# Patient Record
Sex: MALE | Race: OTHER | HISPANIC OR LATINO | Employment: OTHER | URBAN - METROPOLITAN AREA
[De-identification: names, ages, dates, MRNs, and addresses within clinical notes are randomized per-mention and may not be internally consistent; named-entity substitution may affect disease eponyms.]

---

## 2018-06-08 ENCOUNTER — APPOINTMENT (OUTPATIENT)
Dept: LAB | Facility: HOSPITAL | Age: 68
End: 2018-06-08
Attending: INTERNAL MEDICINE
Payer: MEDICARE

## 2018-06-08 ENCOUNTER — TRANSCRIBE ORDERS (OUTPATIENT)
Dept: ADMINISTRATIVE | Facility: HOSPITAL | Age: 68
End: 2018-06-08

## 2018-06-08 DIAGNOSIS — E03.9 MYXEDEMA HEART DISEASE: ICD-10-CM

## 2018-06-08 DIAGNOSIS — E78.2 MIXED HYPERLIPIDEMIA: ICD-10-CM

## 2018-06-08 DIAGNOSIS — Z09 RADIOTHERAPY FOLLOW-UP EXAMINATION: ICD-10-CM

## 2018-06-08 DIAGNOSIS — I51.9 MYXEDEMA HEART DISEASE: ICD-10-CM

## 2018-06-08 DIAGNOSIS — R53.83 FATIGUE DUE TO DEPRESSION: ICD-10-CM

## 2018-06-08 DIAGNOSIS — R73.9 BLOOD GLUCOSE ELEVATED: Primary | ICD-10-CM

## 2018-06-08 DIAGNOSIS — M25.50 PAIN IN JOINT, MULTIPLE SITES: ICD-10-CM

## 2018-06-08 DIAGNOSIS — I10 ESSENTIAL HYPERTENSION, MALIGNANT: ICD-10-CM

## 2018-06-08 DIAGNOSIS — R94.5 NONSPECIFIC ABNORMAL RESULTS OF LIVER FUNCTION STUDY: ICD-10-CM

## 2018-06-08 DIAGNOSIS — D50.0 IRON DEFICIENCY ANEMIA DUE TO CHRONIC BLOOD LOSS: ICD-10-CM

## 2018-06-08 DIAGNOSIS — F32.A FATIGUE DUE TO DEPRESSION: ICD-10-CM

## 2018-06-08 DIAGNOSIS — N18.1 STAGE 1 CHRONIC KIDNEY DISEASE: ICD-10-CM

## 2018-06-08 DIAGNOSIS — M79.10 MYALGIA: ICD-10-CM

## 2018-06-08 DIAGNOSIS — R10.9 STOMACH ACHE: ICD-10-CM

## 2018-06-08 DIAGNOSIS — R73.9 BLOOD GLUCOSE ELEVATED: ICD-10-CM

## 2018-06-08 LAB
ALBUMIN SERPL BCP-MCNC: 3.8 G/DL (ref 3.5–5)
ALP SERPL-CCNC: 56 U/L (ref 46–116)
ALT SERPL W P-5'-P-CCNC: 45 U/L (ref 12–78)
ANION GAP SERPL CALCULATED.3IONS-SCNC: 11 MMOL/L (ref 4–13)
AST SERPL W P-5'-P-CCNC: 25 U/L (ref 5–45)
BACTERIA UR QL AUTO: ABNORMAL /HPF
BASOPHILS # BLD AUTO: 0.03 THOUSANDS/ΜL (ref 0–0.1)
BASOPHILS NFR BLD AUTO: 0 % (ref 0–1)
BILIRUB SERPL-MCNC: 1.3 MG/DL (ref 0.2–1)
BILIRUB UR QL STRIP: ABNORMAL
BUN SERPL-MCNC: 17 MG/DL (ref 5–25)
CALCIUM SERPL-MCNC: 9.1 MG/DL (ref 8.3–10.1)
CHLORIDE SERPL-SCNC: 105 MMOL/L (ref 100–108)
CHOLEST SERPL-MCNC: 241 MG/DL (ref 50–200)
CLARITY UR: CLEAR
CO2 SERPL-SCNC: 25 MMOL/L (ref 21–32)
COLOR UR: ABNORMAL
CREAT SERPL-MCNC: 0.92 MG/DL (ref 0.6–1.3)
EOSINOPHIL # BLD AUTO: 0.04 THOUSAND/ΜL (ref 0–0.61)
EOSINOPHIL NFR BLD AUTO: 0 % (ref 0–6)
ERYTHROCYTE [DISTWIDTH] IN BLOOD BY AUTOMATED COUNT: 13 % (ref 11.6–15.1)
EST. AVERAGE GLUCOSE BLD GHB EST-MCNC: 126 MG/DL
GFR SERPL CREATININE-BSD FRML MDRD: 85 ML/MIN/1.73SQ M
GLUCOSE P FAST SERPL-MCNC: 103 MG/DL (ref 65–99)
GLUCOSE UR STRIP-MCNC: NEGATIVE MG/DL
HBA1C MFR BLD: 6 % (ref 4.2–6.3)
HCT VFR BLD AUTO: 49.1 % (ref 36.5–49.3)
HDLC SERPL-MCNC: 103 MG/DL (ref 40–60)
HGB BLD-MCNC: 16.4 G/DL (ref 12–17)
HGB UR QL STRIP.AUTO: ABNORMAL
IMM GRANULOCYTES # BLD AUTO: 0.04 THOUSAND/UL (ref 0–0.2)
IMM GRANULOCYTES NFR BLD AUTO: 0 % (ref 0–2)
KETONES UR STRIP-MCNC: NEGATIVE MG/DL
LDLC SERPL CALC-MCNC: 118 MG/DL (ref 0–100)
LEUKOCYTE ESTERASE UR QL STRIP: NEGATIVE
LYMPHOCYTES # BLD AUTO: 1.69 THOUSANDS/ΜL (ref 0.6–4.47)
LYMPHOCYTES NFR BLD AUTO: 17 % (ref 14–44)
MCH RBC QN AUTO: 32.2 PG (ref 26.8–34.3)
MCHC RBC AUTO-ENTMCNC: 33.4 G/DL (ref 31.4–37.4)
MCV RBC AUTO: 96 FL (ref 82–98)
MONOCYTES # BLD AUTO: 0.66 THOUSAND/ΜL (ref 0.17–1.22)
MONOCYTES NFR BLD AUTO: 7 % (ref 4–12)
MUCOUS THREADS UR QL AUTO: ABNORMAL
NEUTROPHILS # BLD AUTO: 7.48 THOUSANDS/ΜL (ref 1.85–7.62)
NEUTS SEG NFR BLD AUTO: 76 % (ref 43–75)
NITRITE UR QL STRIP: NEGATIVE
NON-SQ EPI CELLS URNS QL MICRO: ABNORMAL /HPF
NONHDLC SERPL-MCNC: 138 MG/DL
NRBC BLD AUTO-RTO: 0 /100 WBCS
PH UR STRIP.AUTO: 5.5 [PH] (ref 5–9)
PLATELET # BLD AUTO: 243 THOUSANDS/UL (ref 149–390)
PMV BLD AUTO: 8.8 FL (ref 8.9–12.7)
POTASSIUM SERPL-SCNC: 3.2 MMOL/L (ref 3.5–5.3)
PROT SERPL-MCNC: 7.2 G/DL (ref 6.4–8.2)
PROT UR STRIP-MCNC: ABNORMAL MG/DL
RBC # BLD AUTO: 5.1 MILLION/UL (ref 3.88–5.62)
RBC #/AREA URNS AUTO: ABNORMAL /HPF
SODIUM SERPL-SCNC: 141 MMOL/L (ref 136–145)
SP GR UR STRIP.AUTO: >=1.03 (ref 1–1.03)
TRIGL SERPL-MCNC: 99 MG/DL
TSH SERPL DL<=0.05 MIU/L-ACNC: 0.32 UIU/ML (ref 0.36–3.74)
UROBILINOGEN UR QL STRIP.AUTO: 0.2 E.U./DL
WBC # BLD AUTO: 9.94 THOUSAND/UL (ref 4.31–10.16)
WBC #/AREA URNS AUTO: ABNORMAL /HPF

## 2018-06-08 PROCEDURE — 84443 ASSAY THYROID STIM HORMONE: CPT | Performed by: INTERNAL MEDICINE

## 2018-06-08 PROCEDURE — 81001 URINALYSIS AUTO W/SCOPE: CPT | Performed by: INTERNAL MEDICINE

## 2018-06-08 PROCEDURE — 36415 COLL VENOUS BLD VENIPUNCTURE: CPT | Performed by: INTERNAL MEDICINE

## 2018-06-08 PROCEDURE — 85025 COMPLETE CBC W/AUTO DIFF WBC: CPT

## 2018-06-08 PROCEDURE — 80061 LIPID PANEL: CPT | Performed by: INTERNAL MEDICINE

## 2018-06-08 PROCEDURE — 83036 HEMOGLOBIN GLYCOSYLATED A1C: CPT | Performed by: INTERNAL MEDICINE

## 2018-06-08 PROCEDURE — 80053 COMPREHEN METABOLIC PANEL: CPT | Performed by: INTERNAL MEDICINE

## 2019-01-03 ENCOUNTER — APPOINTMENT (OUTPATIENT)
Dept: LAB | Facility: HOSPITAL | Age: 69
End: 2019-01-03
Attending: INTERNAL MEDICINE
Payer: MEDICARE

## 2019-01-03 ENCOUNTER — TRANSCRIBE ORDERS (OUTPATIENT)
Dept: ADMINISTRATIVE | Facility: HOSPITAL | Age: 69
End: 2019-01-03

## 2019-01-03 DIAGNOSIS — E03.9 MYXEDEMA HEART DISEASE: ICD-10-CM

## 2019-01-03 DIAGNOSIS — R51.9 FACIAL PAIN: ICD-10-CM

## 2019-01-03 DIAGNOSIS — R94.5 NONSPECIFIC ABNORMAL RESULTS OF LIVER FUNCTION STUDY: ICD-10-CM

## 2019-01-03 DIAGNOSIS — R73.9 BLOOD GLUCOSE ELEVATED: ICD-10-CM

## 2019-01-03 DIAGNOSIS — M79.10 MYALGIA: ICD-10-CM

## 2019-01-03 DIAGNOSIS — I10 ESSENTIAL HYPERTENSION, MALIGNANT: ICD-10-CM

## 2019-01-03 DIAGNOSIS — N18.9 CHRONIC KIDNEY DISEASE, UNSPECIFIED CKD STAGE: ICD-10-CM

## 2019-01-03 DIAGNOSIS — E78.2 MIXED HYPERLIPIDEMIA: ICD-10-CM

## 2019-01-03 DIAGNOSIS — I51.9 MYXEDEMA HEART DISEASE: ICD-10-CM

## 2019-01-03 DIAGNOSIS — R73.9 BLOOD GLUCOSE ELEVATED: Primary | ICD-10-CM

## 2019-01-03 LAB
ALBUMIN SERPL BCP-MCNC: 3.6 G/DL (ref 3.5–5)
ALP SERPL-CCNC: 67 U/L (ref 46–116)
ALT SERPL W P-5'-P-CCNC: 31 U/L (ref 12–78)
ANION GAP SERPL CALCULATED.3IONS-SCNC: 8 MMOL/L (ref 4–13)
AST SERPL W P-5'-P-CCNC: 16 U/L (ref 5–45)
BACTERIA UR QL AUTO: ABNORMAL /HPF
BASOPHILS # BLD AUTO: 0.07 THOUSANDS/ΜL (ref 0–0.1)
BASOPHILS NFR BLD AUTO: 1 % (ref 0–1)
BILIRUB SERPL-MCNC: 0.8 MG/DL (ref 0.2–1)
BILIRUB UR QL STRIP: NEGATIVE
BUN SERPL-MCNC: 19 MG/DL (ref 5–25)
CALCIUM SERPL-MCNC: 9.2 MG/DL (ref 8.3–10.1)
CHLORIDE SERPL-SCNC: 106 MMOL/L (ref 100–108)
CHOLEST SERPL-MCNC: 219 MG/DL (ref 50–200)
CLARITY UR: CLEAR
CO2 SERPL-SCNC: 28 MMOL/L (ref 21–32)
COLOR UR: YELLOW
CREAT SERPL-MCNC: 1.08 MG/DL (ref 0.6–1.3)
EOSINOPHIL # BLD AUTO: 0.14 THOUSAND/ΜL (ref 0–0.61)
EOSINOPHIL NFR BLD AUTO: 1 % (ref 0–6)
ERYTHROCYTE [DISTWIDTH] IN BLOOD BY AUTOMATED COUNT: 13.2 % (ref 11.6–15.1)
EST. AVERAGE GLUCOSE BLD GHB EST-MCNC: 126 MG/DL
GFR SERPL CREATININE-BSD FRML MDRD: 70 ML/MIN/1.73SQ M
GLUCOSE P FAST SERPL-MCNC: 99 MG/DL (ref 65–99)
GLUCOSE UR STRIP-MCNC: NEGATIVE MG/DL
HBA1C MFR BLD: 6 % (ref 4.2–6.3)
HCT VFR BLD AUTO: 52.5 % (ref 36.5–49.3)
HDLC SERPL-MCNC: 93 MG/DL (ref 40–60)
HGB BLD-MCNC: 17.1 G/DL (ref 12–17)
HGB UR QL STRIP.AUTO: ABNORMAL
IMM GRANULOCYTES # BLD AUTO: 0.09 THOUSAND/UL (ref 0–0.2)
IMM GRANULOCYTES NFR BLD AUTO: 1 % (ref 0–2)
KETONES UR STRIP-MCNC: NEGATIVE MG/DL
LDLC SERPL CALC-MCNC: 104 MG/DL (ref 0–100)
LEUKOCYTE ESTERASE UR QL STRIP: NEGATIVE
LYMPHOCYTES # BLD AUTO: 2.53 THOUSANDS/ΜL (ref 0.6–4.47)
LYMPHOCYTES NFR BLD AUTO: 22 % (ref 14–44)
MCH RBC QN AUTO: 32.4 PG (ref 26.8–34.3)
MCHC RBC AUTO-ENTMCNC: 32.6 G/DL (ref 31.4–37.4)
MCV RBC AUTO: 99 FL (ref 82–98)
MONOCYTES # BLD AUTO: 0.78 THOUSAND/ΜL (ref 0.17–1.22)
MONOCYTES NFR BLD AUTO: 7 % (ref 4–12)
MUCOUS THREADS UR QL AUTO: ABNORMAL
NEUTROPHILS # BLD AUTO: 7.74 THOUSANDS/ΜL (ref 1.85–7.62)
NEUTS SEG NFR BLD AUTO: 68 % (ref 43–75)
NITRITE UR QL STRIP: NEGATIVE
NON-SQ EPI CELLS URNS QL MICRO: ABNORMAL /HPF
NONHDLC SERPL-MCNC: 126 MG/DL
NRBC BLD AUTO-RTO: 0 /100 WBCS
PH UR STRIP.AUTO: 6 [PH] (ref 5–9)
PLATELET # BLD AUTO: 240 THOUSANDS/UL (ref 149–390)
PMV BLD AUTO: 8.7 FL (ref 8.9–12.7)
POTASSIUM SERPL-SCNC: 3.6 MMOL/L (ref 3.5–5.3)
PROT SERPL-MCNC: 7.1 G/DL (ref 6.4–8.2)
PROT UR STRIP-MCNC: ABNORMAL MG/DL
RBC # BLD AUTO: 5.28 MILLION/UL (ref 3.88–5.62)
RBC #/AREA URNS AUTO: ABNORMAL /HPF
SODIUM SERPL-SCNC: 142 MMOL/L (ref 136–145)
SP GR UR STRIP.AUTO: >=1.03 (ref 1–1.03)
TRIGL SERPL-MCNC: 111 MG/DL
TSH SERPL DL<=0.05 MIU/L-ACNC: 0.29 UIU/ML (ref 0.36–3.74)
UROBILINOGEN UR QL STRIP.AUTO: 0.2 E.U./DL
WBC # BLD AUTO: 11.35 THOUSAND/UL (ref 4.31–10.16)
WBC #/AREA URNS AUTO: ABNORMAL /HPF

## 2019-01-03 PROCEDURE — 83036 HEMOGLOBIN GLYCOSYLATED A1C: CPT

## 2019-01-03 PROCEDURE — 36415 COLL VENOUS BLD VENIPUNCTURE: CPT

## 2019-01-03 PROCEDURE — 81001 URINALYSIS AUTO W/SCOPE: CPT | Performed by: INTERNAL MEDICINE

## 2019-01-03 PROCEDURE — 85025 COMPLETE CBC W/AUTO DIFF WBC: CPT

## 2019-01-03 PROCEDURE — 80053 COMPREHEN METABOLIC PANEL: CPT

## 2019-01-03 PROCEDURE — 80061 LIPID PANEL: CPT

## 2019-01-03 PROCEDURE — 84443 ASSAY THYROID STIM HORMONE: CPT

## 2019-05-22 ENCOUNTER — TRANSCRIBE ORDERS (OUTPATIENT)
Dept: ADMINISTRATIVE | Facility: HOSPITAL | Age: 69
End: 2019-05-22

## 2019-05-22 ENCOUNTER — APPOINTMENT (OUTPATIENT)
Dept: LAB | Facility: HOSPITAL | Age: 69
End: 2019-05-22
Attending: INTERNAL MEDICINE
Payer: MEDICARE

## 2019-05-22 DIAGNOSIS — N18.9 CHRONIC KIDNEY DISEASE, UNSPECIFIED CKD STAGE: ICD-10-CM

## 2019-05-22 DIAGNOSIS — I51.9 MYXEDEMA HEART DISEASE: ICD-10-CM

## 2019-05-22 DIAGNOSIS — T73.3XXS FATIGUE DUE TO EXCESSIVE EXERTION, SEQUELA: ICD-10-CM

## 2019-05-22 DIAGNOSIS — Z79.01 LONG TERM (CURRENT) USE OF ANTICOAGULANTS: ICD-10-CM

## 2019-05-22 DIAGNOSIS — I10 ESSENTIAL HYPERTENSION, MALIGNANT: ICD-10-CM

## 2019-05-22 DIAGNOSIS — R94.5 NONSPECIFIC ABNORMAL RESULTS OF LIVER FUNCTION STUDY: ICD-10-CM

## 2019-05-22 DIAGNOSIS — R50.9 HYPERTHERMIA-INDUCED DEFECT: ICD-10-CM

## 2019-05-22 DIAGNOSIS — M79.10 MYALGIA: ICD-10-CM

## 2019-05-22 DIAGNOSIS — E78.2 MIXED HYPERLIPIDEMIA: ICD-10-CM

## 2019-05-22 DIAGNOSIS — D51.8 OTHER VITAMIN B12 DEFICIENCY ANEMIA: ICD-10-CM

## 2019-05-22 DIAGNOSIS — E03.9 MYXEDEMA HEART DISEASE: ICD-10-CM

## 2019-05-22 DIAGNOSIS — R10.9 STOMACH ACHE: ICD-10-CM

## 2019-05-22 DIAGNOSIS — R73.9 BLOOD GLUCOSE ELEVATED: ICD-10-CM

## 2019-05-22 DIAGNOSIS — R73.9 BLOOD GLUCOSE ELEVATED: Primary | ICD-10-CM

## 2019-05-22 LAB
25(OH)D3 SERPL-MCNC: 11.9 NG/ML (ref 30–100)
ALBUMIN SERPL BCP-MCNC: 3.3 G/DL (ref 3.5–5)
ALP SERPL-CCNC: 61 U/L (ref 46–116)
ALT SERPL W P-5'-P-CCNC: 32 U/L (ref 12–78)
ANION GAP SERPL CALCULATED.3IONS-SCNC: 8 MMOL/L (ref 4–13)
AST SERPL W P-5'-P-CCNC: 17 U/L (ref 5–45)
BACTERIA UR QL AUTO: ABNORMAL /HPF
BASOPHILS # BLD AUTO: 0.02 THOUSANDS/ΜL (ref 0–0.1)
BASOPHILS NFR BLD AUTO: 0 % (ref 0–1)
BILIRUB SERPL-MCNC: 0.8 MG/DL (ref 0.2–1)
BILIRUB UR QL STRIP: NEGATIVE
BUN SERPL-MCNC: 21 MG/DL (ref 5–25)
CALCIUM SERPL-MCNC: 8.6 MG/DL (ref 8.3–10.1)
CHLORIDE SERPL-SCNC: 105 MMOL/L (ref 100–108)
CHOLEST SERPL-MCNC: 214 MG/DL (ref 50–200)
CLARITY UR: CLEAR
CO2 SERPL-SCNC: 27 MMOL/L (ref 21–32)
COLOR UR: YELLOW
CREAT SERPL-MCNC: 0.99 MG/DL (ref 0.6–1.3)
EOSINOPHIL # BLD AUTO: 0.1 THOUSAND/ΜL (ref 0–0.61)
EOSINOPHIL NFR BLD AUTO: 1 % (ref 0–6)
ERYTHROCYTE [DISTWIDTH] IN BLOOD BY AUTOMATED COUNT: 13.2 % (ref 11.6–15.1)
EST. AVERAGE GLUCOSE BLD GHB EST-MCNC: 123 MG/DL
GFR SERPL CREATININE-BSD FRML MDRD: 78 ML/MIN/1.73SQ M
GLUCOSE P FAST SERPL-MCNC: 96 MG/DL (ref 65–99)
GLUCOSE UR STRIP-MCNC: NEGATIVE MG/DL
HBA1C MFR BLD: 5.9 % (ref 4.2–6.3)
HCT VFR BLD AUTO: 50.2 % (ref 36.5–49.3)
HDLC SERPL-MCNC: 81 MG/DL (ref 40–60)
HGB BLD-MCNC: 16.4 G/DL (ref 12–17)
HGB UR QL STRIP.AUTO: ABNORMAL
IMM GRANULOCYTES # BLD AUTO: 0.07 THOUSAND/UL (ref 0–0.2)
IMM GRANULOCYTES NFR BLD AUTO: 1 % (ref 0–2)
KETONES UR STRIP-MCNC: NEGATIVE MG/DL
LDLC SERPL CALC-MCNC: 109 MG/DL (ref 0–100)
LEUKOCYTE ESTERASE UR QL STRIP: NEGATIVE
LYMPHOCYTES # BLD AUTO: 2.57 THOUSANDS/ΜL (ref 0.6–4.47)
LYMPHOCYTES NFR BLD AUTO: 23 % (ref 14–44)
MCH RBC QN AUTO: 32.1 PG (ref 26.8–34.3)
MCHC RBC AUTO-ENTMCNC: 32.7 G/DL (ref 31.4–37.4)
MCV RBC AUTO: 98 FL (ref 82–98)
MONOCYTES # BLD AUTO: 0.81 THOUSAND/ΜL (ref 0.17–1.22)
MONOCYTES NFR BLD AUTO: 7 % (ref 4–12)
MUCOUS THREADS UR QL AUTO: ABNORMAL
NEUTROPHILS # BLD AUTO: 7.74 THOUSANDS/ΜL (ref 1.85–7.62)
NEUTS SEG NFR BLD AUTO: 68 % (ref 43–75)
NITRITE UR QL STRIP: NEGATIVE
NON-SQ EPI CELLS URNS QL MICRO: ABNORMAL /HPF
NONHDLC SERPL-MCNC: 133 MG/DL
NRBC BLD AUTO-RTO: 0 /100 WBCS
PH UR STRIP.AUTO: 6 [PH]
PLATELET # BLD AUTO: 234 THOUSANDS/UL (ref 149–390)
PMV BLD AUTO: 9 FL (ref 8.9–12.7)
POTASSIUM SERPL-SCNC: 3.3 MMOL/L (ref 3.5–5.3)
PROT SERPL-MCNC: 7 G/DL (ref 6.4–8.2)
PROT UR STRIP-MCNC: ABNORMAL MG/DL
PSA SERPL-MCNC: 1.2 NG/ML (ref 0–4)
RBC # BLD AUTO: 5.11 MILLION/UL (ref 3.88–5.62)
RBC #/AREA URNS AUTO: ABNORMAL /HPF
SODIUM SERPL-SCNC: 140 MMOL/L (ref 136–145)
SP GR UR STRIP.AUTO: 1.02 (ref 1–1.03)
TRIGL SERPL-MCNC: 122 MG/DL
TSH SERPL DL<=0.05 MIU/L-ACNC: 0.36 UIU/ML (ref 0.36–3.74)
UROBILINOGEN UR QL STRIP.AUTO: 0.2 E.U./DL
WBC # BLD AUTO: 11.31 THOUSAND/UL (ref 4.31–10.16)
WBC #/AREA URNS AUTO: ABNORMAL /HPF

## 2019-05-22 PROCEDURE — 83036 HEMOGLOBIN GLYCOSYLATED A1C: CPT | Performed by: INTERNAL MEDICINE

## 2019-05-22 PROCEDURE — 85025 COMPLETE CBC W/AUTO DIFF WBC: CPT | Performed by: INTERNAL MEDICINE

## 2019-05-22 PROCEDURE — 82306 VITAMIN D 25 HYDROXY: CPT

## 2019-05-22 PROCEDURE — 80053 COMPREHEN METABOLIC PANEL: CPT | Performed by: INTERNAL MEDICINE

## 2019-05-22 PROCEDURE — 84443 ASSAY THYROID STIM HORMONE: CPT

## 2019-05-22 PROCEDURE — G0103 PSA SCREENING: HCPCS

## 2019-05-22 PROCEDURE — 81001 URINALYSIS AUTO W/SCOPE: CPT | Performed by: INTERNAL MEDICINE

## 2019-05-22 PROCEDURE — 36415 COLL VENOUS BLD VENIPUNCTURE: CPT | Performed by: INTERNAL MEDICINE

## 2019-05-22 PROCEDURE — 80061 LIPID PANEL: CPT

## 2019-12-01 ENCOUNTER — OFFICE VISIT (OUTPATIENT)
Dept: URGENT CARE | Facility: CLINIC | Age: 69
End: 2019-12-01
Payer: MEDICARE

## 2019-12-01 VITALS
DIASTOLIC BLOOD PRESSURE: 84 MMHG | BODY MASS INDEX: 29.88 KG/M2 | TEMPERATURE: 98.6 F | RESPIRATION RATE: 18 BRPM | HEART RATE: 88 BPM | HEIGHT: 64 IN | SYSTOLIC BLOOD PRESSURE: 136 MMHG | OXYGEN SATURATION: 98 % | WEIGHT: 175 LBS

## 2019-12-01 DIAGNOSIS — J06.9 UPPER RESPIRATORY TRACT INFECTION, UNSPECIFIED TYPE: Primary | ICD-10-CM

## 2019-12-01 PROCEDURE — 99213 OFFICE O/P EST LOW 20 MIN: CPT | Performed by: PHYSICIAN ASSISTANT

## 2019-12-01 RX ORDER — LORATADINE 10 MG/1
10 TABLET ORAL DAILY
Refills: 0 | Status: ON HOLD | COMMUNITY
Start: 2019-11-08 | End: 2021-07-17 | Stop reason: ALTCHOICE

## 2019-12-01 RX ORDER — AZITHROMYCIN 250 MG/1
TABLET, FILM COATED ORAL
Qty: 6 TABLET | Refills: 0 | Status: SHIPPED | OUTPATIENT
Start: 2019-12-01 | End: 2019-12-05

## 2019-12-01 RX ORDER — ERGOCALCIFEROL 1.25 MG/1
50000 CAPSULE ORAL WEEKLY
Refills: 0 | Status: ON HOLD | COMMUNITY
Start: 2019-10-26 | End: 2021-07-17 | Stop reason: ALTCHOICE

## 2019-12-01 NOTE — PROGRESS NOTES
[facility]    NAME: Marla Evans is a 71 y o  male  : 1950    MRN: [id number]  DATE: 2019  TIME: 10:35 PM    Assessment and Plan   Upper respiratory tract infection, unspecified type [J06 9]  1  Upper respiratory tract infection, unspecified type  azithromycin (ZITHROMAX) 250 mg tablet       Patient Instructions   rx z-corine sent via EMR  Recommend mucinex DM for cough  Also humidifier and breathing steam  Rest, fluids and supportive care  Follow up with PCP in 3-5 days  Proceed to  ER if symptoms worsen  Chief Complaint     Chief Complaint   Patient presents with    Cough     Been sick for the last 8 days- his cough has been keeping him awake at night    Headache    Sore Throat         History of Present Illness       Dustin Yost is a 80-year-old male who presents to clinic complaining of cough x8 days  He states that the cough is productive with yellow white sputum  He states the cough is worse at night when he lays down  He is also having a mild sore throat  He denies any fever, chills, sinus pain or pressure, shortness of breath, chest pain, or ear pain  He is having headaches as well  And notes that mom multiple grand kids of his or sick and he was around them during the holiday  Review of Systems   Review of Systems   Constitutional: Positive for fatigue  Negative for chills and fever  HENT: Positive for congestion and sore throat  Negative for ear pain, sinus pressure and sinus pain  Respiratory: Positive for cough  Negative for shortness of breath  Musculoskeletal: Negative for myalgias  Neurological: Positive for headaches       Current Medications       Current Outpatient Medications:     azithromycin (ZITHROMAX) 250 mg tablet, Take 2 tablets today then 1 tablet daily x 4 days, Disp: 6 tablet, Rfl: 0    ergocalciferol (VITAMIN D2) 50,000 units, Take 50,000 Units by mouth once a week, Disp: , Rfl: 0    lisinopril (ZESTRIL) 20 mg tablet, Take 20 mg by mouth daily in the early morning , Disp: , Rfl:     loratadine (CLARITIN) 10 mg tablet, Take 10 mg by mouth daily, Disp: , Rfl: 0    tamsulosin (FLOMAX) 0 4 mg, Take 0 4 mg by mouth daily at bedtime  , Disp: , Rfl:     Current Allergies     Allergies as of 12/01/2019    (No Known Allergies)            The following portions of the patient's history were reviewed and updated as appropriate: allergies, current medications, past family history, past medical history, past social history, past surgical history and problem list      Past Medical History:   Diagnosis Date    BPH (benign prostatic hyperplasia)     Hypertension     Kidney calculi     left       Past Surgical History:   Procedure Laterality Date    EXTRACORPOREAL SHOCK WAVE LITHOTRIPSY      FRACTURE SURGERY Right     ankle with hardware    TRANSURETHRAL RESECTION OF PROSTATE         History reviewed  No pertinent family history  Medications have been verified  Objective   /84 (BP Location: Right arm, Patient Position: Sitting, Cuff Size: Standard)   Pulse 88   Temp 98 6 °F (37 °C) (Temporal)   Resp 18   Ht 5' 4" (1 626 m)   Wt 79 4 kg (175 lb)   SpO2 98%   BMI 30 04 kg/m²        Physical Exam     Physical Exam   Constitutional: He is oriented to person, place, and time  He appears well-developed and well-nourished  No distress  HENT:   Right Ear: Hearing, tympanic membrane, external ear and ear canal normal    Left Ear: Hearing, tympanic membrane, external ear and ear canal normal    Nose: Mucosal edema and rhinorrhea present  Right sinus exhibits no maxillary sinus tenderness and no frontal sinus tenderness  Left sinus exhibits no maxillary sinus tenderness and no frontal sinus tenderness  Mouth/Throat: Uvula is midline and mucous membranes are normal  Posterior oropharyngeal erythema present  Tonsils are 0 on the right  Tonsils are 0 on the left  No tonsillar exudate  Cardiovascular: Normal rate and regular rhythm  Pulmonary/Chest: Effort normal and breath sounds normal  No respiratory distress  He has no wheezes  He has no rhonchi  Lymphadenopathy:     He has no cervical adenopathy  Neurological: He is alert and oriented to person, place, and time  Psychiatric: He has a normal mood and affect  Nursing note and vitals reviewed

## 2019-12-01 NOTE — PATIENT INSTRUCTIONS
rx z-corine sent via EMR  Recommend mucinex DM for cough  Also humidifier and breathing steam  Rest, fluids and supportive care  Follow up with PCP in 3-5 days  Proceed to  ER if symptoms worsen

## 2020-09-11 ENCOUNTER — APPOINTMENT (OUTPATIENT)
Dept: LAB | Facility: HOSPITAL | Age: 70
End: 2020-09-11
Attending: INTERNAL MEDICINE
Payer: MEDICARE

## 2020-09-11 ENCOUNTER — TRANSCRIBE ORDERS (OUTPATIENT)
Dept: ADMINISTRATIVE | Facility: HOSPITAL | Age: 70
End: 2020-09-11

## 2020-09-11 DIAGNOSIS — R51.9 NONINTRACTABLE HEADACHE, UNSPECIFIED CHRONICITY PATTERN, UNSPECIFIED HEADACHE TYPE: ICD-10-CM

## 2020-09-11 DIAGNOSIS — M25.50 ARTHRALGIA, UNSPECIFIED JOINT: ICD-10-CM

## 2020-09-11 DIAGNOSIS — N13.8 ENLARGED PROSTATE WITH URINARY OBSTRUCTION: ICD-10-CM

## 2020-09-11 DIAGNOSIS — E78.2 MIXED HYPERLIPIDEMIA: ICD-10-CM

## 2020-09-11 DIAGNOSIS — E03.9 HYPOTHYROIDISM, ADULT: ICD-10-CM

## 2020-09-11 DIAGNOSIS — N40.1 ENLARGED PROSTATE WITH URINARY OBSTRUCTION: ICD-10-CM

## 2020-09-11 DIAGNOSIS — Z79.899 HIGH RISK MEDICATION USE: ICD-10-CM

## 2020-09-11 DIAGNOSIS — N18.9 CHRONIC KIDNEY DISEASE, UNSPECIFIED CKD STAGE: ICD-10-CM

## 2020-09-11 DIAGNOSIS — R94.5 ABNORMAL RESULTS OF LIVER FUNCTION STUDIES: ICD-10-CM

## 2020-09-11 DIAGNOSIS — D64.9 ANEMIA, UNSPECIFIED TYPE: ICD-10-CM

## 2020-09-11 DIAGNOSIS — R53.83 TIREDNESS: ICD-10-CM

## 2020-09-11 DIAGNOSIS — R73.9 HYPERGLYCEMIA: ICD-10-CM

## 2020-09-11 DIAGNOSIS — R73.9 HYPERGLYCEMIA: Primary | ICD-10-CM

## 2020-09-11 DIAGNOSIS — I10 ESSENTIAL HYPERTENSION, MALIGNANT: ICD-10-CM

## 2020-09-11 DIAGNOSIS — E05.20 TOXIC MULTINODULAR GOITER: ICD-10-CM

## 2020-09-11 DIAGNOSIS — M79.10 MYALGIA: ICD-10-CM

## 2020-09-11 DIAGNOSIS — R10.9 ABDOMINAL PAIN, UNSPECIFIED ABDOMINAL LOCATION: ICD-10-CM

## 2020-09-11 LAB
ALBUMIN SERPL BCP-MCNC: 3.5 G/DL (ref 3.5–5)
ALP SERPL-CCNC: 64 U/L (ref 46–116)
ALT SERPL W P-5'-P-CCNC: 33 U/L (ref 12–78)
ANION GAP SERPL CALCULATED.3IONS-SCNC: 8 MMOL/L (ref 4–13)
AST SERPL W P-5'-P-CCNC: 20 U/L (ref 5–45)
BACTERIA UR QL AUTO: ABNORMAL /HPF
BASOPHILS # BLD AUTO: 0.04 THOUSANDS/ΜL (ref 0–0.1)
BASOPHILS NFR BLD AUTO: 0 % (ref 0–1)
BILIRUB SERPL-MCNC: 1.2 MG/DL (ref 0.2–1)
BILIRUB UR QL STRIP: NEGATIVE
BUN SERPL-MCNC: 22 MG/DL (ref 5–25)
CALCIUM SERPL-MCNC: 8.7 MG/DL (ref 8.3–10.1)
CHLORIDE SERPL-SCNC: 106 MMOL/L (ref 100–108)
CHOLEST SERPL-MCNC: 234 MG/DL (ref 50–200)
CLARITY UR: CLEAR
CO2 SERPL-SCNC: 28 MMOL/L (ref 21–32)
COLOR UR: ABNORMAL
CREAT SERPL-MCNC: 1.05 MG/DL (ref 0.6–1.3)
EOSINOPHIL # BLD AUTO: 0.07 THOUSAND/ΜL (ref 0–0.61)
EOSINOPHIL NFR BLD AUTO: 1 % (ref 0–6)
ERYTHROCYTE [DISTWIDTH] IN BLOOD BY AUTOMATED COUNT: 13.5 % (ref 11.6–15.1)
EST. AVERAGE GLUCOSE BLD GHB EST-MCNC: 117 MG/DL
GFR SERPL CREATININE-BSD FRML MDRD: 72 ML/MIN/1.73SQ M
GLUCOSE P FAST SERPL-MCNC: 95 MG/DL (ref 65–99)
GLUCOSE UR STRIP-MCNC: NEGATIVE MG/DL
HBA1C MFR BLD: 5.7 %
HCT VFR BLD AUTO: 49.6 % (ref 36.5–49.3)
HDLC SERPL-MCNC: 91 MG/DL
HGB BLD-MCNC: 16.4 G/DL (ref 12–17)
HGB UR QL STRIP.AUTO: ABNORMAL
IMM GRANULOCYTES # BLD AUTO: 0.06 THOUSAND/UL (ref 0–0.2)
IMM GRANULOCYTES NFR BLD AUTO: 1 % (ref 0–2)
KETONES UR STRIP-MCNC: NEGATIVE MG/DL
LDLC SERPL CALC-MCNC: 121 MG/DL (ref 0–100)
LEUKOCYTE ESTERASE UR QL STRIP: NEGATIVE
LYMPHOCYTES # BLD AUTO: 1.94 THOUSANDS/ΜL (ref 0.6–4.47)
LYMPHOCYTES NFR BLD AUTO: 20 % (ref 14–44)
MCH RBC QN AUTO: 33 PG (ref 26.8–34.3)
MCHC RBC AUTO-ENTMCNC: 33.1 G/DL (ref 31.4–37.4)
MCV RBC AUTO: 100 FL (ref 82–98)
MONOCYTES # BLD AUTO: 0.68 THOUSAND/ΜL (ref 0.17–1.22)
MONOCYTES NFR BLD AUTO: 7 % (ref 4–12)
MUCOUS THREADS UR QL AUTO: ABNORMAL
NEUTROPHILS # BLD AUTO: 7.02 THOUSANDS/ΜL (ref 1.85–7.62)
NEUTS SEG NFR BLD AUTO: 71 % (ref 43–75)
NITRITE UR QL STRIP: NEGATIVE
NON-SQ EPI CELLS URNS QL MICRO: ABNORMAL /HPF
NONHDLC SERPL-MCNC: 143 MG/DL
NRBC BLD AUTO-RTO: 0 /100 WBCS
PH UR STRIP.AUTO: 6 [PH]
PLATELET # BLD AUTO: 222 THOUSANDS/UL (ref 149–390)
PMV BLD AUTO: 9.1 FL (ref 8.9–12.7)
POTASSIUM SERPL-SCNC: 3.1 MMOL/L (ref 3.5–5.3)
PROT SERPL-MCNC: 6.9 G/DL (ref 6.4–8.2)
PROT UR STRIP-MCNC: ABNORMAL MG/DL
PSA SERPL-MCNC: 1.4 NG/ML (ref 0–4)
RBC # BLD AUTO: 4.97 MILLION/UL (ref 3.88–5.62)
RBC #/AREA URNS AUTO: ABNORMAL /HPF
SODIUM SERPL-SCNC: 142 MMOL/L (ref 136–145)
SP GR UR STRIP.AUTO: 1.02 (ref 1–1.03)
T3 SERPL-MCNC: 1 NG/ML (ref 0.6–1.8)
T4 FREE SERPL-MCNC: 1.03 NG/DL (ref 0.76–1.46)
TRIGL SERPL-MCNC: 112 MG/DL
TSH SERPL DL<=0.05 MIU/L-ACNC: 0.32 UIU/ML (ref 0.36–3.74)
UROBILINOGEN UR QL STRIP.AUTO: 0.2 E.U./DL
WBC # BLD AUTO: 9.81 THOUSAND/UL (ref 4.31–10.16)
WBC #/AREA URNS AUTO: ABNORMAL /HPF

## 2020-09-11 PROCEDURE — 85025 COMPLETE CBC W/AUTO DIFF WBC: CPT

## 2020-09-11 PROCEDURE — 36415 COLL VENOUS BLD VENIPUNCTURE: CPT

## 2020-09-11 PROCEDURE — 84439 ASSAY OF FREE THYROXINE: CPT

## 2020-09-11 PROCEDURE — 81001 URINALYSIS AUTO W/SCOPE: CPT | Performed by: INTERNAL MEDICINE

## 2020-09-11 PROCEDURE — G0103 PSA SCREENING: HCPCS

## 2020-09-11 PROCEDURE — 84480 ASSAY TRIIODOTHYRONINE (T3): CPT

## 2020-09-11 PROCEDURE — 84443 ASSAY THYROID STIM HORMONE: CPT

## 2020-09-11 PROCEDURE — 80061 LIPID PANEL: CPT

## 2020-09-11 PROCEDURE — 83036 HEMOGLOBIN GLYCOSYLATED A1C: CPT

## 2020-09-11 PROCEDURE — 80053 COMPREHEN METABOLIC PANEL: CPT

## 2021-02-24 ENCOUNTER — LAB (OUTPATIENT)
Dept: LAB | Facility: HOSPITAL | Age: 71
End: 2021-02-24
Attending: INTERNAL MEDICINE
Payer: MEDICARE

## 2021-02-24 ENCOUNTER — TRANSCRIBE ORDERS (OUTPATIENT)
Dept: ADMINISTRATIVE | Facility: HOSPITAL | Age: 71
End: 2021-02-24

## 2021-02-24 DIAGNOSIS — D64.9 ANEMIA, UNSPECIFIED TYPE: ICD-10-CM

## 2021-02-24 DIAGNOSIS — R53.83 FATIGUE, UNSPECIFIED TYPE: ICD-10-CM

## 2021-02-24 DIAGNOSIS — R94.5 NONSPECIFIC ABNORMAL RESULTS OF LIVER FUNCTION STUDY: ICD-10-CM

## 2021-02-24 DIAGNOSIS — E78.2 MIXED HYPERLIPIDEMIA: ICD-10-CM

## 2021-02-24 DIAGNOSIS — I10 ESSENTIAL HYPERTENSION, MALIGNANT: ICD-10-CM

## 2021-02-24 DIAGNOSIS — R73.9 BLOOD GLUCOSE ELEVATED: Primary | ICD-10-CM

## 2021-02-24 DIAGNOSIS — E03.9 HYPOTHYROIDISM, UNSPECIFIED TYPE: ICD-10-CM

## 2021-02-24 DIAGNOSIS — E55.9 VITAMIN D DEFICIENCY: ICD-10-CM

## 2021-02-24 DIAGNOSIS — R73.9 BLOOD GLUCOSE ELEVATED: ICD-10-CM

## 2021-02-24 LAB
25(OH)D3 SERPL-MCNC: 15.1 NG/ML (ref 30–100)
ALBUMIN SERPL BCP-MCNC: 3.6 G/DL (ref 3.5–5)
ALP SERPL-CCNC: 66 U/L (ref 46–116)
ALT SERPL W P-5'-P-CCNC: 30 U/L (ref 12–78)
ANION GAP SERPL CALCULATED.3IONS-SCNC: 8 MMOL/L (ref 4–13)
AST SERPL W P-5'-P-CCNC: 15 U/L (ref 5–45)
BACTERIA UR QL AUTO: ABNORMAL /HPF
BASOPHILS # BLD AUTO: 0.05 THOUSANDS/ΜL (ref 0–0.1)
BASOPHILS NFR BLD AUTO: 1 % (ref 0–1)
BILIRUB SERPL-MCNC: 0.8 MG/DL (ref 0.2–1)
BILIRUB UR QL STRIP: NEGATIVE
BUN SERPL-MCNC: 22 MG/DL (ref 5–25)
CALCIUM SERPL-MCNC: 9.2 MG/DL (ref 8.3–10.1)
CHLORIDE SERPL-SCNC: 105 MMOL/L (ref 100–108)
CHOLEST SERPL-MCNC: 224 MG/DL (ref 50–200)
CLARITY UR: CLEAR
CO2 SERPL-SCNC: 28 MMOL/L (ref 21–32)
COLOR UR: YELLOW
CREAT SERPL-MCNC: 1.02 MG/DL (ref 0.6–1.3)
EOSINOPHIL # BLD AUTO: 0.07 THOUSAND/ΜL (ref 0–0.61)
EOSINOPHIL NFR BLD AUTO: 1 % (ref 0–6)
ERYTHROCYTE [DISTWIDTH] IN BLOOD BY AUTOMATED COUNT: 13.5 % (ref 11.6–15.1)
EST. AVERAGE GLUCOSE BLD GHB EST-MCNC: 117 MG/DL
GFR SERPL CREATININE-BSD FRML MDRD: 74 ML/MIN/1.73SQ M
GLUCOSE P FAST SERPL-MCNC: 101 MG/DL (ref 65–99)
GLUCOSE UR STRIP-MCNC: NEGATIVE MG/DL
HBA1C MFR BLD: 5.7 %
HCT VFR BLD AUTO: 51.2 % (ref 36.5–49.3)
HDLC SERPL-MCNC: 90 MG/DL
HGB BLD-MCNC: 16.2 G/DL (ref 12–17)
HGB UR QL STRIP.AUTO: ABNORMAL
IMM GRANULOCYTES # BLD AUTO: 0.06 THOUSAND/UL (ref 0–0.2)
IMM GRANULOCYTES NFR BLD AUTO: 1 % (ref 0–2)
KETONES UR STRIP-MCNC: NEGATIVE MG/DL
LDLC SERPL CALC-MCNC: 110 MG/DL (ref 0–100)
LEUKOCYTE ESTERASE UR QL STRIP: NEGATIVE
LYMPHOCYTES # BLD AUTO: 1.68 THOUSANDS/ΜL (ref 0.6–4.47)
LYMPHOCYTES NFR BLD AUTO: 21 % (ref 14–44)
MCH RBC QN AUTO: 31.3 PG (ref 26.8–34.3)
MCHC RBC AUTO-ENTMCNC: 31.6 G/DL (ref 31.4–37.4)
MCV RBC AUTO: 99 FL (ref 82–98)
MONOCYTES # BLD AUTO: 0.6 THOUSAND/ΜL (ref 0.17–1.22)
MONOCYTES NFR BLD AUTO: 7 % (ref 4–12)
MUCOUS THREADS UR QL AUTO: ABNORMAL
NEUTROPHILS # BLD AUTO: 5.74 THOUSANDS/ΜL (ref 1.85–7.62)
NEUTS SEG NFR BLD AUTO: 69 % (ref 43–75)
NITRITE UR QL STRIP: NEGATIVE
NON-SQ EPI CELLS URNS QL MICRO: ABNORMAL /HPF
NONHDLC SERPL-MCNC: 134 MG/DL
NRBC BLD AUTO-RTO: 0 /100 WBCS
PH UR STRIP.AUTO: 7 [PH]
PLATELET # BLD AUTO: 226 THOUSANDS/UL (ref 149–390)
PMV BLD AUTO: 9.1 FL (ref 8.9–12.7)
POTASSIUM SERPL-SCNC: 3.7 MMOL/L (ref 3.5–5.3)
PROT SERPL-MCNC: 7 G/DL (ref 6.4–8.2)
PROT UR STRIP-MCNC: NEGATIVE MG/DL
RBC # BLD AUTO: 5.18 MILLION/UL (ref 3.88–5.62)
RBC #/AREA URNS AUTO: ABNORMAL /HPF
SODIUM SERPL-SCNC: 141 MMOL/L (ref 136–145)
SP GR UR STRIP.AUTO: 1.02 (ref 1–1.03)
TRIGL SERPL-MCNC: 118 MG/DL
TSH SERPL DL<=0.05 MIU/L-ACNC: 0.3 UIU/ML (ref 0.36–3.74)
UROBILINOGEN UR QL STRIP.AUTO: 0.2 E.U./DL
WBC # BLD AUTO: 8.2 THOUSAND/UL (ref 4.31–10.16)
WBC #/AREA URNS AUTO: ABNORMAL /HPF

## 2021-02-24 PROCEDURE — 83036 HEMOGLOBIN GLYCOSYLATED A1C: CPT | Performed by: INTERNAL MEDICINE

## 2021-02-24 PROCEDURE — 84443 ASSAY THYROID STIM HORMONE: CPT

## 2021-02-24 PROCEDURE — 36415 COLL VENOUS BLD VENIPUNCTURE: CPT | Performed by: INTERNAL MEDICINE

## 2021-02-24 PROCEDURE — 81001 URINALYSIS AUTO W/SCOPE: CPT | Performed by: INTERNAL MEDICINE

## 2021-02-24 PROCEDURE — 80053 COMPREHEN METABOLIC PANEL: CPT | Performed by: INTERNAL MEDICINE

## 2021-02-24 PROCEDURE — 82306 VITAMIN D 25 HYDROXY: CPT

## 2021-02-24 PROCEDURE — 80061 LIPID PANEL: CPT | Performed by: INTERNAL MEDICINE

## 2021-02-24 PROCEDURE — 85025 COMPLETE CBC W/AUTO DIFF WBC: CPT | Performed by: INTERNAL MEDICINE

## 2021-03-01 DIAGNOSIS — Z23 ENCOUNTER FOR IMMUNIZATION: ICD-10-CM

## 2021-03-04 ENCOUNTER — IMMUNIZATIONS (OUTPATIENT)
Dept: FAMILY MEDICINE CLINIC | Facility: HOSPITAL | Age: 71
End: 2021-03-04

## 2021-03-04 DIAGNOSIS — Z23 ENCOUNTER FOR IMMUNIZATION: Primary | ICD-10-CM

## 2021-03-04 PROCEDURE — 91300 SARS-COV-2 / COVID-19 MRNA VACCINE (PFIZER-BIONTECH) 30 MCG: CPT

## 2021-03-04 PROCEDURE — 0001A SARS-COV-2 / COVID-19 MRNA VACCINE (PFIZER-BIONTECH) 30 MCG: CPT

## 2021-03-25 ENCOUNTER — IMMUNIZATIONS (OUTPATIENT)
Dept: FAMILY MEDICINE CLINIC | Facility: HOSPITAL | Age: 71
End: 2021-03-25

## 2021-03-25 DIAGNOSIS — Z23 ENCOUNTER FOR IMMUNIZATION: Primary | ICD-10-CM

## 2021-03-25 PROCEDURE — 0002A SARS-COV-2 / COVID-19 MRNA VACCINE (PFIZER-BIONTECH) 30 MCG: CPT

## 2021-03-25 PROCEDURE — 91300 SARS-COV-2 / COVID-19 MRNA VACCINE (PFIZER-BIONTECH) 30 MCG: CPT

## 2021-07-17 ENCOUNTER — APPOINTMENT (EMERGENCY)
Dept: RADIOLOGY | Facility: HOSPITAL | Age: 71
End: 2021-07-17
Payer: MEDICARE

## 2021-07-17 ENCOUNTER — HOSPITAL ENCOUNTER (OUTPATIENT)
Facility: HOSPITAL | Age: 71
Setting detail: OUTPATIENT SURGERY
Discharge: HOME | End: 2021-07-18
Attending: EMERGENCY MEDICINE | Admitting: INTERNAL MEDICINE
Payer: MEDICARE

## 2021-07-17 DIAGNOSIS — N39.0 UTI (URINARY TRACT INFECTION): ICD-10-CM

## 2021-07-17 DIAGNOSIS — I10 ESSENTIAL HYPERTENSION: ICD-10-CM

## 2021-07-17 DIAGNOSIS — M25.552 LEFT HIP PAIN: ICD-10-CM

## 2021-07-17 DIAGNOSIS — N40.1 BENIGN PROSTATIC HYPERPLASIA WITH LOWER URINARY TRACT SYMPTOMS, SYMPTOM DETAILS UNSPECIFIED: ICD-10-CM

## 2021-07-17 DIAGNOSIS — N20.1 URETEROLITHIASIS: Primary | ICD-10-CM

## 2021-07-17 PROBLEM — R93.5 ABNORMAL ABDOMINAL CT SCAN: Status: ACTIVE | Noted: 2021-07-17

## 2021-07-17 LAB
ALBUMIN SERPL BCP-MCNC: 3.5 G/DL (ref 3.5–5)
ALP SERPL-CCNC: 45 U/L (ref 46–116)
ALT SERPL W P-5'-P-CCNC: 32 U/L (ref 12–78)
ANION GAP SERPL CALCULATED.3IONS-SCNC: 9 MMOL/L (ref 4–13)
AST SERPL W P-5'-P-CCNC: 25 U/L (ref 5–45)
BACTERIA UR QL AUTO: ABNORMAL /HPF
BASOPHILS # BLD AUTO: 0.03 THOUSANDS/ΜL (ref 0–0.1)
BASOPHILS NFR BLD AUTO: 0 % (ref 0–1)
BILIRUB SERPL-MCNC: 0.92 MG/DL (ref 0.2–1)
BILIRUB UR QL STRIP: NEGATIVE
BUN SERPL-MCNC: 26 MG/DL (ref 5–25)
CALCIUM SERPL-MCNC: 8.8 MG/DL (ref 8.3–10.1)
CHLORIDE SERPL-SCNC: 105 MMOL/L (ref 100–108)
CLARITY UR: CLEAR
CO2 SERPL-SCNC: 26 MMOL/L (ref 21–32)
COLOR UR: YELLOW
CREAT SERPL-MCNC: 1 MG/DL (ref 0.6–1.3)
EOSINOPHIL # BLD AUTO: 0.03 THOUSAND/ΜL (ref 0–0.61)
EOSINOPHIL NFR BLD AUTO: 0 % (ref 0–6)
ERYTHROCYTE [DISTWIDTH] IN BLOOD BY AUTOMATED COUNT: 13.5 % (ref 11.6–15.1)
GFR SERPL CREATININE-BSD FRML MDRD: 75 ML/MIN/1.73SQ M
GLUCOSE SERPL-MCNC: 116 MG/DL (ref 65–140)
GLUCOSE UR STRIP-MCNC: NEGATIVE MG/DL
HCT VFR BLD AUTO: 48.3 % (ref 36.5–49.3)
HGB BLD-MCNC: 15.8 G/DL (ref 12–17)
HGB UR QL STRIP.AUTO: ABNORMAL
IMM GRANULOCYTES # BLD AUTO: 0.08 THOUSAND/UL (ref 0–0.2)
IMM GRANULOCYTES NFR BLD AUTO: 1 % (ref 0–2)
KETONES UR STRIP-MCNC: NEGATIVE MG/DL
LEUKOCYTE ESTERASE UR QL STRIP: NEGATIVE
LYMPHOCYTES # BLD AUTO: 1.56 THOUSANDS/ΜL (ref 0.6–4.47)
LYMPHOCYTES NFR BLD AUTO: 17 % (ref 14–44)
MCH RBC QN AUTO: 32.3 PG (ref 26.8–34.3)
MCHC RBC AUTO-ENTMCNC: 32.7 G/DL (ref 31.4–37.4)
MCV RBC AUTO: 99 FL (ref 82–98)
MONOCYTES # BLD AUTO: 0.6 THOUSAND/ΜL (ref 0.17–1.22)
MONOCYTES NFR BLD AUTO: 7 % (ref 4–12)
NEUTROPHILS # BLD AUTO: 6.97 THOUSANDS/ΜL (ref 1.85–7.62)
NEUTS SEG NFR BLD AUTO: 75 % (ref 43–75)
NITRITE UR QL STRIP: NEGATIVE
NON-SQ EPI CELLS URNS QL MICRO: ABNORMAL /HPF
NRBC BLD AUTO-RTO: 0 /100 WBCS
PH UR STRIP.AUTO: 5.5 [PH]
PLATELET # BLD AUTO: 226 THOUSANDS/UL (ref 149–390)
PMV BLD AUTO: 9.9 FL (ref 8.9–12.7)
POTASSIUM SERPL-SCNC: 3.9 MMOL/L (ref 3.5–5.3)
PROT SERPL-MCNC: 6.7 G/DL (ref 6.4–8.2)
PROT UR STRIP-MCNC: NEGATIVE MG/DL
RBC # BLD AUTO: 4.89 MILLION/UL (ref 3.88–5.62)
RBC #/AREA URNS AUTO: ABNORMAL /HPF
SODIUM SERPL-SCNC: 140 MMOL/L (ref 136–145)
SP GR UR STRIP.AUTO: >=1.03 (ref 1–1.03)
UROBILINOGEN UR QL STRIP.AUTO: 1 E.U./DL
WBC # BLD AUTO: 9.27 THOUSAND/UL (ref 4.31–10.16)
WBC #/AREA URNS AUTO: ABNORMAL /HPF

## 2021-07-17 PROCEDURE — 96375 TX/PRO/DX INJ NEW DRUG ADDON: CPT

## 2021-07-17 PROCEDURE — 96374 THER/PROPH/DIAG INJ IV PUSH: CPT

## 2021-07-17 PROCEDURE — 99285 EMERGENCY DEPT VISIT HI MDM: CPT | Performed by: PHYSICIAN ASSISTANT

## 2021-07-17 PROCEDURE — 99220 PR INITIAL OBSERVATION CARE/DAY 70 MINUTES: CPT | Performed by: INTERNAL MEDICINE

## 2021-07-17 PROCEDURE — 85025 COMPLETE CBC W/AUTO DIFF WBC: CPT | Performed by: PHYSICIAN ASSISTANT

## 2021-07-17 PROCEDURE — 36415 COLL VENOUS BLD VENIPUNCTURE: CPT | Performed by: PHYSICIAN ASSISTANT

## 2021-07-17 PROCEDURE — 87086 URINE CULTURE/COLONY COUNT: CPT | Performed by: PHYSICIAN ASSISTANT

## 2021-07-17 PROCEDURE — 96361 HYDRATE IV INFUSION ADD-ON: CPT

## 2021-07-17 PROCEDURE — 80053 COMPREHEN METABOLIC PANEL: CPT | Performed by: PHYSICIAN ASSISTANT

## 2021-07-17 PROCEDURE — 99285 EMERGENCY DEPT VISIT HI MDM: CPT

## 2021-07-17 PROCEDURE — 74176 CT ABD & PELVIS W/O CONTRAST: CPT

## 2021-07-17 PROCEDURE — 81001 URINALYSIS AUTO W/SCOPE: CPT | Performed by: PHYSICIAN ASSISTANT

## 2021-07-17 RX ORDER — SODIUM CHLORIDE 9 MG/ML
75 INJECTION, SOLUTION INTRAVENOUS CONTINUOUS
Status: DISCONTINUED | OUTPATIENT
Start: 2021-07-17 | End: 2021-07-18 | Stop reason: HOSPADM

## 2021-07-17 RX ORDER — LABETALOL 20 MG/4 ML (5 MG/ML) INTRAVENOUS SYRINGE
5 EVERY 6 HOURS PRN
Status: DISCONTINUED | OUTPATIENT
Start: 2021-07-17 | End: 2021-07-17

## 2021-07-17 RX ORDER — ONDANSETRON 2 MG/ML
4 INJECTION INTRAMUSCULAR; INTRAVENOUS EVERY 6 HOURS PRN
Status: DISCONTINUED | OUTPATIENT
Start: 2021-07-17 | End: 2021-07-18 | Stop reason: HOSPADM

## 2021-07-17 RX ORDER — LABETALOL 20 MG/4 ML (5 MG/ML) INTRAVENOUS SYRINGE
10 EVERY 6 HOURS PRN
Status: DISCONTINUED | OUTPATIENT
Start: 2021-07-17 | End: 2021-07-18

## 2021-07-17 RX ORDER — KETOROLAC TROMETHAMINE 30 MG/ML
15 INJECTION, SOLUTION INTRAMUSCULAR; INTRAVENOUS ONCE
Status: COMPLETED | OUTPATIENT
Start: 2021-07-17 | End: 2021-07-17

## 2021-07-17 RX ORDER — IBUPROFEN 600 MG/1
TABLET ORAL EVERY 6 HOURS PRN
COMMUNITY
Start: 2021-07-16 | End: 2021-07-18 | Stop reason: HOSPADM

## 2021-07-17 RX ORDER — LISINOPRIL 20 MG/1
20 TABLET ORAL
Status: DISCONTINUED | OUTPATIENT
Start: 2021-07-17 | End: 2021-07-18 | Stop reason: HOSPADM

## 2021-07-17 RX ORDER — LEVOFLOXACIN 5 MG/ML
500 INJECTION, SOLUTION INTRAVENOUS ONCE
Status: CANCELLED | OUTPATIENT
Start: 2021-07-17 | End: 2021-07-17

## 2021-07-17 RX ORDER — CEFAZOLIN SODIUM 2 G/50ML
2000 SOLUTION INTRAVENOUS ONCE
Status: COMPLETED | OUTPATIENT
Start: 2021-07-17 | End: 2021-07-17

## 2021-07-17 RX ORDER — TAMSULOSIN HYDROCHLORIDE 0.4 MG/1
0.4 CAPSULE ORAL
Status: DISCONTINUED | OUTPATIENT
Start: 2021-07-17 | End: 2021-07-18

## 2021-07-17 RX ORDER — FAMOTIDINE 20 MG/1
20 TABLET, FILM COATED ORAL 2 TIMES DAILY
Status: DISCONTINUED | OUTPATIENT
Start: 2021-07-17 | End: 2021-07-18 | Stop reason: HOSPADM

## 2021-07-17 RX ORDER — ACETAMINOPHEN 325 MG/1
650 TABLET ORAL EVERY 6 HOURS PRN
Status: DISCONTINUED | OUTPATIENT
Start: 2021-07-17 | End: 2021-07-18 | Stop reason: HOSPADM

## 2021-07-17 RX ORDER — KETOROLAC TROMETHAMINE 30 MG/ML
15 INJECTION, SOLUTION INTRAMUSCULAR; INTRAVENOUS EVERY 6 HOURS PRN
Status: DISCONTINUED | OUTPATIENT
Start: 2021-07-17 | End: 2021-07-18

## 2021-07-17 RX ORDER — OXYCODONE HYDROCHLORIDE AND ACETAMINOPHEN 5; 325 MG/1; MG/1
1 TABLET ORAL EVERY 6 HOURS PRN
COMMUNITY
Start: 2021-07-16 | End: 2021-07-18 | Stop reason: HOSPADM

## 2021-07-17 RX ADMIN — KETOROLAC TROMETHAMINE 15 MG: 30 INJECTION, SOLUTION INTRAMUSCULAR at 09:53

## 2021-07-17 RX ADMIN — SODIUM CHLORIDE 1000 ML: 0.9 INJECTION, SOLUTION INTRAVENOUS at 11:34

## 2021-07-17 RX ADMIN — FAMOTIDINE 20 MG: 20 TABLET ORAL at 17:05

## 2021-07-17 RX ADMIN — FAMOTIDINE 20 MG: 20 TABLET ORAL at 13:40

## 2021-07-17 RX ADMIN — ENOXAPARIN SODIUM 40 MG: 40 INJECTION SUBCUTANEOUS at 13:40

## 2021-07-17 RX ADMIN — CEFAZOLIN SODIUM 2000 MG: 2 SOLUTION INTRAVENOUS at 11:39

## 2021-07-17 RX ADMIN — LABETALOL 20 MG/4 ML (5 MG/ML) INTRAVENOUS SYRINGE 10 MG: at 13:40

## 2021-07-17 RX ADMIN — SODIUM CHLORIDE 125 ML/HR: 0.9 INJECTION, SOLUTION INTRAVENOUS at 21:00

## 2021-07-17 RX ADMIN — SODIUM CHLORIDE 125 ML/HR: 0.9 INJECTION, SOLUTION INTRAVENOUS at 13:40

## 2021-07-17 RX ADMIN — TAMSULOSIN HYDROCHLORIDE 0.4 MG: 0.4 CAPSULE ORAL at 13:40

## 2021-07-17 RX ADMIN — SODIUM CHLORIDE 1000 ML: 0.9 INJECTION, SOLUTION INTRAVENOUS at 09:45

## 2021-07-17 NOTE — CONSULTS
H&P Exam - Urology       Patient: Linda Lovett   : 1950 Sex: male   MRN: [id number]     CSN: [id number]      History of Present Illness   HPI:  Linda Lovett is a 70 y o  male known to me with history of kidney stones and voiding complaints status post right ESWL/TURP years ago presented to [facility] ER a few days ago with severe left flank pain found on ER CT scan to have a 5 mm left proximal stone he was sent out on tamsulosin and analgesics only to note severe pain early this morning presenting [facility] after discussion with me undergoing repeat CT scan now confirming migration of his left proximal ureteral stone to left mid ureter patient was admitted for analgesics and urologic consult called for seen at the bedside at this time stating his pain is a for added 10 he is deathly afraid of yet another attack and wishes to have the stone removed soon as possible if he does not pass it           Review of Systems:   Constitutional:  Negative for activity change, fever, chills and diaphoresis  HENT: Negative for hearing loss and trouble swallowing  Eyes: Negative for itching and visual disturbance  Respiratory: Negative for chest tightness and shortness of breath  Cardiovascular: Negative for chest pain, edema  Gastrointestinal: Negative for abdominal distention, na abdominal pain, constipation, diarrhea, Nausea and vomiting  Genitourinary: Negative for decreased urine volume, difficulty urinating, dysuria, enuresis, frequency, hematuria and urgency  Musculoskeletal: Negative for gait problem and myalgias  Neurological: Negative for dizziness and headaches  Hematological: Does not bruise/bleed easily         Historical Information   Past Medical History:   Diagnosis Date    BPH (benign prostatic hyperplasia)     Hypertension     Kidney calculi     left     Past Surgical History:   Procedure Laterality Date    EXTRACORPOREAL SHOCK WAVE LITHOTRIPSY  FRACTURE SURGERY Right     ankle with hardware    TRANSURETHRAL RESECTION OF PROSTATE       Social History   Social History     Substance and Sexual Activity   Alcohol Use Yes    Comment: occ     Social History     Substance and Sexual Activity   Drug Use No     Social History     Tobacco Use   Smoking Status Former Smoker    Quit date:     Years since quittin 5   Smokeless Tobacco Never Used     Family History: No family history on file  Meds/Allergies   Medications Prior to Admission   Medication    ibuprofen (MOTRIN) 600 mg tablet    lisinopril (ZESTRIL) 20 mg tablet    oxyCODONE-acetaminophen (PERCOCET) 5-325 mg per tablet    tamsulosin (FLOMAX) 0 4 mg    ergocalciferol (VITAMIN D2) 50,000 units    loratadine (CLARITIN) 10 mg tablet     No Known Allergies    Objective   Vitals: /95 (BP Location: Right arm)   Pulse 63   Temp 98 1 °F (36 7 °C) (Oral)   Resp (!) 24   Ht 5' 8" (1 727 m)   Wt 75 2 kg (165 lb 12 6 oz)   SpO2 96%   BMI 25 21 kg/m²     Physical Exam:  General Alert awake   Normocephalic atraumatic PERRLA  Lungs clear bilaterally  Cardiac normal S1 normal S2  Abdomen soft, flank pain left  Two normal testicles  Extremities no edema    I/O last 24 hours: In: 9709 [P O :210;  I V :5; IV Piggyback:1000]  Out: -     Invasive Devices     Peripheral Intravenous Line            Peripheral IV 21 Left Hand <1 day                    Lab Results: CBC:   Lab Results   Component Value Date    WBC 9 27 2021    HGB 15 8 2021    HCT 48 3 2021    MCV 99 (H) 2021     2021    MCH 32 3 2021    MCHC 32 7 2021    RDW 13 5 2021    MPV 9 9 2021    NRBC 0 2021     CMP:   Lab Results   Component Value Date     2021    CO2 26 2021    BUN 26 (H) 2021    CREATININE 1 00 2021    CALCIUM 8 8 2021    AST 25 2021    ALT 32 2021    ALKPHOS 45 (L) 2021    EGFR 75 2021 Urinalysis:   Lab Results   Component Value Date    COLORU Yellow 07/17/2021    CLARITYU Clear 07/17/2021    SPECGRAV >=1 030 07/17/2021    PHUR 5 5 07/17/2021    PHUR 6 0 01/03/2019    LEUKOCYTESUR Negative 07/17/2021    NITRITE Negative 07/17/2021    GLUCOSEU Negative 07/17/2021    KETONESU Negative 07/17/2021    BILIRUBINUR Negative 07/17/2021    BLOODU Trace-Intact (A) 07/17/2021     Urine Culture: No results found for: URINECX  PSA:   Lab Results   Component Value Date    PSA 1 4 09/11/2020    PSA 1 2 05/22/2019           Assessment/ Plan:  Left 5 mm mid ureteral stone does have a 70-80% chance of passing with medical expulsion therapy over a 4 week interval with tamsulosin/Toradol strain urine  Patient in fear of yet another attack wishes to have it out if he does not pass it on this admission  Plan tamsulosin 0 4 mg  Strain urine  NPO after midnight  Cysto left ureteroscopy laser lithotripsy stent placement tomorrow if does not pass stone      Nabila Pop MD

## 2021-07-17 NOTE — PLAN OF CARE
Problem: PAIN - ADULT  Goal: Verbalizes/displays adequate comfort level or baseline comfort level  Description: Interventions:  - Encourage patient to monitor pain and request assistance  - Assess pain using appropriate pain scale  - Administer analgesics based on type and severity of pain and evaluate response  - Implement non-pharmacological measures as appropriate and evaluate response  - Consider cultural and social influences on pain and pain management  - Notify physician/advanced practitioner if interventions unsuccessful or patient reports new pain  Outcome: Progressing     Problem: SAFETY ADULT  Goal: Maintain or return to baseline ADL function  Description: INTERVENTIONS:  -  Assess patient's ability to carry out ADLs; assess patient's baseline for ADL function and identify physical deficits which impact ability to perform ADLs (bathing, care of mouth/teeth, toileting, grooming, dressing, etc )  - Assess/evaluate cause of self-care deficits   - Assess range of motion  - Assess patient's mobility; develop plan if impaired  - Assess patient's need for assistive devices and provide as appropriate  - Encourage maximum independence but intervene and supervise when necessary  - Involve family in performance of ADLs  - Assess for home care needs following discharge   - Consider OT consult to assist with ADL evaluation and planning for discharge  - Provide patient education as appropriate  Outcome: Progressing     Problem: Knowledge Deficit  Goal: Patient/family/caregiver demonstrates understanding of disease process, treatment plan, medications, and discharge instructions  Description: Complete learning assessment and assess knowledge base    Interventions:  - Provide teaching at level of understanding  - Provide teaching via preferred learning methods  Outcome: Progressing

## 2021-07-17 NOTE — ASSESSMENT & PLAN NOTE
Incidentally noted to have hepatic cysts and left adenoma  Patient and family is aware about the findings

## 2021-07-17 NOTE — ED PROVIDER NOTES
History  Chief Complaint   Patient presents with    Hip Pain     Pain started 2 days ago on the left leg and going to the left hip to the left flank where he states he had a kidney stone removed  Patient is a 70year old male with a past medical history of BPH, hypertension and kidney stones, presenting to the ED for evaluation of left flank pain x4 days  The pain starts in the left hip and radiates to the left flank  He also reports some pain radiating down the left thigh  He was seen 2 days ago at [facility] and a CT showed an obstructing 5 mm stone in the proximal one third of the left ureter with mild left hydronephrosis  Patient was d/c with flomax/percocet and states that he has an outpatient urology appointment on Wednesday  Patient has been taking the medications at home with some relief but is having continued pain  He currently rates his pain at a 7-8/10  He has not taken any pain medications today  He also reports associated nausea when the pain worsens  He denies fevers, chills, vomiting, abdominal pain, dysuria, gross hematuria, diarrhea or constipation  He does report increased urinary urgency/frequency at nighttime but says this is chronic secondary to prostate issues  Prior to Admission Medications   Prescriptions Last Dose Informant Patient Reported? Taking?   ergocalciferol (VITAMIN D2) 50,000 units Not Taking at Unknown time  Yes No   Sig: Take 50,000 Units by mouth once a week   Patient not taking: Reported on 7/17/2021   ibuprofen (MOTRIN) 600 mg tablet 7/16/2021 at Unknown time Self Yes Yes   Sig: Take by mouth every 6 (six) hours as needed for mild pain (for 5 dayss)   lisinopril (ZESTRIL) 20 mg tablet 7/17/2021 at Unknown time  Yes Yes   Sig: Take 20 mg by mouth daily in the early morning     loratadine (CLARITIN) 10 mg tablet Not Taking at Unknown time  Yes No   Sig: Take 10 mg by mouth daily   Patient not taking: Reported on 7/17/2021   oxyCODONE-acetaminophen (PERCOCET) 5-325 mg per tablet 2021 at Unknown time  Yes Yes   Sig: Take 1 tablet by mouth every 6 (six) hours as needed for moderate pain   tamsulosin (FLOMAX) 0 4 mg 2021 at Unknown time  Yes Yes   Sig: Take 0 4 mg by mouth daily at bedtime  Facility-Administered Medications: None       Past Medical History:   Diagnosis Date    BPH (benign prostatic hyperplasia)     Hypertension     Kidney calculi     left       Past Surgical History:   Procedure Laterality Date    EXTRACORPOREAL SHOCK WAVE LITHOTRIPSY      FRACTURE SURGERY Right     ankle with hardware    TRANSURETHRAL RESECTION OF PROSTATE         No family history on file  I have reviewed and agree with the history as documented  E-Cigarette/Vaping     E-Cigarette/Vaping Substances     Social History     Tobacco Use    Smoking status: Former Smoker     Quit date:      Years since quittin 5    Smokeless tobacco: Never Used   Substance Use Topics    Alcohol use: Yes     Comment: occ    Drug use: No       Review of Systems   Constitutional: Negative for chills, diaphoresis, fatigue and fever  HENT: Negative for congestion, ear pain, mouth sores, rhinorrhea, sinus pain, sore throat and trouble swallowing  Eyes: Negative for photophobia and visual disturbance  Respiratory: Negative for cough, chest tightness, shortness of breath and wheezing  Cardiovascular: Negative for chest pain, palpitations and leg swelling  Gastrointestinal: Negative for abdominal pain, blood in stool, constipation, diarrhea, nausea and vomiting  Genitourinary: Positive for flank pain (left)  Negative for decreased urine volume, dysuria, frequency, hematuria and urgency  Musculoskeletal: Positive for arthralgias (left hip pain)  Negative for back pain, gait problem, joint swelling, myalgias and neck pain  Skin: Negative for color change, pallor and rash     Neurological: Negative for dizziness, syncope, speech difficulty, weakness, light-headedness, numbness and headaches  Psychiatric/Behavioral: Negative for confusion and sleep disturbance  All other systems reviewed and are negative  Physical Exam  Physical Exam  Vitals and nursing note reviewed  Constitutional:       General: He is awake  He is not in acute distress  Appearance: Normal appearance  He is well-developed  He is not ill-appearing or diaphoretic  HENT:      Head: Normocephalic and atraumatic  Right Ear: External ear normal       Left Ear: External ear normal       Nose: Nose normal       Mouth/Throat:      Lips: Pink  Mouth: Mucous membranes are moist    Eyes:      General: Lids are normal  No scleral icterus  Conjunctiva/sclera: Conjunctivae normal       Pupils: Pupils are equal, round, and reactive to light  Cardiovascular:      Rate and Rhythm: Normal rate and regular rhythm  Pulses: Normal pulses  Radial pulses are 2+ on the right side and 2+ on the left side  Heart sounds: Normal heart sounds, S1 normal and S2 normal    Pulmonary:      Effort: Pulmonary effort is normal  No accessory muscle usage  Breath sounds: Normal breath sounds  No stridor  No decreased breath sounds, wheezing, rhonchi or rales  Abdominal:      General: Abdomen is flat  Bowel sounds are normal  There is no distension  Palpations: Abdomen is soft  Tenderness: There is no abdominal tenderness  There is left CVA tenderness  There is no right CVA tenderness, guarding or rebound  Musculoskeletal:      Cervical back: Full passive range of motion without pain, normal range of motion and neck supple  No signs of trauma  No pain with movement  Normal range of motion  Right lower leg: No edema  Left lower leg: No edema  Lymphadenopathy:      Cervical: No cervical adenopathy  Skin:     General: Skin is warm and dry  Capillary Refill: Capillary refill takes less than 2 seconds        Coloration: Skin is not cyanotic, jaundiced or pale  Neurological:      Mental Status: He is alert and oriented to person, place, and time  GCS: GCS eye subscore is 4  GCS verbal subscore is 5  GCS motor subscore is 6  Cranial Nerves: No dysarthria or facial asymmetry  Gait: Gait normal    Psychiatric:         Attention and Perception: Attention normal          Mood and Affect: Mood normal          Speech: Speech normal          Behavior: Behavior normal  Behavior is cooperative           Vital Signs  ED Triage Vitals [07/17/21 0905]   Temperature Pulse Respirations Blood Pressure SpO2   98 °F (36 7 °C) 89 18 (!) 161/103 98 %      Temp Source Heart Rate Source Patient Position - Orthostatic VS BP Location FiO2 (%)   Temporal -- Lying Left arm --      Pain Score       8           Vitals:    07/17/21 1228 07/17/21 1233 07/17/21 1333 07/17/21 1505   BP: (!) 213/108 (!) 180/102 (!) 182/110 161/95   Pulse: 64   63   Patient Position - Orthostatic VS: Lying Lying  Lying         Visual Acuity      ED Medications  Medications   lisinopril (ZESTRIL) tablet 20 mg (20 mg Oral Not Given 7/17/21 1352)   tamsulosin (FLOMAX) capsule 0 4 mg (0 4 mg Oral Given 7/17/21 1340)   acetaminophen (TYLENOL) tablet 650 mg (has no administration in time range)   ketorolac (TORADOL) injection 15 mg (has no administration in time range)   famotidine (PEPCID) tablet 20 mg (20 mg Oral Given 7/17/21 1340)   sodium chloride 0 9 % infusion (125 mL/hr Intravenous New Bag 7/17/21 1340)   Labetalol HCl (NORMODYNE) injection 10 mg (10 mg Intravenous Given 7/17/21 1340)   ondansetron (ZOFRAN) injection 4 mg (has no administration in time range)   sodium chloride 0 9 % bolus 1,000 mL (0 mL Intravenous Stopped 7/17/21 1044)   ketorolac (TORADOL) injection 15 mg (15 mg Intravenous Given 7/17/21 0953)   sodium chloride 0 9 % bolus 1,000 mL (1,000 mL Intravenous New Bag 7/17/21 1134)   ceFAZolin (ANCEF) IVPB (premix in dextrose) 2,000 mg 50 mL (2,000 mg Intravenous New Bag 7/17/21 1139)   enoxaparin (LOVENOX) subcutaneous injection 40 mg (40 mg Subcutaneous Given 7/17/21 1340)       Diagnostic Studies  Results Reviewed     Procedure Component Value Units Date/Time    Urine culture [[id number]] Collected: 07/17/21 1024    Lab Status:  In process Specimen: Urine, Clean Catch Updated: 07/17/21 1148    Urine Microscopic [[id number]]  (Abnormal) Collected: 07/17/21 1024    Lab Status: Final result Specimen: Urine, Clean Catch Updated: 07/17/21 1040     RBC, UA 0-1 /hpf      WBC, UA 4-10 /hpf      Epithelial Cells Occasional /hpf      Bacteria, UA Occasional /hpf     UA w Reflex to Microscopic w Reflex to Culture [[id number]]  (Abnormal) Collected: 07/17/21 1024    Lab Status: Final result Specimen: Urine, Clean Catch Updated: 07/17/21 1031     Color, UA Yellow     Clarity, UA Clear     Specific Gravity, UA >=1 030     pH, UA 5 5     Leukocytes, UA Negative     Nitrite, UA Negative     Protein, UA Negative mg/dl      Glucose, UA Negative mg/dl      Ketones, UA Negative mg/dl      Urobilinogen, UA 1 0 E U /dl      Bilirubin, UA Negative     Blood, UA Trace-Intact    Comprehensive metabolic panel [[id number]]  (Abnormal) Collected: 07/17/21 0943    Lab Status: Final result Specimen: Blood from Hand, Left Updated: 07/17/21 1005     Sodium 140 mmol/L      Potassium 3 9 mmol/L      Chloride 105 mmol/L      CO2 26 mmol/L      ANION GAP 9 mmol/L      BUN 26 mg/dL      Creatinine 1 00 mg/dL      Glucose 116 mg/dL      Calcium 8 8 mg/dL      AST 25 U/L      ALT 32 U/L      Alkaline Phosphatase 45 U/L      Total Protein 6 7 g/dL      Albumin 3 5 g/dL      Total Bilirubin 0 92 mg/dL      eGFR 75 ml/min/1 73sq m     Narrative:      Chidi guidelines for Chronic Kidney Disease (CKD):     Stage 1 with normal or high GFR (GFR > 90 mL/min/1 73 square meters)    Stage 2 Mild CKD (GFR = 60-89 mL/min/1 73 square meters)    Stage 3A Moderate CKD (GFR = 45-59 mL/min/1 73 square meters)    Stage 3B Moderate CKD (GFR = 30-44 mL/min/1 73 square meters)    Stage 4 Severe CKD (GFR = 15-29 mL/min/1 73 square meters)    Stage 5 End Stage CKD (GFR <15 mL/min/1 73 square meters)  Note: GFR calculation is accurate only with a steady state creatinine    CBC and differential [[id number]]  (Abnormal) Collected: 07/17/21 0943    Lab Status: Final result Specimen: Blood from Hand, Left Updated: 07/17/21 0950     WBC 9 27 Thousand/uL      RBC 4 89 Million/uL      Hemoglobin 15 8 g/dL      Hematocrit 48 3 %      MCV 99 fL      MCH 32 3 pg      MCHC 32 7 g/dL      RDW 13 5 %      MPV 9 9 fL      Platelets 697 Thousands/uL      nRBC 0 /100 WBCs      Neutrophils Relative 75 %      Immat GRANS % 1 %      Lymphocytes Relative 17 %      Monocytes Relative 7 %      Eosinophils Relative 0 %      Basophils Relative 0 %      Neutrophils Absolute 6 97 Thousands/µL      Immature Grans Absolute 0 08 Thousand/uL      Lymphocytes Absolute 1 56 Thousands/µL      Monocytes Absolute 0 60 Thousand/µL      Eosinophils Absolute 0 03 Thousand/µL      Basophils Absolute 0 03 Thousands/µL                  CT renal stone study abdomen pelvis without contrast   Final Result by Chelsie Caputo MD (07/17 1120)      5 mm left ureteral stone causes mild hydronephrosis      The study was marked in EPIC for immediate notification  Workstation performed: [id number]                    Procedures  Procedures         ED Course  ED Course as of Jul 17 1518   Sat Jul 17, 2021   4039 Creatinine: 1 00   1016 WBC: 9 27                             SBIRT 20yo+      Most Recent Value   SBIRT (25 yo +)   In order to provide better care to our patients, we are screening all of our patients for alcohol and drug use  Would it be okay to ask you these screening questions?   No Filed at: 07/17/2021 0949                    MDM  Number of Diagnoses or Management Options  Left hip pain  Ureterolithiasis  UTI (urinary tract infection)  Diagnosis management comments: Patient is a 70year old male with a past medical history of BPH, hypertension and kidney stones, presenting to the ED for evaluation of left flank pain x4 days  CT showed a 5mm left ureteral stone causing mild hydronephrosis  Creatinine 1 00  Normal WBC  Urine has 4-10 WBC  Spoke with Dr Karon Apgar from urology who recommend starting Ancef and admit to medicine with procedure tomorrow if no improvement of pain or passage of stone  The management plan was discussed in detail with the patient at bedside and all questions were answered  Amount and/or Complexity of Data Reviewed  Clinical lab tests: ordered and reviewed  Tests in the radiology section of CPT®: ordered  Discuss the patient with other providers: yes (Dr Karon Apgar Dr Johann Dar)    Patient Progress  Patient progress: stable      Disposition  Final diagnoses:   Ureterolithiasis   Left hip pain   UTI (urinary tract infection)     Time reflects when diagnosis was documented in both MDM as applicable and the Disposition within this note     Time User Action Codes Description Comment    2021 11:35 AM Heena Sandoval Add [N20 1] Ureterolithiasis     2021 11:35 AM David Mora Add [M25 552] Left hip pain     2021 11:35 AM Heena Pacheoc Add [N39 0] UTI (urinary tract infection)       ED Disposition     ED Disposition Condition Date/Time Comment    Admit Stable Sat 2021 11:35 AM Case was discussed with Dr Kamran Bah and the patient's admission status was agreed to be Admission Status: observation status to the service of Dr Kamran Bah  Follow-up Information    None         Current Discharge Medication List      CONTINUE these medications which have NOT CHANGED    Details   ibuprofen (MOTRIN) 600 mg tablet Take by mouth every 6 (six) hours as needed for mild pain (for 5 dayss)      lisinopril (ZESTRIL) 20 mg tablet Take 20 mg by mouth daily in the early morning        oxyCODONE-acetaminophen (PERCOCET) 5-325 mg per tablet Take 1 tablet by mouth every 6 (six) hours as needed for moderate pain      tamsulosin (FLOMAX) 0 4 mg Take 0 4 mg by mouth daily at bedtime  ergocalciferol (VITAMIN D2) 50,000 units Take 50,000 Units by mouth once a week  Refills: 0      loratadine (CLARITIN) 10 mg tablet Take 10 mg by mouth daily  Refills: 0           No discharge procedures on file      PDMP Review     None          ED Provider  Electronically Signed by           Kenny Talley PA-C  07/17/21 5125

## 2021-07-17 NOTE — ED NOTES
Talked to May in the lab to add a urine culture with the specimen sent earlier       Italo Ayoub RN  07/17/21 6972

## 2021-07-17 NOTE — ASSESSMENT & PLAN NOTE
[facility]  No mention of BPH done here but mention of mild prostatomegaly on CT report from [facility]  Patient does report some chronic urinary frequency   · Advised to discuss with Urology regarding further continuation of tamsulosin

## 2021-07-17 NOTE — ASSESSMENT & PLAN NOTE
Uncontrolled in setting of pain on presentation, improved with pain control but still remains above goal  Asymptomatic  · Continue lisinopril  · Amlodipine added  · Advised to follow-up with PCP for workup for adrenal adenoma

## 2021-07-17 NOTE — H&P
History and Physical - [facility] Internal Medicine    Patient Information: Morenita Garvey 70 y o  male MRN: [id number]  Unit/Bed#: [address] [place] [address] Encounter: [id number]  Admitting Physician: Nadia Pinzon MD  PCP: Aftab Ken MD  Date of Admission:  07/17/21        Hospital Problem List:     Principal Problem:    Ureterolithiasis  Active Problems:    Hypertension    BPH (benign prostatic hyperplasia)    Abnormal abdominal CT scan      Assessment/Plan:    * Ureterolithiasis  Assessment & Plan  Presented with left-sided flank and hip pain since last Monday  Evaluated on 7/15 at [facility], [address] for the same, noted to have 5 mm obstructing stone on the left  Discharge on conservative management but presented due to persistent pain  Repeat CT of the abdomen persistent 5 mm left ureteral stone causing mild hydronephrosis  Patient afebrile, normal white count  Renal function stable  Reported mild burning urination  · IV fluid  · Pain control  · Strain urine  · Tamsulosin  · IV cefazolin pending culture results  · Urology evaluation, plan for cystoscopy in a m  If no improvement    Hypertension  Assessment & Plan  Uncontrolled in setting of pain on presentation  · Pain control  · Continue lisinopril  · Labetalol as needed      Abnormal abdominal CT scan  Assessment & Plan  Incidentally noted to have hepatic cysts and left adenoma  Patient and family is aware about the findings    BPH (benign prostatic hyperplasia)  Assessment & Plan  Continue tamsulosin          VTE Prophylaxis: Enoxaparin (Lovenox)  / sequential compression device   Code Status: Level 1 - Full Code    Anticipated Length of Stay:  Patient will be admitted on an Observation basis with an anticipated length of stay of  < 2 midnights  Justification for Hospital Stay:  Renal colic requiring treatment intervention    Total Time for Visit, including Counseling / Coordination of Care: 30 minutes    Greater than 50% of this total time spent on direct patient counseling and coordination of care  Discussed with patient's daughter over the phone   offered but patient declined, he preferred daughter's help    Chief Complaint:     Hip Pain (Pain started 2 days ago on the left leg and going to the left hip to the left flank where he states he had a kidney stone removed )    History of Present Illness:    Emily Miner is a 70 y o  male with history of nephrolithiasis, hypertension, BPH who presents with left-sided flank pain started around last Monday  Patient reported that he experienced left hip pain initially but subsequently noted left flank pain similar to his prior kidney stone  Patient was evaluated at [facility] on 7/15 noted to have 5 mm obstructing stone on the left side, patient was discharged on conservative management  Patient was also educated about the incidental finding of hepatic cyst and left adrenal nodule  Over next 2 days patient pain persist without any improvement and he presented to ED for further evaluation  Patient reported associated nausea, denies any fever, chills abdominal pain, gross hematuria, or other GI complaints  Patient did report chronic urinary frequency and mild dysuria  In ED, patient's blood pressure was significantly elevated  Other vital signs were stable  Labs were largely unremarkable  CT of the abdomen revealed persistent to 5 mm left ureteral stone causing mild hydronephrosis  Case was discussed with Urology and patient was subsequently admitted  Review of Systems:    Review of Systems   Constitutional: Negative for activity change, chills and fever  HENT: Negative for sore throat  Respiratory: Negative for cough, chest tightness and shortness of breath  Cardiovascular: Negative for chest pain, palpitations and leg swelling  Gastrointestinal: Positive for nausea  Negative for abdominal pain, diarrhea and vomiting     Genitourinary: Positive for flank pain and frequency  Negative for difficulty urinating  Musculoskeletal:        Left hip/thigh pain   Neurological: Negative for dizziness, light-headedness and headaches  Hematological: Does not bruise/bleed easily  Psychiatric/Behavioral: Negative for behavioral problems  Past Medical and Surgical History:     Past Medical History:   Diagnosis Date    BPH (benign prostatic hyperplasia)     Hypertension     Kidney calculi     left       Past Surgical History:   Procedure Laterality Date    EXTRACORPOREAL SHOCK WAVE LITHOTRIPSY      FRACTURE SURGERY Right     ankle with hardware    TRANSURETHRAL RESECTION OF PROSTATE         Meds/Allergies:    PTA meds:   Prior to Admission Medications   Prescriptions Last Dose Informant Patient Reported? Taking?   ergocalciferol (VITAMIN D2) 50,000 units 2021 at Unknown time  Yes Yes   Sig: Take 50,000 Units by mouth once a week   lisinopril (ZESTRIL) 20 mg tablet 2021 at Unknown time  Yes Yes   Sig: Take 20 mg by mouth daily in the early morning  loratadine (CLARITIN) 10 mg tablet Not Taking at Unknown time  Yes No   Sig: Take 10 mg by mouth daily   Patient not taking: Reported on 2021   tamsulosin (FLOMAX) 0 4 mg 2021 at Unknown time  Yes Yes   Sig: Take 0 4 mg by mouth daily at bedtime  Facility-Administered Medications: None       Allergies: No Known Allergies  History:     Marital Status:      Substance Use History:   Social History     Substance and Sexual Activity   Alcohol Use Yes    Comment: occ     Social History     Tobacco Use   Smoking Status Former Smoker    Quit date:     Years since quittin 5   Smokeless Tobacco Never Used     Social History     Substance and Sexual Activity   Drug Use No       Family History:    No family history on file      Physical Exam:     Vitals:   Blood Pressure: (!) 180/102 (21 1233)  Pulse: 64 (21 1228)  Temperature: 98 2 °F (36 8 °C) (21 1228)  Temp Source: Tympanic (07/17/21 1228)  Respirations: 18 (07/17/21 1228)  Weight - Scale: 75 2 kg (165 lb 12 6 oz) (07/17/21 1228)  SpO2: 97 % (07/17/21 1228)    Physical Exam  Constitutional:       General: He is not in acute distress  HENT:      Head: Normocephalic and atraumatic  Cardiovascular:      Rate and Rhythm: Normal rate  Pulmonary:      Effort: Pulmonary effort is normal  No respiratory distress  Breath sounds: No wheezing, rhonchi or rales  Chest:      Chest wall: No tenderness  Abdominal:      General: Bowel sounds are normal  There is no distension  Palpations: Abdomen is soft  Tenderness: There is no abdominal tenderness  There is no guarding or rebound  Musculoskeletal:      Right lower leg: No edema  Left lower leg: No edema  Skin:     General: Skin is warm and dry  Findings: No rash  Neurological:      General: No focal deficit present  Mental Status: He is alert and oriented to person, place, and time  Mental status is at baseline  Cranial Nerves: No cranial nerve deficit  Psychiatric:         Mood and Affect: Mood normal          Behavior: Behavior normal                  Lab Results: I have personally reviewed pertinent reports  Results from last 7 days   Lab Units 07/17/21  0943   WBC Thousand/uL 9 27   HEMOGLOBIN g/dL 15 8   HEMATOCRIT % 48 3   PLATELETS Thousands/uL 226   NEUTROS PCT % 75   LYMPHS PCT % 17   MONOS PCT % 7   EOS PCT % 0     Results from last 7 days   Lab Units 07/17/21  0943   POTASSIUM mmol/L 3 9   CHLORIDE mmol/L 105   CO2 mmol/L 26   BUN mg/dL 26*   CREATININE mg/dL 1 00   CALCIUM mg/dL 8 8   ALK PHOS U/L 45*   ALT U/L 32   AST U/L 25           Imaging: I have personally reviewed pertinent reports  CT renal stone study abdomen pelvis without contrast    Result Date: 7/17/2021  Narrative: CT ABDOMEN AND PELVIS WITHOUT IV CONTRAST - LOW DOSE RENAL STONE INDICATION:   left flank pain, hx stones   COMPARISON:  CT 9/14/15 TECHNIQUE:  Low dose thin section CT examination of the abdomen and pelvis was performed without intravenous or oral contrast according to a protocol specifically designed to evaluate for urinary tract calculus  Axial, sagittal, and coronal 2D reformatted images were created from the source data and submitted for interpretation  Evaluation for pathology in the abdomen and pelvis that is unrelated to urinary tract calculi is limited  Radiation dose length product (DLP) for this visit:  362 mGy-cm   This examination, like all CT scans performed in the [facility], was performed utilizing techniques to minimize radiation dose exposure, including the use of iterative reconstruction and automated exposure control  FINDINGS: LOWER CHEST:  No clinically significant abnormality identified in the visualized lower chest  LIVER/BILIARY TREE:  7 4 and 3 2 cm left hepatic cysts, very mildly increased from prior  There are additional too small to characterize hypodensities within the liver GALLBLADDER:  No calcified gallstones  No pericholecystic inflammatory change  SPLEEN:  Unremarkable  PANCREAS:  Unremarkable  ADRENAL GLANDS:  3 6 x 2 6 left adrenal adenoma KIDNEYS/URETERS:  5 mm stone noted in the left mid ureter  There is very mild hydronephrosis STOMACH AND BOWEL:  There is colonic diverticulosis without evidence of acute diverticulitis  APPENDIX:  A normal appendix was visualized  ABDOMINOPELVIC CAVITY:  No ascites or free intraperitoneal air  No lymphadenopathy  VESSELS:  Unremarkable for patient's age  PELVIS REPRODUCTIVE ORGANS:  Unremarkable for patient's age  URINARY BLADDER:  Unremarkable  ABDOMINAL WALL/INGUINAL REGIONS:  Bilateral inguinal hernias OSSEOUS STRUCTURES:  No acute fracture or destructive osseous lesion  Impression: 5 mm left ureteral stone causes mild hydronephrosis The study was marked in EPIC for immediate notification   Workstation performed: [id number]       CT renal stone study abdomen pelvis without contrast   Final Result      5 mm left ureteral stone causes mild hydronephrosis      The study was marked in EPIC for immediate notification  Workstation performed: [id number]             EKG, Pathology, and Other Studies Reviewed on Admission:   · None    Allscripts/EPIC Records Reviewed: Yes     ** Please Note: "This note has been constructed using a voice recognition system  Therefore there may be syntax, spelling, and/or grammatical errors   Please call if you have any questions  "**

## 2021-07-17 NOTE — ASSESSMENT & PLAN NOTE
Presented with left-sided flank and hip pain since last Monday  Evaluated on 7/15 at [facility] for the same, noted to have 5 mm obstructing stone on the left  Discharge on conservative management but presented due to persistent pain  Repeat CT of the abdomen persistent 5 mm left ureteral stone causing mild hydronephrosis  Patient remained afebrile, normal white count  Renal function stable  Seen by Urology, input appreciated  Underwent cystoscopy, ureteroscopy with holmium laser lithotripsy, retrograde pyelogram insertion of the ureteral stent  Doing well postprocedure  Voiding without difficulty    Reports resolution of the pain  Urine culture negative  · Patient will be discharged home with follow-up with urology  · Educated about the follow-up plan

## 2021-07-18 ENCOUNTER — ANESTHESIA EVENT (OUTPATIENT)
Dept: PERIOP | Facility: HOSPITAL | Age: 71
End: 2021-07-18
Payer: MEDICARE

## 2021-07-18 ENCOUNTER — APPOINTMENT (OUTPATIENT)
Dept: RADIOLOGY | Facility: HOSPITAL | Age: 71
End: 2021-07-18
Payer: MEDICARE

## 2021-07-18 ENCOUNTER — ANESTHESIA (OUTPATIENT)
Dept: PERIOP | Facility: HOSPITAL | Age: 71
End: 2021-07-18
Payer: MEDICARE

## 2021-07-18 VITALS
BODY MASS INDEX: 25.13 KG/M2 | HEART RATE: 76 BPM | RESPIRATION RATE: 20 BRPM | TEMPERATURE: 98.1 F | OXYGEN SATURATION: 97 % | DIASTOLIC BLOOD PRESSURE: 105 MMHG | HEIGHT: 68 IN | SYSTOLIC BLOOD PRESSURE: 168 MMHG | WEIGHT: 165.79 LBS

## 2021-07-18 PROBLEM — N20.1 URETEROLITHIASIS: Status: RESOLVED | Noted: 2021-07-17 | Resolved: 2021-07-18

## 2021-07-18 LAB
ANION GAP SERPL CALCULATED.3IONS-SCNC: 11 MMOL/L (ref 4–13)
BACTERIA UR CULT: NORMAL
BASOPHILS # BLD AUTO: 0.03 THOUSANDS/ΜL (ref 0–0.1)
BASOPHILS NFR BLD AUTO: 0 % (ref 0–1)
BUN SERPL-MCNC: 16 MG/DL (ref 5–25)
CALCIUM SERPL-MCNC: 8.2 MG/DL (ref 8.3–10.1)
CHLORIDE SERPL-SCNC: 105 MMOL/L (ref 100–108)
CO2 SERPL-SCNC: 25 MMOL/L (ref 21–32)
CREAT SERPL-MCNC: 0.86 MG/DL (ref 0.6–1.3)
EOSINOPHIL # BLD AUTO: 0.05 THOUSAND/ΜL (ref 0–0.61)
EOSINOPHIL NFR BLD AUTO: 1 % (ref 0–6)
ERYTHROCYTE [DISTWIDTH] IN BLOOD BY AUTOMATED COUNT: 13.5 % (ref 11.6–15.1)
GFR SERPL CREATININE-BSD FRML MDRD: 87 ML/MIN/1.73SQ M
GLUCOSE P FAST SERPL-MCNC: 92 MG/DL (ref 65–99)
GLUCOSE SERPL-MCNC: 92 MG/DL (ref 65–140)
HCT VFR BLD AUTO: 48.4 % (ref 36.5–49.3)
HGB BLD-MCNC: 16.1 G/DL (ref 12–17)
IMM GRANULOCYTES # BLD AUTO: 0.08 THOUSAND/UL (ref 0–0.2)
IMM GRANULOCYTES NFR BLD AUTO: 1 % (ref 0–2)
LYMPHOCYTES # BLD AUTO: 2.11 THOUSANDS/ΜL (ref 0.6–4.47)
LYMPHOCYTES NFR BLD AUTO: 23 % (ref 14–44)
MCH RBC QN AUTO: 32.4 PG (ref 26.8–34.3)
MCHC RBC AUTO-ENTMCNC: 33.3 G/DL (ref 31.4–37.4)
MCV RBC AUTO: 97 FL (ref 82–98)
MONOCYTES # BLD AUTO: 0.63 THOUSAND/ΜL (ref 0.17–1.22)
MONOCYTES NFR BLD AUTO: 7 % (ref 4–12)
NEUTROPHILS # BLD AUTO: 6.36 THOUSANDS/ΜL (ref 1.85–7.62)
NEUTS SEG NFR BLD AUTO: 68 % (ref 43–75)
NRBC BLD AUTO-RTO: 0 /100 WBCS
PLATELET # BLD AUTO: 203 THOUSANDS/UL (ref 149–390)
PMV BLD AUTO: 9 FL (ref 8.9–12.7)
POTASSIUM SERPL-SCNC: 3.3 MMOL/L (ref 3.5–5.3)
RBC # BLD AUTO: 4.97 MILLION/UL (ref 3.88–5.62)
SODIUM SERPL-SCNC: 141 MMOL/L (ref 136–145)
WBC # BLD AUTO: 9.26 THOUSAND/UL (ref 4.31–10.16)

## 2021-07-18 PROCEDURE — 82360 CALCULUS ASSAY QUANT: CPT | Performed by: SPECIALIST

## 2021-07-18 PROCEDURE — 74018 RADEX ABDOMEN 1 VIEW: CPT

## 2021-07-18 PROCEDURE — 85025 COMPLETE CBC W/AUTO DIFF WBC: CPT | Performed by: INTERNAL MEDICINE

## 2021-07-18 PROCEDURE — C1769 GUIDE WIRE: HCPCS | Performed by: SPECIALIST

## 2021-07-18 PROCEDURE — 88300 SURGICAL PATH GROSS: CPT | Performed by: PATHOLOGY

## 2021-07-18 PROCEDURE — 80048 BASIC METABOLIC PNL TOTAL CA: CPT | Performed by: INTERNAL MEDICINE

## 2021-07-18 PROCEDURE — C2617 STENT, NON-COR, TEM W/O DEL: HCPCS | Performed by: SPECIALIST

## 2021-07-18 PROCEDURE — 99217 PR OBSERVATION CARE DISCHARGE MANAGEMENT: CPT | Performed by: INTERNAL MEDICINE

## 2021-07-18 DEVICE — INLAY URETERAL STENT W/O GUIDEWIRE
Type: IMPLANTABLE DEVICE | Site: URETER | Status: FUNCTIONAL
Brand: BARD® INLAY® URETERAL STENT

## 2021-07-18 RX ORDER — FENTANYL CITRATE/PF 50 MCG/ML
25 SYRINGE (ML) INJECTION
Status: DISCONTINUED | OUTPATIENT
Start: 2021-07-18 | End: 2021-07-18 | Stop reason: HOSPADM

## 2021-07-18 RX ORDER — AMLODIPINE BESYLATE 5 MG/1
5 TABLET ORAL DAILY
Status: DISCONTINUED | OUTPATIENT
Start: 2021-07-18 | End: 2021-07-18 | Stop reason: HOSPADM

## 2021-07-18 RX ORDER — TAMSULOSIN HYDROCHLORIDE 0.4 MG/1
0.4 CAPSULE ORAL
Refills: 0
Start: 2021-07-18 | End: 2021-07-21

## 2021-07-18 RX ORDER — LEVOFLOXACIN 5 MG/ML
INJECTION, SOLUTION INTRAVENOUS CONTINUOUS PRN
Status: DISCONTINUED | OUTPATIENT
Start: 2021-07-18 | End: 2021-07-18

## 2021-07-18 RX ORDER — DEXAMETHASONE SODIUM PHOSPHATE 10 MG/ML
INJECTION, SOLUTION INTRAMUSCULAR; INTRAVENOUS AS NEEDED
Status: DISCONTINUED | OUTPATIENT
Start: 2021-07-18 | End: 2021-07-18

## 2021-07-18 RX ORDER — SODIUM CHLORIDE, SODIUM LACTATE, POTASSIUM CHLORIDE, CALCIUM CHLORIDE 600; 310; 30; 20 MG/100ML; MG/100ML; MG/100ML; MG/100ML
INJECTION, SOLUTION INTRAVENOUS CONTINUOUS PRN
Status: DISCONTINUED | OUTPATIENT
Start: 2021-07-18 | End: 2021-07-18

## 2021-07-18 RX ORDER — ACETAMINOPHEN 325 MG/1
650 TABLET ORAL EVERY 6 HOURS PRN
Refills: 0
Start: 2021-07-18 | End: 2021-07-21

## 2021-07-18 RX ORDER — MAGNESIUM HYDROXIDE 1200 MG/15ML
LIQUID ORAL AS NEEDED
Status: DISCONTINUED | OUTPATIENT
Start: 2021-07-18 | End: 2021-07-18 | Stop reason: HOSPADM

## 2021-07-18 RX ORDER — AMLODIPINE BESYLATE 5 MG/1
5 TABLET ORAL DAILY
Qty: 30 TABLET | Refills: 0 | Status: SHIPPED | OUTPATIENT
Start: 2021-07-19 | End: 2021-07-21 | Stop reason: SDUPTHER

## 2021-07-18 RX ORDER — LIDOCAINE HYDROCHLORIDE 10 MG/ML
INJECTION, SOLUTION EPIDURAL; INFILTRATION; INTRACAUDAL; PERINEURAL AS NEEDED
Status: DISCONTINUED | OUTPATIENT
Start: 2021-07-18 | End: 2021-07-18

## 2021-07-18 RX ORDER — MEPERIDINE HYDROCHLORIDE 25 MG/ML
12.5 INJECTION INTRAMUSCULAR; INTRAVENOUS; SUBCUTANEOUS
Status: DISCONTINUED | OUTPATIENT
Start: 2021-07-18 | End: 2021-07-18 | Stop reason: HOSPADM

## 2021-07-18 RX ORDER — FENTANYL CITRATE 50 UG/ML
INJECTION, SOLUTION INTRAMUSCULAR; INTRAVENOUS AS NEEDED
Status: DISCONTINUED | OUTPATIENT
Start: 2021-07-18 | End: 2021-07-18

## 2021-07-18 RX ORDER — PROPOFOL 10 MG/ML
INJECTION, EMULSION INTRAVENOUS AS NEEDED
Status: DISCONTINUED | OUTPATIENT
Start: 2021-07-18 | End: 2021-07-18

## 2021-07-18 RX ORDER — MIDAZOLAM HYDROCHLORIDE 2 MG/2ML
INJECTION, SOLUTION INTRAMUSCULAR; INTRAVENOUS AS NEEDED
Status: DISCONTINUED | OUTPATIENT
Start: 2021-07-18 | End: 2021-07-18

## 2021-07-18 RX ORDER — ONDANSETRON 2 MG/ML
4 INJECTION INTRAMUSCULAR; INTRAVENOUS ONCE AS NEEDED
Status: DISCONTINUED | OUTPATIENT
Start: 2021-07-18 | End: 2021-07-18 | Stop reason: HOSPADM

## 2021-07-18 RX ORDER — TAMSULOSIN HYDROCHLORIDE 0.4 MG/1
0.4 CAPSULE ORAL
Refills: 0
Start: 2021-07-18 | End: 2021-07-18 | Stop reason: SDUPTHER

## 2021-07-18 RX ADMIN — LIDOCAINE HYDROCHLORIDE 50 MG: 10 INJECTION, SOLUTION EPIDURAL; INFILTRATION; INTRACAUDAL; PERINEURAL at 12:04

## 2021-07-18 RX ADMIN — FAMOTIDINE 20 MG: 20 TABLET ORAL at 09:01

## 2021-07-18 RX ADMIN — LEVOFLOXACIN: 5 INJECTION, SOLUTION INTRAVENOUS at 12:00

## 2021-07-18 RX ADMIN — FENTANYL CITRATE 50 MCG: 50 INJECTION, SOLUTION INTRAMUSCULAR; INTRAVENOUS at 12:04

## 2021-07-18 RX ADMIN — ONDANSETRON 4 MG: 2 INJECTION INTRAMUSCULAR; INTRAVENOUS at 12:04

## 2021-07-18 RX ADMIN — PROPOFOL 150 MG: 10 INJECTION, EMULSION INTRAVENOUS at 12:04

## 2021-07-18 RX ADMIN — AMLODIPINE BESYLATE 5 MG: 5 TABLET ORAL at 14:44

## 2021-07-18 RX ADMIN — MIDAZOLAM 2 MG: 1 INJECTION INTRAMUSCULAR; INTRAVENOUS at 12:00

## 2021-07-18 RX ADMIN — SODIUM CHLORIDE 125 ML/HR: 0.9 INJECTION, SOLUTION INTRAVENOUS at 05:58

## 2021-07-18 RX ADMIN — LISINOPRIL 20 MG: 20 TABLET ORAL at 06:09

## 2021-07-18 RX ADMIN — SODIUM CHLORIDE, SODIUM LACTATE, POTASSIUM CHLORIDE, AND CALCIUM CHLORIDE: .6; .31; .03; .02 INJECTION, SOLUTION INTRAVENOUS at 12:00

## 2021-07-18 RX ADMIN — LABETALOL 20 MG/4 ML (5 MG/ML) INTRAVENOUS SYRINGE 10 MG: at 09:01

## 2021-07-18 RX ADMIN — DEXAMETHASONE SODIUM PHOSPHATE 4 MG: 10 INJECTION, SOLUTION INTRAMUSCULAR; INTRAVENOUS at 12:04

## 2021-07-18 NOTE — OP NOTE
OPERATIVE REPORT  PATIENT NAME: Norman Hutchins    :  1950  MRN: [id number]  Pt Location: [facility] OR ROOM 04    SURGERY DATE: 2021    Surgeon(s) and Role:     * Jose M Montanez MD - Primary    Preop Diagnosis:  Ureterolithiasis [N20 1]  Left 6 mm stone    Post-Op Diagnosis Codes:     * Ureterolithiasis [N20 1]  Left 6 mm stone    Procedure(s) (LRB):  CYSTOSCOPY URETEROSCOPY WITH LITHOTRIPSY HOLMIUM LASER, RETROGRADE PYELOGRAM AND INSERTION STENT URETERAL (Left)    Specimen(s):  ID Type Source Tests Collected by Time Destination   1 : Left ureteral stone Calculus Ureter, Left STONE ANALYSIS, TISSUE EXAM Jose M Montanez MD 2021 1232        Estimated Blood Loss:   Minimal    Drains:  * No LDAs found *    Anesthesia Type:   General/LMA    Operative Indications:  Ureterolithiasis [N20 1]  This 42-year-old male known to me last seen years ago for renal stones presented to [facility] last  for severe left flank pain found to have 5 mm stone in the left proximal ureter patient was discharged home on tamsulosin and told to follow-up with urologist    Presenting back to [address] [place] [address] yesterday with severe pain undergoing repeat CT scan now to confirm stone in the left mid L2-3 ureter patient watched overnight still having pain wishes to undergo ureteroscopy stone extraction aware risk of anesthesia infection bleeding additional urologic procedures    Operative Findings:  6 mm stone left L3 ureter laser lithotripsy basket extraction stent placed    Complications:   None    Procedure and Technique:  After patient identified the holding area left side marked consent signed placed in the op suite  After anesthesia induced he was placed in lithotomy position draped prep standard fashion  Time-out performed    Twenty-two [place]orese cystoscope with 30 lens was passed through the anterior urethra noting a mild soft stricture in the bulbar urethra which was easily passed with the scope into the posterior urethra confirming a 2 5 cm bilobar obstructing prostatic urethra scope inserted to the bladder lumen a 0 038 wire was then passed upward into the renal pelvis left a safety    The semi rigid ureteral scope was then passed easily into the left orifice into the left mid ureter stone was encountered pictures taken laser lithotripsy of stones basket extraction sent for analysis cystoscope replaced over the wire 24 cm 6 [place]ic stent was passed wire removed scope removed postop procedure well was awakened taken recovery room stable condition   I was present for the entire procedure    Patient Disposition:  PACU     SIGNATURE: Oni Levin MD  DATE: July 18, 2021  TIME: 12:46 PM

## 2021-07-18 NOTE — NURSING NOTE
AVS reviewed with pt  Lithuanian and english AVS given and med info printed in [address]  IV removed   Pt left to go home, prescription given

## 2021-07-18 NOTE — DISCHARGE SUMMARY
Discharge Summary - [facility] Internal Medicine    Patient Information: Cristela Dominique 70 y o  male MRN: [id number]  Unit/Bed#: [address] [place] [address] Encounter: [id number]    Discharging Physician / Practitioner: Kirstin Barclay MD  PCP: Bertram Angeles MD  Admission Date: 7/17/2021  Discharge Date: 07/18/21    Reason for Admission: Hip Pain (Pain started 2 days ago on the left leg and going to the left hip to the left flank where he states he had a kidney stone removed )      Discharge Diagnoses:     Principal Problem:    Ureterolithiasis  Active Problems:    Hypertension    BPH (benign prostatic hyperplasia)    Abnormal abdominal CT scan  Resolved Problems:    * No resolved hospital problems  *        * Ureterolithiasis  Assessment & Plan  Presented with left-sided flank and hip pain since last Monday  Evaluated on 7/15 at [facility] for the same, noted to have 5 mm obstructing stone on the left  Discharge on conservative management but presented due to persistent pain  Repeat CT of the abdomen persistent 5 mm left ureteral stone causing mild hydronephrosis  Patient remained afebrile, normal white count  Renal function stable  Seen by Urology, input appreciated  Underwent cystoscopy, ureteroscopy with holmium laser lithotripsy, retrograde pyelogram insertion of the ureteral stent  Doing well postprocedure  Voiding without difficulty    Reports resolution of the pain  Urine culture negative  · Patient will be discharged home with follow-up with urology  · Educated about the follow-up plan    Hypertension  Assessment & Plan  Uncontrolled in setting of pain on presentation, improved with pain control but still remains above goal  Asymptomatic  · Continue lisinopril  · Amlodipine added  · Advised to follow-up with PCP for workup for adrenal adenoma      Abnormal abdominal CT scan  Assessment & Plan  Incidentally noted to have hepatic cysts and left adenoma  Patient and family is aware about the findings    BPH (benign prostatic hyperplasia)  Assessment & Plan  Recently started on tamsulosin during ER visit at [facility]  No mention of BPH done here but mention of mild prostatomegaly on CT report from [facility]  Patient does report some chronic urinary frequency   · Advised to discuss with Urology regarding further continuation of tamsulosin      Consultations During Hospital Stay:  [facility]    Procedures Performed:     · Procedure(s):  · CYSTOSCOPY URETEROSCOPY WITH LITHOTRIPSY HOLMIUM LASER, RETROGRADE PYELOGRAM AND INSERTION STENT URETERAL    Significant Findings:     · Refer to hospital course and above listed diagnosis related plan for details    Imaging while in hospital:    CT renal stone study abdomen pelvis without contrast    Result Date: 7/17/2021  Narrative: CT ABDOMEN AND PELVIS WITHOUT IV CONTRAST - LOW DOSE RENAL STONE INDICATION:   left flank pain, hx stones  COMPARISON:  CT 9/14/15 TECHNIQUE:  Low dose thin section CT examination of the abdomen and pelvis was performed without intravenous or oral contrast according to a protocol specifically designed to evaluate for urinary tract calculus  Axial, sagittal, and coronal 2D reformatted images were created from the source data and submitted for interpretation  Evaluation for pathology in the abdomen and pelvis that is unrelated to urinary tract calculi is limited  Radiation dose length product (DLP) for this visit:  362 mGy-cm   This examination, like all CT scans performed in the [facility], was performed utilizing techniques to minimize radiation dose exposure, including the use of iterative reconstruction and automated exposure control  FINDINGS: LOWER CHEST:  No clinically significant abnormality identified in the visualized lower chest  LIVER/BILIARY TREE:  7 4 and 3 2 cm left hepatic cysts, very mildly increased from prior    There are additional too small to characterize hypodensities within the liver GALLBLADDER:  No calcified gallstones  No pericholecystic inflammatory change  SPLEEN:  Unremarkable  PANCREAS:  Unremarkable  ADRENAL GLANDS:  3 6 x 2 6 left adrenal adenoma KIDNEYS/URETERS:  5 mm stone noted in the left mid ureter  There is very mild hydronephrosis STOMACH AND BOWEL:  There is colonic diverticulosis without evidence of acute diverticulitis  APPENDIX:  A normal appendix was visualized  ABDOMINOPELVIC CAVITY:  No ascites or free intraperitoneal air  No lymphadenopathy  VESSELS:  Unremarkable for patient's age  PELVIS REPRODUCTIVE ORGANS:  Unremarkable for patient's age  URINARY BLADDER:  Unremarkable  ABDOMINAL WALL/INGUINAL REGIONS:  Bilateral inguinal hernias OSSEOUS STRUCTURES:  No acute fracture or destructive osseous lesion  Impression: 5 mm left ureteral stone causes mild hydronephrosis The study was marked in EPIC for immediate notification  Workstation performed: [id number]       Incidental Findings:   · CT scan as above revealing left hepatic cyst and left adrenal adenoma  Patient is aware about this diagnosis from prior ER visit  Test Results Pending at Discharge (will require follow up):   · As per After Visit Summary     Outpatient Tests Requested:  · Advised Workup for adenoma as per PCP    Complications:  Refer to hospital course and above listed diagnosis related plan, if any    Hospital Course: As per HPI  Sona Cornelius is a 70 y o  male patient with history of nephrolithiasis, hypertension, BPH who originally presented to the hospital on 7/17/2021 due to left-sided flank pain started around last Monday  Patient reported that he experienced left hip pain initially but subsequently noted left flank pain similar to his prior kidney stone  Patient was evaluated at [facility] on 7/15 noted to have 5 mm obstructing stone on the left side, patient was discharged on conservative management    Patient was also educated about the incidental finding of hepatic cyst and left adrenal nodule  Over next 2 days patient pain persisted without any improvement and he presented to ED for further evaluation  Patient reported associated nausea, denies any fever, chills abdominal pain, gross hematuria, or other GI complaints  Patient did report chronic urinary frequency and mild dysuria  In ED, patient's blood pressure was significantly elevated  Other vital signs were stable  Labs were largely unremarkable  CT of the abdomen revealed persistent to 5 mm left ureteral stone causing mild hydronephrosis  Case was discussed with Urology and patient was subsequently admitted    Please see above list of diagnoses and related plan for additional information  Condition at Discharge: stable     Discharge Day Visit / Exam:     Subjective:  Underwent cystoscopy, ureteroscopy with lithotripsy holmium laser, retrograde pyelogram insertion of the left ureteral stent  Feels much better after procedure reports that his left flank and leg pain has resolved  Ambulating in the room  Voiding blood-tinged urine without any difficulties  Denies nausea vomiting, tolerating diet  Anxious to go home      Vitals: Blood Pressure: 160/100 (07/18/21 1528)  Pulse: 76 (07/18/21 1528)  Temperature: 98 1 °F (36 7 °C) (07/18/21 1528)  Temp Source: Oral (07/18/21 1528)  Respirations: 20 (07/18/21 1528)  Height: 5' 8" (172 7 cm) (07/17/21 1303)  Weight - Scale: 75 2 kg (165 lb 12 6 oz) (07/17/21 1228)  SpO2: 97 % (07/18/21 1528)  Exam:   Physical Exam  Constitutional:       General: He is not in acute distress  HENT:      Head: Normocephalic and atraumatic  Cardiovascular:      Rate and Rhythm: Normal rate  Pulmonary:      Effort: Pulmonary effort is normal  No respiratory distress  Breath sounds: No wheezing, rhonchi or rales  Chest:      Chest wall: No tenderness  Abdominal:      General: Bowel sounds are normal  There is no distension  Palpations: Abdomen is soft  Tenderness: There is no abdominal tenderness  There is no guarding or rebound  Musculoskeletal:      Right lower leg: No edema  Left lower leg: No edema  Skin:     General: Skin is warm and dry  Findings: No rash  Neurological:      General: No focal deficit present  Mental Status: He is alert and oriented to person, place, and time  Mental status is at baseline  Cranial Nerves: No cranial nerve deficit  Psychiatric:         Mood and Affect: Mood normal          Discharge instructions/Information to patient and family:(Discharge Medications and Follow up):   See after visit summary for information provided to patient and family  Provisions for Follow-Up Care:  See after visit summary for information related to follow-up care and any pertinent home health orders  Disposition: Home    Planned Readmission:  No     Discharge Statement:  I spent 45 minutes discharging the patient  This time was spent on the day of discharge  I had direct contact with the patient on the day of discharge  Greater than 50% of the total time was spent examining patient, answering all patient questions, arranging and discussing plan of care with patient as well as directly providing post-discharge instructions  Additional time then spent on discharge activities  Discussed with Dr Parth Cervantes at the time of discharge  Discussed with patient and her daughter at length  Discharge Medications:  See after visit summary for reconciled discharge medications provided to patient and family  ** Please Note: "This note has been constructed using a voice recognition system  Therefore there may be syntax, spelling, and/or grammatical errors   Please call if you have any questions  "**

## 2021-07-18 NOTE — DISCHARGE INSTRUCTIONS
Colocación de un stent uretral   LO QUE NECESITA SABER:   La colocación de un stent uretral, es un procedimiento para abrir elizabeth obstrucción o constricción (estrechamiento) en boggs uretra  La uretra es el conducto que transporta la orina de boggs vejiga, fuera de boggs cuerpo  Un stent es un tubo de plástico o metal pequeño usado para abrir boggs uretra estrechada  Un stent uretral podría permanecer por un plazo corto o lisbeth de [place]  INSTRUCCIONES SOBRE EL MAIRA HOSPITALARIA:   Medicamentos:   · Antibióticos:  Tyshawn medicamento se administra para ayudar a tratar o prevenir elizabeth infección causada por bacteria  · Analgésicos:  Es posible que le receten un medicamento para aliviar el dolor  No espere hasta que el dolor sea severo antes de yong tyshawn medicamento  · Blythe martha medicamentos billy se le haya indicado  Consulte con boggs médico si usted abdulaziz que boggs medicamento no le está ayudando o si presenta efectos secundarios  Infórmele si es alérgico a cualquier medicamento  Mantenga elizabeth lista actualizada de los [place], las vitaminas y los productos herbales que yasmeen  Incluya los siguientes datos de los medicamentos: cantidad, frecuencia y motivo de administración  Traiga con usted la lista o los envases de la píldoras a martha citas de seguimiento  Lleve la lista de los medicamentos con usted en priscilla de elizabeth emergencia  Programe elizabeth mady con boggs médico o urólogo billy se le indique:  Es posible que necesiten realizarle más exámenes o procedimientos  Anote martha preguntas para que se acuerde de hacerlas kevin martha visitas  Actividad:  Pregúntele a boggs médico cuándo usted puede regresar al Harrison Cassie o a la escuela, tener contacto sexual o realizar martha actividades habituales  Comuníquese con boggs médico o urólogo si:   · Siente dolor en la parte inferior de boggs abdomen  · Usted siente necesidad de [employer] de  habitual     · Tiene dolor en el área entre el ano y los genitales      · Siente dolor cuando tiene Mera erección  · Siente dolor al tener o después de tener contacto sexual     · Usted tiene preguntas o inquietudes acerca de parker condición o cuidado  Busque atención médica de inmediato o llame al 911 si:   · Usted tiene fiebre o escalofríos  · Nota glenda o secreción en parker orina  · Siente dolor severo entre martha costillas y [de-identified] o en la parte inferior de parker espalda  · Tiene dificultad para orinar o siente dolor al Jonh  © 2017 [address] [employer] Information is for End User's use only and may not be sold, redistributed or otherwise used for commercial purposes  All illustrations and images included in CareNotes® are the copyrighted property of A D A M , Inc  or Shag[employer]  Esta información es sólo para uso en educación  Parker intención no es darle un consejo médico sobre enfermedades o tratamientos  Colsulte con parker Dorna Vito farmacéutico antes de seguir cualquier régimen médico para saber si es seguro y efectivo para usted

## 2021-07-18 NOTE — PROGRESS NOTES
Progress Note - Urology      Patient: Arielle Fernandez   : 1950 Sex: male   MRN: [id number]     CSN: [id number]  Unit/Bed#: [address] [place] [address]     SUBJECTIVE:   Continue have pain      Objective   Vitals: BP (!) 184/100   Pulse 60   Temp 97 6 °F (36 4 °C)   Resp 18   Ht 5' 8" (1 727 m)   Wt 75 2 kg (165 lb 12 6 oz)   SpO2 97%   BMI 25 21 kg/m²     I/O last 24 hours: In: 3252 5 [P O :210;  I V :2 5; IV Piggyback:1000]  Out: 2950 [Urine:2950]      Physical Exam:   General Alert awake   Normocephalic atraumatic PERRLA  Lungs clear bilaterally  Cardiac normal S1 normal S2  Abdomen soft, flank pain  Extremities no edema      Lab Results: CBC:   Lab Results   Component Value Date    WBC 9 26 2021    HGB 16 1 2021    HCT 48 4 2021    MCV 97 2021     2021    MCH 32 4 2021    MCHC 33 3 2021    RDW 13 5 2021    MPV 9 0 2021    NRBC 0 2021     CMP:   Lab Results   Component Value Date     2021    CO2 25 2021    BUN 16 2021    CREATININE 0 86 2021    CALCIUM 8 2 (L) 2021    AST 25 2021    ALT 32 2021    ALKPHOS 45 (L) 2021    EGFR 87 2021     Urinalysis:   Lab Results   Component Value Date    COLORU Yellow 2021    CLARITYU Clear 2021    SPECGRAV >=1 030 2021    PHUR 5 5 2021    PHUR 6 0 2019    LEUKOCYTESUR Negative 2021    NITRITE Negative 2021    GLUCOSEU Negative 2021    KETONESU Negative 2021    BILIRUBINUR Negative 2021    BLOODU Trace-Intact (A) 2021     Urine Culture: No results found for: URINECX  PSA:   Lab Results   Component Value Date    PSA 1 4 2020    PSA 1 2 2019   Aware risk of anesthesia infection bleeding additional urologic procedures      Assessment/ Plan:  Left proximal stone  Patient continues to have pain wishes to undergo cysto stent possible ureteroscopy laser          Jordy Duncan, MD

## 2021-07-18 NOTE — PLAN OF CARE
Problem: PAIN - ADULT  Goal: Verbalizes/displays adequate comfort level or baseline comfort level  Description: Interventions:  - Encourage patient to monitor pain and request assistance  - Assess pain using appropriate pain scale  - Administer analgesics based on type and severity of pain and evaluate response  - Implement non-pharmacological measures as appropriate and evaluate response  - Consider cultural and social influences on pain and pain management  - Notify physician/advanced practitioner if interventions unsuccessful or patient reports new pain  Outcome: Adequate for Discharge     Problem: SAFETY ADULT  Goal: Maintain or return to baseline ADL function  Description: INTERVENTIONS:  -  Assess patient's ability to carry out ADLs; assess patient's baseline for ADL function and identify physical deficits which impact ability to perform ADLs (bathing, care of mouth/teeth, toileting, grooming, dressing, etc )  - Assess/evaluate cause of self-care deficits   - Assess range of motion  - Assess patient's mobility; develop plan if impaired  - Assess patient's need for assistive devices and provide as appropriate  - Encourage maximum independence but intervene and supervise when necessary  - Involve family in performance of ADLs  - Assess for home care needs following discharge   - Consider OT consult to assist with ADL evaluation and planning for discharge  - Provide patient education as appropriate  Outcome: Adequate for Discharge     Problem: Knowledge Deficit  Goal: Patient/family/caregiver demonstrates understanding of disease process, treatment plan, medications, and discharge instructions  Description: Complete learning assessment and assess knowledge base    Interventions:  - Provide teaching at level of understanding  - Provide teaching via preferred learning methods  Outcome: Adequate for Discharge

## 2021-07-18 NOTE — ANESTHESIA POSTPROCEDURE EVALUATION
Post-Op Assessment Note    CV Status:  Stable  Pain Score: 1    Pain management: adequate     Mental Status:  Awake   Hydration Status:  Stable   PONV Controlled:  None   Airway Patency:  Patent  Airway: intubated   Two or more mitigation strategies used for obstructive sleep apnea   Post Op Vitals Reviewed: Yes      Staff: Anesthesiologist         No complications documented      BP     Temp      Pulse     Resp      SpO2

## 2021-07-18 NOTE — ANESTHESIA PREPROCEDURE EVALUATION
Procedure:  CYSTOSCOPY URETEROSCOPY WITH LITHOTRIPSY HOLMIUM LASER, RETROGRADE PYELOGRAM AND INSERTION STENT URETERAL (Left Bladder)    Relevant Problems   CARDIO   (+) Hypertension      /RENAL   (+) BPH (benign prostatic hyperplasia)        Physical Exam    Airway    Mallampati score: II  TM Distance: >3 FB  Neck ROM: full     Dental       Cardiovascular  Rhythm: regular, Rate: normal,     Pulmonary  Breath sounds clear to auscultation,     Other Findings  Partial upper denture      Anesthesia Plan  ASA Score- 2 Emergent    Anesthesia Type- general with ASA Monitors  Additional Monitors:   Airway Plan: LMA  Plan Factors-Exercise tolerance (METS): >4 METS  Chart reviewed  Existing labs reviewed  Patient summary reviewed  Patient is not a current smoker  Induction- intravenous  Postoperative Plan- Plan for postoperative opioid use  Informed Consent- Anesthetic plan and risks discussed with patient  I personally reviewed this patient with the CRNA  Discussed and agreed on the Anesthesia Plan with the CRNA  Rojelio Agosto

## 2021-07-21 ENCOUNTER — OFFICE VISIT (OUTPATIENT)
Dept: FAMILY MEDICINE CLINIC | Facility: CLINIC | Age: 71
End: 2021-07-21
Payer: MEDICARE

## 2021-07-21 VITALS
HEIGHT: 67 IN | DIASTOLIC BLOOD PRESSURE: 100 MMHG | RESPIRATION RATE: 16 BRPM | SYSTOLIC BLOOD PRESSURE: 142 MMHG | TEMPERATURE: 99.2 F | HEART RATE: 106 BPM | WEIGHT: 160 LBS | BODY MASS INDEX: 25.11 KG/M2 | OXYGEN SATURATION: 96 %

## 2021-07-21 DIAGNOSIS — K76.89 LIVER CYST: ICD-10-CM

## 2021-07-21 DIAGNOSIS — Z12.11 COLON CANCER SCREENING: ICD-10-CM

## 2021-07-21 DIAGNOSIS — I10 ESSENTIAL HYPERTENSION: Primary | ICD-10-CM

## 2021-07-21 DIAGNOSIS — H61.21 IMPACTED CERUMEN OF RIGHT EAR: ICD-10-CM

## 2021-07-21 DIAGNOSIS — K57.30 COLON, DIVERTICULOSIS: ICD-10-CM

## 2021-07-21 DIAGNOSIS — D35.02 ADRENAL ADENOMA, LEFT: ICD-10-CM

## 2021-07-21 DIAGNOSIS — N40.1 BENIGN PROSTATIC HYPERPLASIA WITH LOWER URINARY TRACT SYMPTOMS, SYMPTOM DETAILS UNSPECIFIED: ICD-10-CM

## 2021-07-21 DIAGNOSIS — N20.1 URETEROLITHIASIS: ICD-10-CM

## 2021-07-21 DIAGNOSIS — E87.6 HYPOKALEMIA: ICD-10-CM

## 2021-07-21 PROBLEM — R93.5 ABNORMAL ABDOMINAL CT SCAN: Status: RESOLVED | Noted: 2021-07-17 | Resolved: 2021-07-21

## 2021-07-21 PROCEDURE — 99215 OFFICE O/P EST HI 40 MIN: CPT | Performed by: FAMILY MEDICINE

## 2021-07-21 PROCEDURE — 1124F ACP DISCUSS-NO DSCNMKR DOCD: CPT | Performed by: FAMILY MEDICINE

## 2021-07-21 RX ORDER — AMLODIPINE BESYLATE 10 MG/1
10 TABLET ORAL DAILY
Qty: 90 TABLET | Refills: 1 | Status: SHIPPED | OUTPATIENT
Start: 2021-07-21 | End: 2021-08-09 | Stop reason: SDUPTHER

## 2021-07-21 NOTE — PATIENT INSTRUCTIONS
We would like to control your blood pressure to keep it below 140/100 when you are resting  We can increase the amlodipine 5mg daily to 10mg daily and recheck you after 2 weeks  We may need to restart the lisinopril you used to be on and use it with the amlodipine in the future  For your low potassium level we can recheck it soon at the labs and see if you may need to take potassium supplements  You can do this in the next 2-3 days  For your adrenal gland adenoma, we would like for you to see a Endocrinologist to get hormone testing too see if the adenoma is functioning normally or abnormally  If it is functioning abnormally, it may need surgical removal     The liver cysts are ok and should not be causing any problems  Please try to get us your old records from Dr Moore     Please followup with Dr Cody Calix for your kidney stones and your prostate

## 2021-07-21 NOTE — PROGRESS NOTES
Assessment/Plan:    No problem-specific Assessment & Plan notes found for this encounter     htn not controlled, increase norvasc 5mg/d to 10mg/d, edema risk, f/u 2w  May need to back to ACEi in future    Kidney stones, he wants to stay with Dr Luciano Stock, f/u suggested, also for prostate    Left adrenal adenoma, new finding per pt on CT  Hypokalemia noted, will recheck bmp but refer to endocrinology for adrenal hyperfunction and possibly surgeon    bph by report, monitor    Diverticulosis on Ct, increase fiber intake/vegetables    Instructed on hydrogen peroxide for ear wax if effective    Colon screening suggested     Diagnoses and all orders for this visit:    Essential hypertension  -     amLODIPine (NORVASC) 10 mg tablet; Take 1 tablet (10 mg total) by mouth daily    Ureterolithiasis    Colon cancer screening  -     Ambulatory referral to Gastroenterology; Future    Benign prostatic hyperplasia with lower urinary tract symptoms, symptom details unspecified    Adrenal adenoma, left  -     Ambulatory referral to Endocrinology; Future    Hypokalemia  -     Basic metabolic panel; Future    Liver cyst    Colon, diverticulosis    Impacted cerumen of right ear        Return in about 2 weeks (around 8/4/2021) for Recheck  Subjective:      Patient ID: Gilbert Ashley is a 70 y o  male      Chief Complaint   Patient presents with    New Patient Visit     [address]       HPI  Has ureter stent by Dr Luciano Stock  Had kidney stones    Liver cyst on CT noted    Has htn  Was seeing Deandre    On amlodipine only    Eats healthy  New left adrenal adenoma  Tolerating norvasc, no swelling noted    Sometimes low potassium per chart review  Not on supplements    The following portions of the patient's history were reviewed and updated as appropriate: allergies, current medications, past family history, past medical history, past social history, past surgical history and problem list     Review of Systems Constitutional: Negative for chills and fever  HENT: Negative for sore throat and trouble swallowing  Respiratory: Negative for shortness of breath  Cardiovascular: Negative for chest pain and leg swelling  Gastrointestinal: Negative for abdominal distention and blood in stool  Genitourinary: Negative for difficulty urinating and dysuria  Neurological: Negative for dizziness and syncope  Psychiatric/Behavioral: Negative for dysphoric mood  The patient is not nervous/anxious  Current Outpatient Medications   Medication Sig Dispense Refill    amLODIPine (NORVASC) 10 mg tablet Take 1 tablet (10 mg total) by mouth daily 90 tablet 1     No current facility-administered medications for this visit  Objective:    /100   Pulse (!) 106   Temp 99 2 °F (37 3 °C)   Resp 16   Ht 5' 6 5" (1 689 m)   Wt 72 6 kg (160 lb)   SpO2 96%   BMI 25 44 kg/m²        Physical Exam  Vitals and nursing note reviewed  Constitutional:       General: He is not in acute distress  Appearance: He is well-developed  He is not ill-appearing  HENT:      Head: Normocephalic  Right Ear: Tympanic membrane, ear canal and external ear normal  There is impacted cerumen  Left Ear: Tympanic membrane, ear canal and external ear normal  There is no impacted cerumen  Eyes:      General: No scleral icterus  Conjunctiva/sclera: Conjunctivae normal    Neck:      Vascular: No carotid bruit  Cardiovascular:      Rate and Rhythm: Normal rate and regular rhythm  Heart sounds: No murmur heard  No gallop  Pulmonary:      Effort: Pulmonary effort is normal  No respiratory distress  Breath sounds: No wheezing or rales  Abdominal:      General: There is no distension  Palpations: Abdomen is soft  Tenderness: There is no abdominal tenderness  Musculoskeletal:         General: No deformity  Cervical back: Neck supple  No tenderness  Right lower leg: No edema        Left lower leg: No edema  Skin:     General: Skin is warm and dry  Neurological:      Mental Status: He is alert  Motor: No weakness  Gait: Gait normal    Psychiatric:         Mood and Affect: Mood normal          Behavior: Behavior normal          Thought Content: Thought content normal        BMI Counseling: Body mass index is 25 44 kg/m²  The BMI is above normal  Nutrition recommendations include decreasing portion sizes and moderation in carbohydrate intake  Exercise recommendations include exercising 3-5 times per week  No pharmacotherapy was ordered  41 minutes spent with patient with over 50% of the time spent counseling             Za Daniels DO

## 2021-07-27 LAB
CALCIUM OXALATE DIHYDRATE MFR STONE IR: 20 %
COLOR STONE: NORMAL
COM MFR STONE: 80 %
COMMENT-STONE3: NORMAL
COMPOSITION: NORMAL
LABORATORY COMMENT REPORT: NORMAL
PHOTO: NORMAL
SIZE STONE: NORMAL MM
SPEC SOURCE SUBJ: NORMAL
STONE ANALYSIS-IMP: NORMAL
WT STONE: 26 MG

## 2021-07-28 ENCOUNTER — APPOINTMENT (OUTPATIENT)
Dept: LAB | Facility: HOSPITAL | Age: 71
End: 2021-07-28
Attending: FAMILY MEDICINE
Payer: MEDICARE

## 2021-07-28 DIAGNOSIS — E87.6 HYPOKALEMIA: ICD-10-CM

## 2021-07-28 LAB
ANION GAP SERPL CALCULATED.3IONS-SCNC: 11 MMOL/L (ref 4–13)
BUN SERPL-MCNC: 25 MG/DL (ref 5–25)
CALCIUM SERPL-MCNC: 8.7 MG/DL (ref 8.3–10.1)
CHLORIDE SERPL-SCNC: 105 MMOL/L (ref 100–108)
CO2 SERPL-SCNC: 28 MMOL/L (ref 21–32)
CREAT SERPL-MCNC: 1.18 MG/DL (ref 0.6–1.3)
GFR SERPL CREATININE-BSD FRML MDRD: 62 ML/MIN/1.73SQ M
GLUCOSE P FAST SERPL-MCNC: 104 MG/DL (ref 65–99)
POTASSIUM SERPL-SCNC: 4.2 MMOL/L (ref 3.5–5.3)
SODIUM SERPL-SCNC: 144 MMOL/L (ref 136–145)

## 2021-07-28 PROCEDURE — 80048 BASIC METABOLIC PNL TOTAL CA: CPT

## 2021-07-28 PROCEDURE — 36415 COLL VENOUS BLD VENIPUNCTURE: CPT

## 2021-08-01 ENCOUNTER — NURSE TRIAGE (OUTPATIENT)
Dept: OTHER | Facility: OTHER | Age: 71
End: 2021-08-01

## 2021-08-05 ENCOUNTER — HOSPITAL ENCOUNTER (OUTPATIENT)
Dept: RADIOLOGY | Facility: HOSPITAL | Age: 71
Discharge: HOME | End: 2021-08-05
Attending: SPECIALIST
Payer: COMMERCIAL

## 2021-08-05 DIAGNOSIS — N20.0 RENAL CALCULUS: ICD-10-CM

## 2021-08-05 PROCEDURE — 76770 US EXAM ABDO BACK WALL COMP: CPT

## 2021-08-09 ENCOUNTER — OFFICE VISIT (OUTPATIENT)
Dept: FAMILY MEDICINE CLINIC | Facility: CLINIC | Age: 71
End: 2021-08-09
Payer: COMMERCIAL

## 2021-08-09 VITALS
BODY MASS INDEX: 25.27 KG/M2 | HEART RATE: 72 BPM | DIASTOLIC BLOOD PRESSURE: 80 MMHG | SYSTOLIC BLOOD PRESSURE: 130 MMHG | HEIGHT: 67 IN | RESPIRATION RATE: 16 BRPM | WEIGHT: 161 LBS | TEMPERATURE: 98.5 F

## 2021-08-09 DIAGNOSIS — H61.23 BILATERAL IMPACTED CERUMEN: ICD-10-CM

## 2021-08-09 DIAGNOSIS — N20.1 URETEROLITHIASIS: ICD-10-CM

## 2021-08-09 DIAGNOSIS — I10 ESSENTIAL HYPERTENSION: Primary | ICD-10-CM

## 2021-08-09 DIAGNOSIS — M25.572 CHRONIC PAIN OF LEFT ANKLE: ICD-10-CM

## 2021-08-09 DIAGNOSIS — G89.29 CHRONIC PAIN OF LEFT ANKLE: ICD-10-CM

## 2021-08-09 PROBLEM — E87.6 HYPOKALEMIA: Status: RESOLVED | Noted: 2021-07-21 | Resolved: 2021-08-09

## 2021-08-09 PROCEDURE — 99214 OFFICE O/P EST MOD 30 MIN: CPT | Performed by: FAMILY MEDICINE

## 2021-08-09 PROCEDURE — 69210 REMOVE IMPACTED EAR WAX UNI: CPT | Performed by: FAMILY MEDICINE

## 2021-08-09 RX ORDER — AMLODIPINE BESYLATE 10 MG/1
10 TABLET ORAL DAILY
Qty: 90 TABLET | Refills: 1 | Status: SHIPPED | OUTPATIENT
Start: 2021-08-09 | End: 2021-12-20 | Stop reason: SDUPTHER

## 2021-08-09 NOTE — PROGRESS NOTES
Assessment/Plan:    No problem-specific Assessment & Plan notes found for this encounter     htn stable, continue meds, f/u q6m  Left ankle pain with hx of orif, check XR  Kidney stones, stay hydrated, f/u urology  B/l cerumen flushed, tolerated well     Diagnoses and all orders for this visit:    Essential hypertension  -     amLODIPine (NORVASC) 10 mg tablet; Take 1 tablet (10 mg total) by mouth daily  -     Comprehensive metabolic panel; Future    Bilateral impacted cerumen  -     Ear cerumen removal    Ureterolithiasis    Chronic pain of left ankle        Return in about 6 months (around 2/9/2022)  Subjective:      Patient ID: Cristela Dominique is a 70 y o  male  Chief Complaint   Patient presents with    Follow-up     medication ac/lpn    Blood Pressure Check    Results       HPI  Notes left uncle pain  Med with walking  Maybe some swelling only left side not right  Tolerated 10mg norvasc  bp improved    Left ankle injury? Not sure  Little bit per pt, not much  No recent xr  Fracture with ORIF 5y ago    K normal from 3 3 to 4 2    Sees urologist for kidney stones  Drinks lots of water    Some exercise but not much due to ankle    The following portions of the patient's history were reviewed and updated as appropriate: allergies, current medications, past family history, past medical history, past social history, past surgical history and problem list     Review of Systems   Respiratory: Negative for shortness of breath  Neurological: Negative for dizziness  Current Outpatient Medications   Medication Sig Dispense Refill    amLODIPine (NORVASC) 10 mg tablet Take 1 tablet (10 mg total) by mouth daily 90 tablet 1     No current facility-administered medications for this visit  Objective:    /80   Pulse 72   Temp 98 5 °F (36 9 °C)   Resp 16   Ht 5' 6 5" (1 689 m)   Wt 73 kg (161 lb)   BMI 25 60 kg/m²        Physical Exam  Vitals and nursing note reviewed     Constitutional: General: He is not in acute distress  Appearance: He is well-developed  He is not ill-appearing  HENT:      Head: Normocephalic  Right Ear: There is impacted cerumen  Left Ear: There is impacted cerumen  Eyes:      General: No scleral icterus  Conjunctiva/sclera: Conjunctivae normal    Cardiovascular:      Rate and Rhythm: Normal rate and regular rhythm  Heart sounds: No murmur heard  Pulmonary:      Effort: Pulmonary effort is normal  No respiratory distress  Abdominal:      Palpations: Abdomen is soft  Tenderness: There is no abdominal tenderness  Musculoskeletal:         General: No deformity  Cervical back: Neck supple  Right lower leg: No edema  Left lower leg: No edema  Comments: Left ankle tenderness but not reproducible with rom   Skin:     General: Skin is warm and dry  Coloration: Skin is not pale  Neurological:      Mental Status: He is alert  Motor: No weakness  Gait: Gait normal    Psychiatric:         Mood and Affect: Mood normal          Behavior: Behavior normal          Thought Content: Thought content normal          Ear cerumen removal    Date/Time: 8/9/2021 1:15 PM  Performed by: Seema Oliveros DO  Authorized by: Seema Oliveros DO   Universal Protocol:  Consent: Verbal consent obtained  Risks and benefits: risks, benefits and alternatives were discussed  Consent given by: patient      Patient location:  Clinic  Procedure details:     Local anesthetic:  None    Location:  L ear and R ear    Procedure type: irrigation with instrumentation      Instrumentation: loop      Approach:  External  Post-procedure details:     Complication:  None    Hearing quality:  Improved    Patient tolerance of procedure:   Tolerated well, no immediate complications             Seema Oliveros DO

## 2021-08-12 DIAGNOSIS — G89.29 CHRONIC PAIN OF LEFT ANKLE: Primary | ICD-10-CM

## 2021-08-12 DIAGNOSIS — M25.572 CHRONIC PAIN OF LEFT ANKLE: Primary | ICD-10-CM

## 2021-08-13 ENCOUNTER — HOSPITAL ENCOUNTER (OUTPATIENT)
Dept: RADIOLOGY | Facility: HOSPITAL | Age: 71
Discharge: HOME | End: 2021-08-13
Attending: FAMILY MEDICINE
Payer: COMMERCIAL

## 2021-08-13 DIAGNOSIS — G89.29 CHRONIC PAIN OF LEFT ANKLE: ICD-10-CM

## 2021-08-13 DIAGNOSIS — M25.572 CHRONIC PAIN OF LEFT ANKLE: ICD-10-CM

## 2021-08-13 PROCEDURE — 73610 X-RAY EXAM OF ANKLE: CPT

## 2021-08-14 ENCOUNTER — TELEPHONE (OUTPATIENT)
Dept: FAMILY MEDICINE CLINIC | Facility: CLINIC | Age: 71
End: 2021-08-14

## 2021-08-15 NOTE — TELEPHONE ENCOUNTER
Has appt this Wednesday for AWV  Can we check the system to see when he is due? I thought he is not due until 2022

## 2021-08-18 ENCOUNTER — OFFICE VISIT (OUTPATIENT)
Dept: FAMILY MEDICINE CLINIC | Facility: CLINIC | Age: 71
End: 2021-08-18
Payer: COMMERCIAL

## 2021-08-18 ENCOUNTER — TELEPHONE (OUTPATIENT)
Dept: FAMILY MEDICINE CLINIC | Facility: CLINIC | Age: 71
End: 2021-08-18

## 2021-08-18 VITALS
HEIGHT: 67 IN | TEMPERATURE: 96.8 F | WEIGHT: 162.4 LBS | BODY MASS INDEX: 25.49 KG/M2 | HEART RATE: 72 BPM | RESPIRATION RATE: 18 BRPM | SYSTOLIC BLOOD PRESSURE: 132 MMHG | DIASTOLIC BLOOD PRESSURE: 80 MMHG

## 2021-08-18 DIAGNOSIS — Z00.00 MEDICARE ANNUAL WELLNESS VISIT, SUBSEQUENT: Primary | ICD-10-CM

## 2021-08-18 DIAGNOSIS — Z11.59 NEED FOR HEPATITIS C SCREENING TEST: ICD-10-CM

## 2021-08-18 DIAGNOSIS — Z13.89 SCREENING FOR CARDIOVASCULAR, RESPIRATORY, AND GENITOURINARY DISEASES: ICD-10-CM

## 2021-08-18 DIAGNOSIS — Z12.5 PROSTATE CANCER SCREENING: ICD-10-CM

## 2021-08-18 DIAGNOSIS — I10 ESSENTIAL HYPERTENSION: ICD-10-CM

## 2021-08-18 DIAGNOSIS — M25.572 CHRONIC PAIN OF LEFT ANKLE: ICD-10-CM

## 2021-08-18 DIAGNOSIS — R73.03 PREDIABETES: ICD-10-CM

## 2021-08-18 DIAGNOSIS — Z12.11 COLON CANCER SCREENING: ICD-10-CM

## 2021-08-18 DIAGNOSIS — Z23 IMMUNIZATION DUE: ICD-10-CM

## 2021-08-18 DIAGNOSIS — Z13.1 SCREENING FOR DIABETES MELLITUS (DM): ICD-10-CM

## 2021-08-18 DIAGNOSIS — G89.29 CHRONIC PAIN OF LEFT ANKLE: ICD-10-CM

## 2021-08-18 DIAGNOSIS — Z13.6 SCREENING FOR CARDIOVASCULAR, RESPIRATORY, AND GENITOURINARY DISEASES: ICD-10-CM

## 2021-08-18 DIAGNOSIS — Z87.891 HISTORY OF TOBACCO ABUSE: ICD-10-CM

## 2021-08-18 DIAGNOSIS — Z13.83 SCREENING FOR CARDIOVASCULAR, RESPIRATORY, AND GENITOURINARY DISEASES: ICD-10-CM

## 2021-08-18 PROBLEM — H61.23 BILATERAL IMPACTED CERUMEN: Status: RESOLVED | Noted: 2021-07-21 | Resolved: 2021-08-18

## 2021-08-18 PROCEDURE — G0009 ADMIN PNEUMOCOCCAL VACCINE: HCPCS

## 2021-08-18 PROCEDURE — G0439 PPPS, SUBSEQ VISIT: HCPCS | Performed by: FAMILY MEDICINE

## 2021-08-18 PROCEDURE — 90732 PPSV23 VACC 2 YRS+ SUBQ/IM: CPT

## 2021-08-18 NOTE — TELEPHONE ENCOUNTER
Delwyn Collet from [facility] ([facility]) Radiology Immediate findings on Xray    Forwarded call to Lorraine

## 2021-08-18 NOTE — PROGRESS NOTES
Assessment/Plan:    No problem-specific Assessment & Plan notes found for this encounter  awv today  bp stable on meds  Encourage diet for prediabetes  Await ankle xr, offer ortho eval due to recurrent pain  F/u q6m  Pneumovax today  q6m f/u  LDCT and colonoscopy suggested    Called pt and daughter  Left lateral malleolus avulsion fx  To see ortho     Diagnoses and all orders for this visit:    Medicare annual wellness visit, subsequent    Colon cancer screening  -     Ambulatory referral to Gastroenterology; Future    Essential hypertension    Chronic pain of left ankle  -     Ambulatory referral to Orthopedic Surgery; Future    Screening for cardiovascular, respiratory, and genitourinary diseases  -     Lipid Panel with Direct LDL reflex; Future    Screening for diabetes mellitus (DM)  -     Comprehensive metabolic panel; Future    Immunization due  -     PNEUMOCOCCAL POLYSACCHARIDE VACCINE 23-VALENT =>3YO SQ IM    Prediabetes  -     Hemoglobin A1C; Future    Need for hepatitis C screening test  -     Hepatitis C antibody; Future    History of tobacco abuse  -     CT lung screening program; Future    Prostate cancer screening  -     PSA, Total Screen; Future        Return in about 6 months (around 2/18/2022) for Recheck  Subjective:      Patient ID: Radha Nick is a 70 y o  male  Chief Complaint   Patient presents with    Medicare Wellness Visit     [email or web address]        HPI  Tolerates bp meds  Ongoing left ankle pain, ORIF in the past    The following portions of the patient's history were reviewed and updated as appropriate: allergies, current medications, past family history, past medical history, past social history, past surgical history and problem list     Review of Systems   Constitutional: Negative for fever  Respiratory: Negative for shortness of breath            Current Outpatient Medications   Medication Sig Dispense Refill    amLODIPine (NORVASC) 10 mg tablet Take 1 tablet (10 mg total) by mouth daily 90 tablet 1     No current facility-administered medications for this visit  Objective:    /80   Pulse 72   Temp (!) 96 8 °F (36 °C)   Resp 18   Ht 5' 6 5" (1 689 m)   Wt 73 7 kg (162 lb 6 4 oz)   BMI 25 82 kg/m²        Physical Exam  Vitals and nursing note reviewed  Constitutional:       Appearance: He is well-developed  HENT:      Head: Normocephalic  Eyes:      Conjunctiva/sclera: Conjunctivae normal    Cardiovascular:      Rate and Rhythm: Normal rate  Pulmonary:      Effort: Pulmonary effort is normal  No respiratory distress  Abdominal:      Palpations: Abdomen is soft  Musculoskeletal:         General: No deformity  Cervical back: Neck supple  Skin:     General: Skin is warm and dry  Comments: No left ankle swelling or redness   Neurological:      Mental Status: He is alert  Psychiatric:         Behavior: Behavior normal          Thought Content:  Thought content normal                  Mukesh Perez DO

## 2021-08-18 NOTE — TELEPHONE ENCOUNTER
Dr Jaye Benson radiology called about patients xray for immediate findings  Please review  Thanks     Kelle Madrigal MA

## 2021-08-19 ENCOUNTER — TELEPHONE (OUTPATIENT)
Dept: GASTROENTEROLOGY | Facility: CLINIC | Age: 71
End: 2021-08-19

## 2021-08-19 NOTE — PATIENT INSTRUCTIONS

## 2021-08-19 NOTE — PROGRESS NOTES
Assessment and Plan:     Problem List Items Addressed This Visit        Active Problems    Hypertension    Colon cancer screening    Relevant Orders    Ambulatory referral to Gastroenterology    Chronic pain of left ankle    Relevant Orders    Ambulatory referral to Orthopedic Surgery    Medicare annual wellness visit, subsequent - Primary    Screening for diabetes mellitus (DM)    Relevant Orders    Comprehensive metabolic panel    Screening for cardiovascular, respiratory, and genitourinary diseases    Relevant Orders    Lipid Panel with Direct LDL reflex    Immunization due    Relevant Orders    PNEUMOCOCCAL POLYSACCHARIDE VACCINE 23-VALENT =>1YO SQ IM (Completed)    Prediabetes    Relevant Orders    Hemoglobin A1C    Need for hepatitis C screening test    Relevant Orders    Hepatitis C antibody    History of tobacco abuse    Relevant Orders    CT lung screening program    Prostate cancer screening    Relevant Orders    PSA, Total Screen           Preventive health issues were discussed with patient, and age appropriate screening tests were ordered as noted in patient's After Visit Summary  Personalized health advice and appropriate referrals for health education or preventive services given if needed, as noted in patient's After Visit Summary       History of Present Illness:     Patient presents for Medicare Annual Wellness visit    Patient Care Team:  Gonsalo Solitario DO as PCP - General (Family Medicine)  Gonsalo Solitario DO as PCP - [address] [place], [place] [address]Th [facility] ([facility])     Problem List:     Patient Active Problem List   Diagnosis    Ureterolithiasis    BPH (benign prostatic hyperplasia)    Hypertension    Colon cancer screening    Adrenal adenoma, left    Liver cyst    Colon, diverticulosis    Chronic pain of left ankle    Medicare annual wellness visit, subsequent    Screening for diabetes mellitus (DM)    Screening for cardiovascular, respiratory, and genitourinary diseases    Immunization due    Prediabetes    Need for hepatitis C screening test    History of tobacco abuse    Prostate cancer screening      Past Medical and Surgical History:     Past Medical History:   Diagnosis Date    BPH (benign prostatic hyperplasia)     Hypertension     Kidney calculi     left     Past Surgical History:   Procedure Laterality Date    EXTRACORPOREAL SHOCK WAVE LITHOTRIPSY      FRACTURE SURGERY Right     ankle with hardware    RI CYSTO/URETERO W/LITHOTRIPSY &INDWELL STENT INSRT Left 2021    Procedure: CYSTOSCOPY URETEROSCOPY WITH LITHOTRIPSY HOLMIUM LASER, RETROGRADE PYELOGRAM AND INSERTION STENT URETERAL;  Surgeon: Yamileth Savage MD;  Location: [address] [place] [address];  Service: Urology    TRANSURETHRAL RESECTION OF PROSTATE        Family History:     History reviewed  No pertinent family history  Social History:     Social History     Socioeconomic History    Marital status:      Spouse name: None    Number of children: None    Years of education: None    Highest education level: None   Occupational History    None   Tobacco Use    Smoking status: Former Smoker     Quit date:      Years since quittin 6    Smokeless tobacco: Never Used   Substance and Sexual Activity    Alcohol use: Yes     Comment: occ    Drug use: No    Sexual activity: None   Other Topics Concern    None   Social History Narrative    None     Social Determinants of Health     Financial Resource Strain:     Difficulty of Paying Living Expenses:    Food Insecurity:     Worried About Running Out of Food in the Last Year:     Ran Out of Food in the Last Year:    Transportation Needs:     Lack of Transportation (Medical):      Lack of Transportation (Non-Medical):    Physical Activity:     Days of Exercise per Week:     Minutes of Exercise per Session:    Stress:     Feeling of Stress :    Social Connections:     Frequency of Communication with Friends and Family:     Frequency of Social Gatherings with Friends and Family:     Attends [employer] Services:     Active Member of Clubs or Organizations:     Attends Club or Organization Meetings:     Marital Status:    Intimate Partner Violence:     Fear of Current or Ex-Partner:     Emotionally Abused:     Physically Abused:     Sexually Abused:       Medications and Allergies:     Current Outpatient Medications   Medication Sig Dispense Refill    amLODIPine (NORVASC) 10 mg tablet Take 1 tablet (10 mg total) by mouth daily 90 tablet 1     No current facility-administered medications for this visit  No Known Allergies   Immunizations:     Immunization History   Administered Date(s) Administered    Influenza Split High Dose Preservative Free IM 09/23/2019    Pneumococcal Conjugate 13-Valent 09/23/2019    Pneumococcal Polysaccharide PPV23 08/18/2021    SARS-CoV-2 / COVID-19 mRNA IM (Pfizer-BioNTech) 03/04/2021, 03/25/2021      Health Maintenance:         Topic Date Due    Hepatitis C Screening  Never done    Colorectal Cancer Screening  Never done         Topic Date Due    Influenza Vaccine (1) 09/01/2021      Medicare Health Risk Assessment:     /80   Pulse 72   Temp (!) 96 8 °F (36 °C)   Resp 18   Ht 5' 6 5" (1 689 m)   Wt 73 7 kg (162 lb 6 4 oz)   BMI 25 82 kg/m²      Claudette Chatters is here for his Subsequent Wellness visit  Last Medicare Wellness visit information reviewed, patient interviewed and updates made to the record today  Health Risk Assessment:   Patient rates overall health as good  Patient feels that their physical health rating is same  Patient is very satisfied with their life  Eyesight was rated as slightly worse  Hearing was rated as slightly worse  Patient feels that their emotional and mental health rating is same  Patients states they are sometimes angry  Patient states they are sometimes unusually tired/fatigued  Pain experienced in the last 7 days has been some  Patient's pain rating has been 4/10   Patient states that he has experienced no weight loss or gain in last 6 months  Depression Screening:   PHQ-2 Score: 0      Fall Risk Screening: In the past year, patient has experienced: no history of falling in past year      Home Safety:  Patient does not have trouble with stairs inside or outside of their home  Patient has working smoke alarms and has working carbon monoxide detector  Home safety hazards include: none  Nutrition:   Current diet is Diabetic, No Added Salt and Limited junk food  Medications:   Patient is not currently taking any over-the-counter supplements  Patient is able to manage medications  Activities of Daily Living (ADLs)/Instrumental Activities of Daily Living (IADLs):   Walk and transfer into and out of bed and chair?: Yes  Dress and groom yourself?: Yes    Bathe or shower yourself?: Yes    Feed yourself?  Yes  Do your laundry/housekeeping?: Yes  Manage your money, pay your bills and track your expenses?: Yes  Make your own meals?: Yes    Do your own shopping?: Yes    Previous Hospitalizations:   Any hospitalizations or ED visits within the last 12 months?: Yes    How many hospitalizations have you had in the last year?: 1-2    Hospitalization Comments: Kidney surgery    Advance Care Planning:   Living will: No    Durable POA for healthcare: No    Advanced directive: No    End of Life Decisions reviewed with patient: No      Cognitive Screening:   Provider or family/friend/caregiver concerned regarding cognition?: No    PREVENTIVE SCREENINGS      Cardiovascular Screening:    General: Screening Not Indicated and History Lipid Disorder      Diabetes Screening:     General: Screening Current      Colorectal Cancer Screening:     General: Risks and Benefits Discussed      Prostate Cancer Screening:    General: Screening Current      Osteoporosis Screening:    General: Screening Not Indicated      Abdominal Aortic Aneurysm (AAA) Screening:    Risk factors include: age between 73-69 yo and tobacco use General: Screening Not Indicated      Lung Cancer Screening:     General: Screening Not Indicated      Hepatitis C Screening:    General: Screening Not Indicated    Screening, Brief Intervention, and Referral to Treatment (SBIRT)    Screening  Typical number of drinks in a day: 0  Typical number of drinks in a week: 0  Interpretation: Low risk drinking behavior  AUDIT-C Screenin) How often did you have a drink containing alcohol in the past year? monthly or less  2) How many drinks did you have on a typical day when you were drinking in the past year? 1 to 2  3) How often did you have 6 or more drinks on one occasion in the past year? never    AUDIT-C Score: 1  Interpretation: Score 0-3 (male): Negative screen for alcohol misuse    Single Item Drug Screening:  How often have you used an illegal drug (including marijuana) or a prescription medication for non-medical reasons in the past year? never    Single Item Drug Screen Score: 0  Interpretation: Negative screen for possible drug use disorder    Other Counseling Topics:   Car/seat belt/driving safety and regular weightbearing exercise         Adrian Smith, DO

## 2021-08-19 NOTE — TELEPHONE ENCOUNTER
08/19/21  Screened by: Twin Raines    Referring Provider Dr Jabari Rivera: There is no height or weight on file to calculate BMI  Has patient been referred for a routine screening Colonoscopy? yes  Is the patient between 39-70 years old? yes      Previous Colonoscopy yes   If yes:    Date: 2015     Facility: [facility]    Reason: screening      SCHEDULING STAFF: If the patient is between 45yrs-49yrs, please advise patient to confirm benefits/coverage with their insurance company for a routine screening colonoscopy, some insurance carriers will only cover at [phone] or older  If the patient is over 66years old, please schedule an office visit  Does the patient want to see a Gastroenterologist prior to their procedure OR are they having any GI symptoms? no    Has the patient been hospitalized or had abdominal surgery in the past 6 months? yes    Does the patient use supplemental oxygen? no    Does the patient take Coumadin, Lovenox, Plavix, Elliquis, Xarelto, or other blood thinning medication? no    Has the patient had a stroke, cardiac event, or stent placed in the past year? no    SCHEDULING STAFF: If patient answers NO to above questions, then schedule procedure  If patient answers YES to above questions, then schedule office appointment  If patient is between 45yrs - 49yrs, please advise patient that we will have to confirm benefits & coverage with their insurance company for a routine screening colonoscopy

## 2021-08-20 ENCOUNTER — TELEPHONE (OUTPATIENT)
Dept: OBGYN CLINIC | Facility: CLINIC | Age: 71
End: 2021-08-20

## 2021-08-20 NOTE — TELEPHONE ENCOUNTER
EDDIE reviewed xray  Patient has hx of ORIF, xray report read Displaced posterior lateral malleolus cortical avulsion fracture age indeterminate  Patient needs to see surgeon  Will contact daughter

## 2021-08-23 ENCOUNTER — OFFICE VISIT (OUTPATIENT)
Dept: OBGYN CLINIC | Facility: CLINIC | Age: 71
End: 2021-08-23
Payer: COMMERCIAL

## 2021-08-23 VITALS
WEIGHT: 161.2 LBS | BODY MASS INDEX: 25.3 KG/M2 | HEART RATE: 81 BPM | SYSTOLIC BLOOD PRESSURE: 132 MMHG | DIASTOLIC BLOOD PRESSURE: 93 MMHG | HEIGHT: 67 IN

## 2021-08-23 DIAGNOSIS — M19.072 ARTHRITIS OF LEFT ANKLE: Primary | ICD-10-CM

## 2021-08-23 DIAGNOSIS — M25.572 CHRONIC PAIN OF LEFT ANKLE: ICD-10-CM

## 2021-08-23 DIAGNOSIS — G89.29 CHRONIC PAIN OF LEFT ANKLE: ICD-10-CM

## 2021-08-23 PROCEDURE — 99203 OFFICE O/P NEW LOW 30 MIN: CPT | Performed by: ORTHOPAEDIC SURGERY

## 2021-08-23 NOTE — PROGRESS NOTES
referred him for imaging  Marcie Cerda reports and brings documentation that the xray results from his left ankle xray taken on 21  Review of Systems   Constitutional: Negative for chills and fever  HENT: Negative for ear pain and sore throat  Eyes: Negative for pain and visual disturbance  Respiratory: Negative for cough and shortness of breath  Cardiovascular: Negative for chest pain and palpitations  Gastrointestinal: Negative for abdominal pain and vomiting  Genitourinary: Negative for dysuria and hematuria  Musculoskeletal: Negative for arthralgias and back pain  Skin: Negative for color change and rash  Neurological: Negative for seizures and syncope  All other systems reviewed and are negative  Past Medical History:   Diagnosis Date    BPH (benign prostatic hyperplasia)     Hypertension     Kidney calculi     left       Past Surgical History:   Procedure Laterality Date    EXTRACORPOREAL SHOCK WAVE LITHOTRIPSY      FRACTURE SURGERY Right     ankle with hardware    IL CYSTO/URETERO W/LITHOTRIPSY &INDWELL STENT INSRT Left 2021    Procedure: CYSTOSCOPY URETEROSCOPY WITH LITHOTRIPSY HOLMIUM LASER, RETROGRADE PYELOGRAM AND INSERTION STENT URETERAL;  Surgeon: Nabila Pop MD;  Location: [address] [place] [address];  Service: Urology    TRANSURETHRAL RESECTION OF PROSTATE         History reviewed  No pertinent family history      Social History     Occupational History    Not on file   Tobacco Use    Smoking status: Former Smoker     Quit date:      Years since quittin 6    Smokeless tobacco: Never Used   Substance and Sexual Activity    Alcohol use: Yes     Comment: occ    Drug use: No    Sexual activity: Not on file         Current Outpatient Medications:     amLODIPine (NORVASC) 10 mg tablet, Take 1 tablet (10 mg total) by mouth daily, Disp: 90 tablet, Rfl: 1    No Known Allergies    Objective:  Vitals:    21 0937   BP: 132/93   Pulse: 81       Left Ankle Exam Tenderness   The patient is experiencing tenderness in the lateral malleolus  Range of Motion   The patient has normal left ankle ROM  Muscle Strength   Dorsiflexion:  5/5   Plantar flexion:  5/5     Other   Erythema: absent  Sensation: normal  Pulse: present          Strength/Myotome Testing     Left Ankle/Foot   Dorsiflexion: 5  Plantar flexion: 5      Physical Exam  HENT:      Head: Normocephalic and atraumatic  Eyes:      General:         Right eye: No discharge  Left eye: No discharge  Extraocular Movements: Extraocular movements intact  Conjunctiva/sclera: Conjunctivae normal    Cardiovascular:      Rate and Rhythm: Normal rate  Pulmonary:      Effort: Pulmonary effort is normal  No respiratory distress  Musculoskeletal:         General: Tenderness present  Normal range of motion  Cervical back: Normal range of motion and neck supple  Skin:     General: Skin is warm and dry  Neurological:      Mental Status: He is alert and oriented to person, place, and time  Psychiatric:         Mood and Affect: Mood normal          Behavior: Behavior normal          I have personally reviewed pertinent films in PACS and my interpretation is as follows:  Review of the xrays of the left ankle taken on 8/13/21 demonstrates left ankle post-traumatic osteoarthritis  I do not appreciate any new fracture of the left lateral malleolus

## 2021-09-01 ENCOUNTER — HOSPITAL ENCOUNTER (OUTPATIENT)
Dept: RADIOLOGY | Facility: HOSPITAL | Age: 71
Discharge: HOME | End: 2021-09-01
Attending: FAMILY MEDICINE
Payer: COMMERCIAL

## 2021-09-01 DIAGNOSIS — Z87.891 HISTORY OF TOBACCO ABUSE: ICD-10-CM

## 2021-09-01 PROCEDURE — 71271 CT THORAX LUNG CANCER SCR C-: CPT

## 2021-09-06 DIAGNOSIS — E04.1 RIGHT THYROID NODULE: Primary | ICD-10-CM

## 2021-09-20 ENCOUNTER — HOSPITAL ENCOUNTER (OUTPATIENT)
Dept: RADIOLOGY | Facility: HOSPITAL | Age: 71
Discharge: HOME | End: 2021-09-20
Attending: FAMILY MEDICINE
Payer: COMMERCIAL

## 2021-09-20 DIAGNOSIS — E04.1 RIGHT THYROID NODULE: ICD-10-CM

## 2021-09-20 PROCEDURE — 76536 US EXAM OF HEAD AND NECK: CPT

## 2021-09-23 PROBLEM — E04.2 MULTIPLE THYROID NODULES: Status: ACTIVE | Noted: 2021-09-23

## 2021-09-24 DIAGNOSIS — E04.1 RIGHT THYROID NODULE: Primary | ICD-10-CM

## 2021-09-29 ENCOUNTER — OFFICE VISIT (OUTPATIENT)
Dept: GASTROENTEROLOGY | Facility: CLINIC | Age: 71
End: 2021-09-29
Payer: COMMERCIAL

## 2021-09-29 VITALS
WEIGHT: 160.4 LBS | HEIGHT: 66 IN | BODY MASS INDEX: 25.78 KG/M2 | DIASTOLIC BLOOD PRESSURE: 79 MMHG | SYSTOLIC BLOOD PRESSURE: 122 MMHG | HEART RATE: 71 BPM

## 2021-09-29 DIAGNOSIS — Z86.010 HISTORY OF COLON POLYPS: Primary | ICD-10-CM

## 2021-09-29 PROCEDURE — 99203 OFFICE O/P NEW LOW 30 MIN: CPT | Performed by: INTERNAL MEDICINE

## 2021-09-29 NOTE — PROGRESS NOTES
[facility] - Outpatient Consultation  Gabriel Denis 70 y o  male MRN: [id number]  Encounter: [id number]        ASSESSMENT AND PLAN:      1  History of colon polyps    Will plan colonoscopy at his convenience  - Colonoscopy; Future  - PAT Covid Screening; Future    ______________________________________________________________________    HPI:   Patient history of prior polyps on colonoscopy in   Has had no symptoms, namely no abdominal pain, change in bowel habit, unexplained weight loss, blood in stool  No family history of gastrointestinal disease  Has had some problems with kidney stones  REVIEW OF SYSTEMS:    Review of Systems   All other systems reviewed and are negative  Historical Information   Past Medical History:   Diagnosis Date    BPH (benign prostatic hyperplasia)     Hypertension     Kidney calculi     left     Past Surgical History:   Procedure Laterality Date    EXTRACORPOREAL SHOCK WAVE LITHOTRIPSY      FRACTURE SURGERY Right     ankle with hardware    RI CYSTO/URETERO W/LITHOTRIPSY &INDWELL STENT INSRT Left 2021    Procedure: CYSTOSCOPY URETEROSCOPY WITH LITHOTRIPSY HOLMIUM LASER, RETROGRADE PYELOGRAM AND INSERTION STENT URETERAL;  Surgeon: Darren Isidro MD;  Location: [address] [place] [address];  Service: Urology    TRANSURETHRAL RESECTION OF PROSTATE       Social History   Social History     Substance and Sexual Activity   Alcohol Use Yes    Comment: occ     Social History     Substance and Sexual Activity   Drug Use No     Social History     Tobacco Use   Smoking Status Former Smoker    Quit date:     Years since quittin 7   Smokeless Tobacco Never Used     History reviewed  No pertinent family history  Meds/Allergies       Current Outpatient Medications:     amLODIPine (NORVASC) 10 mg tablet    No Known Allergies        Objective     Blood pressure 122/79, pulse 71, height 5' 6" (1 676 m), weight 72 8 kg (160 lb 6 4 oz)   Body mass index is 25 89 kg/m²  PHYSICAL EXAM:      Physical Exam  Vitals and nursing note reviewed  Constitutional:       General: He is not in acute distress  HENT:      Head: Normocephalic and atraumatic  Eyes:      General: No scleral icterus  Pupils: Pupils are equal, round, and reactive to light  Cardiovascular:      Rate and Rhythm: Normal rate and regular rhythm  Pulmonary:      Effort: Pulmonary effort is normal  No respiratory distress  Abdominal:      General: There is no distension  Palpations: Abdomen is soft  Tenderness: There is no abdominal tenderness  There is no guarding or rebound  Hernia: A hernia is present  Comments: Small, easily reducible umbilical hernia   Musculoskeletal:         General: Normal range of motion  Cervical back: Normal range of motion and neck supple  Skin:     General: Skin is warm and dry  Neurological:      General: No focal deficit present  Mental Status: He is alert and oriented to person, place, and time  Psychiatric:         Mood and Affect: Mood normal          Behavior: Behavior normal               Lab Results:   No visits with results within 1 Day(s) from this visit  Latest known visit with results is:   Appointment on 07/28/2021   Component Date Value    Sodium 07/28/2021 144     Potassium 07/28/2021 4 2     Chloride 07/28/2021 105     CO2 07/28/2021 28     ANION GAP 07/28/2021 11     BUN 07/28/2021 25     Creatinine 07/28/2021 1 18     Glucose, Fasting 07/28/2021 104*    Calcium 07/28/2021 8 7     eGFR 07/28/2021 62          Radiology Results:   US thyroid    Result Date: 9/23/2021  Narrative: THYROID ULTRASOUND INDICATION:  E04 1: Nontoxic single thyroid nodule  COMPARISON:  CT chest 9/1/2021 TECHNIQUE:  Ultrasound of the thyroid was performed with a high frequency linear transducer in transverse and sagittal planes including volumetric imaging sweeps as well as traditional still imaging technique  FINDINGS: Thyroid texture:  Normal homogeneous smooth echotexture  Thyroid size: RIGHT lobe:  5 1 x 2 7 x 2 2 cm LEFT lobe:  4 5 x 1 5 x 1 5 cm Isthmus:  0 2 cm Nodule #1  Image 12  RIGHT midgland nodule measuring 1 7 x 1 6 x 1 5 cm  No priors for comparison  COMPOSITION:  2 points, solid or almost completely solid  ECHOGENICITY:  2 points, hypoechoic  SHAPE:  0 points, wider-than-tall  MARGIN:  0 points, smooth  ECHOGENIC FOCI:  0 points, none or large comet-tail artifacts  TI-RADS Classification:  TR 4 (4-6 points), FNA if  >=  1 5 cm  Follow if  >=  1 cm  Nodule #2  Image 38  LEFT lower pole nodule measuring 1 2 x 0 7 x 1 1 cm  No priors for comparison  COMPOSITION:  2 points, solid or almost completely solid  ECHOGENICITY:  1 point, hyperechoic or isoechoic  SHAPE:  0 points, wider-than-tall  MARGIN:  0 points, smooth  ECHOGENIC FOCI:  0 points, none or large comet-tail artifacts  TI-RADS Classification:  TR 3 (3 points), FNA if  >=  2 5 cm  Follow if  >=  1 5 cm  There are additional nodule(s) of lesser size and/or TI-RADS score which do not necessitate additional evaluation based on ACR criteria  Impression: 1  The following thyroid nodule meets current ACR TI-RADS criteria for recommending ultrasound-guided fine-needle aspiration: RIGHT midgland nodule measuring 1 7 x 1 6 x 1 5 cm  Reference: ACR Thyroid Imaging, Reporting and Data System (TI-RADS): White Paper of the [employer]  J AM Archie Radiol 2068;57:295-312  Additional recommendations based on American Thyroid Association 2015 guidelines  The study was marked in EPIC for significant notification  Workstation performed: [id number]     CT lung screening program    Result Date: 9/6/2021  Narrative: CT CHEST LUNG CANCER SCREENING WITHOUT IV CONTRAST INDICATION:   Z87 [id number]: Personal history of nicotine dependence  COMPARISON: None  TECHNIQUE:  Unenhanced CT examination of the chest was performed utilizing a low dose protocol  Reformatted images were created in axial, sagittal, and coronal planes  Radiation dose length product (DLP) for this visit:  113 82 mGy-cm   This examination, like all CT scans performed in the [place], was performed utilizing techniques to minimize radiation dose exposure, including the use of iterative  reconstruction and automated exposure control  FINDINGS: LUNGS:  Central airways are patent  There are a couple of calcified granulomata in the left lung  Images are degraded by respiratory motion artifact  Bibasilar subsegmental atelectasis  No suspicious nodules or masses  PLEURA:  Unremarkable  HEART/GREAT VESSELS:  Unremarkable for patient's age  MEDIASTINUM AND TAJ:  Unremarkable  CHEST WALL AND LOWER NECK: There is a 2 0 x 1 3 cm nodule in the posterior right thyroid  Incidental discovery of one or more thyroid nodule(s) measuring more than 1 5 cm and without suspicious features is noted in this patient who is above 28years old; according to guidelines published in the February 2015 white paper on incidental thyroid nodules in the Journal of the [employer] of Radiology Zan Teague), further characterization with thyroid ultrasound is recommended  VISUALIZED STRUCTURES IN THE UPPER ABDOMEN:  Again seen is a 3 3 x 2 7 cm left adrenal adenoma  There are multiple cysts in the liver including a 7 6 cm cyst in hepatic segment 4A  OSSEOUS STRUCTURES:  No acute fracture or destructive osseous lesion  Impression: 1  No nodules and/or definitely benign nodules  2   Right thyroid nodule measuring 2 0 x 1 3 cm  Recommend further characterization with thyroid ultrasound  Lung-RADS1, negative  Continued annual screening with LDCT in 12 months   Workstation performed: [id number]

## 2021-10-13 ENCOUNTER — TELEPHONE (OUTPATIENT)
Dept: OTHER | Facility: HOSPITAL | Age: 71
End: 2021-10-13

## 2021-10-13 ENCOUNTER — CONSULT (OUTPATIENT)
Dept: ENDOCRINOLOGY | Facility: CLINIC | Age: 71
End: 2021-10-13
Payer: COMMERCIAL

## 2021-10-13 VITALS
TEMPERATURE: 97.2 F | HEART RATE: 72 BPM | WEIGHT: 164 LBS | HEIGHT: 66 IN | BODY MASS INDEX: 26.36 KG/M2 | DIASTOLIC BLOOD PRESSURE: 82 MMHG | SYSTOLIC BLOOD PRESSURE: 110 MMHG

## 2021-10-13 DIAGNOSIS — E87.6 HYPOKALEMIA: ICD-10-CM

## 2021-10-13 DIAGNOSIS — D35.02 ADRENAL ADENOMA, LEFT: Primary | ICD-10-CM

## 2021-10-13 DIAGNOSIS — E04.2 MULTIPLE THYROID NODULES: ICD-10-CM

## 2021-10-13 DIAGNOSIS — I10 HYPERTENSION, UNSPECIFIED TYPE: ICD-10-CM

## 2021-10-13 PROCEDURE — 99205 OFFICE O/P NEW HI 60 MIN: CPT | Performed by: INTERNAL MEDICINE

## 2021-10-14 ENCOUNTER — APPOINTMENT (OUTPATIENT)
Dept: LAB | Facility: HOSPITAL | Age: 71
End: 2021-10-14
Payer: COMMERCIAL

## 2021-10-14 DIAGNOSIS — D35.02 ADRENAL ADENOMA, LEFT: ICD-10-CM

## 2021-10-14 DIAGNOSIS — I10 HYPERTENSION, UNSPECIFIED TYPE: ICD-10-CM

## 2021-10-14 LAB
ANION GAP SERPL CALCULATED.3IONS-SCNC: 12 MMOL/L (ref 4–13)
BUN SERPL-MCNC: 21 MG/DL (ref 5–25)
CALCIUM SERPL-MCNC: 9.3 MG/DL (ref 8.3–10.1)
CHLORIDE SERPL-SCNC: 105 MMOL/L (ref 100–108)
CO2 SERPL-SCNC: 27 MMOL/L (ref 21–32)
CREAT SERPL-MCNC: 1.12 MG/DL (ref 0.6–1.3)
GFR SERPL CREATININE-BSD FRML MDRD: 66 ML/MIN/1.73SQ M
GLUCOSE P FAST SERPL-MCNC: 98 MG/DL (ref 65–99)
POTASSIUM SERPL-SCNC: 3.5 MMOL/L (ref 3.5–5.3)
SODIUM SERPL-SCNC: 144 MMOL/L (ref 136–145)

## 2021-10-14 PROCEDURE — 82088 ASSAY OF ALDOSTERONE: CPT

## 2021-10-14 PROCEDURE — 84244 ASSAY OF RENIN: CPT

## 2021-10-14 PROCEDURE — 82627 DEHYDROEPIANDROSTERONE: CPT

## 2021-10-14 PROCEDURE — 80048 BASIC METABOLIC PNL TOTAL CA: CPT

## 2021-10-14 PROCEDURE — 36415 COLL VENOUS BLD VENIPUNCTURE: CPT

## 2021-10-15 LAB — DHEA-S SERPL-MCNC: 150 UG/DL (ref 30.9–295.6)

## 2021-10-16 ENCOUNTER — APPOINTMENT (OUTPATIENT)
Dept: LAB | Facility: HOSPITAL | Age: 71
End: 2021-10-16
Payer: COMMERCIAL

## 2021-10-16 DIAGNOSIS — D35.02 ADRENAL ADENOMA, LEFT: ICD-10-CM

## 2021-10-16 LAB
CREAT 24H UR-MRATE: 1.3 G/24HR (ref 0.8–1.8)
SPECIMEN VOL UR: 1750 ML

## 2021-10-16 PROCEDURE — 83835 ASSAY OF METANEPHRINES: CPT

## 2021-10-16 PROCEDURE — 82530 CORTISOL FREE: CPT

## 2021-10-16 PROCEDURE — 82570 ASSAY OF URINE CREATININE: CPT

## 2021-10-16 PROCEDURE — 82384 ASSAY THREE CATECHOLAMINES: CPT

## 2021-10-20 LAB
DOPAMINE 24H UR-MRATE: 322 UG/24 HR (ref 0–510)
DOPAMINE UR-MCNC: 184 UG/L
EPINEPH 24H UR-MRATE: 7 UG/24 HR (ref 0–20)
EPINEPH UR-MCNC: 4 UG/L
NOREPINEPH 24H UR-MRATE: 56 UG/24 HR (ref 0–135)
NOREPINEPH UR-MCNC: 32 UG/L

## 2021-10-21 LAB
ALDOST SERPL-MCNC: 7.4 NG/DL (ref 0–30)
ALDOST/RENIN PLAS-RTO: 8.1 {RATIO} (ref 0–30)
RENIN PLAS-CCNC: 0.92 NG/ML/HR (ref 0.17–5.38)

## 2021-10-22 LAB
CORTIS F 24H UR-MRATE: 88 UG/24 HR (ref 5–64)
CORTIS F UR-MCNC: 50 UG/L
METANEPH 24H UR-MRATE: 247 UG/24 HR (ref 58–276)
METANEPHS 24H UR-MCNC: 141 UG/L
NORMETANEPHRINE 24H UR-MCNC: 236 UG/L
NORMETANEPHRINE 24H UR-MRATE: 413 UG/24 HR (ref 156–729)

## 2021-10-25 ENCOUNTER — HOSPITAL ENCOUNTER (OUTPATIENT)
Dept: RADIOLOGY | Facility: HOSPITAL | Age: 71
Discharge: HOME | End: 2021-10-25
Attending: FAMILY MEDICINE | Admitting: RADIOLOGY
Payer: COMMERCIAL

## 2021-10-25 DIAGNOSIS — E04.1 RIGHT THYROID NODULE: ICD-10-CM

## 2021-10-25 PROCEDURE — 10005 FNA BX W/US GDN 1ST LES: CPT

## 2021-10-25 PROCEDURE — 88173 CYTOPATH EVAL FNA REPORT: CPT | Performed by: PATHOLOGY

## 2021-10-25 RX ORDER — LIDOCAINE HYDROCHLORIDE 10 MG/ML
5 INJECTION, SOLUTION EPIDURAL; INFILTRATION; INTRACAUDAL; PERINEURAL ONCE
Status: COMPLETED | OUTPATIENT
Start: 2021-10-25 | End: 2021-10-25

## 2021-10-25 RX ADMIN — LIDOCAINE HYDROCHLORIDE 3 ML: 10 INJECTION, SOLUTION EPIDURAL; INFILTRATION; INTRACAUDAL; PERINEURAL at 10:05

## 2021-10-28 DIAGNOSIS — E04.2 MULTIPLE THYROID NODULES: Primary | ICD-10-CM

## 2021-11-08 ENCOUNTER — OFFICE VISIT (OUTPATIENT)
Dept: OTOLARYNGOLOGY | Facility: CLINIC | Age: 71
End: 2021-11-08
Payer: COMMERCIAL

## 2021-11-08 VITALS — BODY MASS INDEX: 26.36 KG/M2 | WEIGHT: 164 LBS | HEIGHT: 66 IN | TEMPERATURE: 97.5 F

## 2021-11-08 DIAGNOSIS — E04.2 MULTIPLE THYROID NODULES: Primary | ICD-10-CM

## 2021-11-08 PROCEDURE — 99203 OFFICE O/P NEW LOW 30 MIN: CPT | Performed by: NURSE PRACTITIONER

## 2021-11-18 ENCOUNTER — APPOINTMENT (OUTPATIENT)
Dept: LAB | Facility: HOSPITAL | Age: 71
End: 2021-11-18
Attending: SPECIALIST
Payer: COMMERCIAL

## 2021-11-18 ENCOUNTER — TELEPHONE (OUTPATIENT)
Dept: PREADMISSION TESTING | Facility: HOSPITAL | Age: 71
End: 2021-11-18

## 2021-11-18 DIAGNOSIS — R35.1 NOCTURIA: ICD-10-CM

## 2021-11-18 LAB — PSA SERPL-MCNC: 1.4 NG/ML (ref 0–4)

## 2021-11-18 PROCEDURE — 84153 ASSAY OF PSA TOTAL: CPT

## 2021-11-19 RX ORDER — IBUPROFEN 200 MG
TABLET ORAL EVERY 6 HOURS PRN
COMMUNITY

## 2021-11-29 ENCOUNTER — TELEPHONE (OUTPATIENT)
Dept: GASTROENTEROLOGY | Facility: CLINIC | Age: 71
End: 2021-11-29

## 2021-11-29 ENCOUNTER — ANESTHESIA EVENT (OUTPATIENT)
Dept: GASTROENTEROLOGY | Facility: AMBULARY SURGERY CENTER | Age: 71
End: 2021-11-29

## 2021-11-29 ENCOUNTER — HOSPITAL ENCOUNTER (OUTPATIENT)
Dept: GASTROENTEROLOGY | Facility: AMBULARY SURGERY CENTER | Age: 71
Setting detail: OUTPATIENT SURGERY
Discharge: HOME | End: 2021-11-29
Attending: INTERNAL MEDICINE

## 2021-11-29 ENCOUNTER — ANESTHESIA (OUTPATIENT)
Dept: GASTROENTEROLOGY | Facility: AMBULARY SURGERY CENTER | Age: 71
End: 2021-11-29

## 2021-11-29 VITALS
DIASTOLIC BLOOD PRESSURE: 77 MMHG | HEART RATE: 161 BPM | HEIGHT: 66 IN | TEMPERATURE: 97.2 F | RESPIRATION RATE: 18 BRPM | SYSTOLIC BLOOD PRESSURE: 135 MMHG | OXYGEN SATURATION: 97 % | BODY MASS INDEX: 26.47 KG/M2

## 2021-11-29 DIAGNOSIS — Z86.010 HISTORY OF COLON POLYPS: ICD-10-CM

## 2021-11-29 RX ORDER — SODIUM CHLORIDE, SODIUM LACTATE, POTASSIUM CHLORIDE, CALCIUM CHLORIDE 600; 310; 30; 20 MG/100ML; MG/100ML; MG/100ML; MG/100ML
125 INJECTION, SOLUTION INTRAVENOUS CONTINUOUS
OUTPATIENT
Start: 2022-03-09

## 2021-11-29 NOTE — ANESTHESIA PREPROCEDURE EVALUATION
Procedure:  COLONOSCOPY    Relevant Problems   CARDIO   (+) Hypertension      GI/HEPATIC   (+) Liver cyst      /RENAL   (+) BPH (benign prostatic hyperplasia)      NEURO/PSYCH   (+) Chronic pain of left ankle     Recently diagnosed incidental adrenal adenoma. Endocrinology note reviewed. Scheduled for biopsy in near future.labs reviewed     Patient HR tdy is running between 135-160/min. On nov 03, it was 72/min as per endocrinology consult. Patient has hyperthyroidism, with adrenal adenoma. No confirmed diagnosis yet.       Physical Exam    Airway    Mallampati score: II  TM Distance: >3 FB  Neck ROM: full     Dental   No notable dental hx     Cardiovascular  Cardiovascular exam normal    Pulmonary  Pulmonary exam normal     Other Findings        Anesthesia Plan  ASA Score- 3     Anesthesia Type- IV sedation with anesthesia with ASA Monitors.         Additional Monitors:   Airway Plan:     Comment: In view of untreated hyperthyroidim, undiagnosed adenoma adrenal, case to be rescheduled after endo consult..       Plan Factors-Exercise tolerance (METS): >4 METS.    Chart reviewed. EKG reviewed. Imaging results reviewed. Existing labs reviewed. Patient summary reviewed.    Patient is not a current smoker.         Induction-     Postoperative Plan-     Informed Consent- Anesthetic plan and risks discussed with patient.  I personally reviewed this patient with the CRNA. Discussed and agreed on the Anesthesia Plan with the CRNA..

## 2021-12-06 ENCOUNTER — APPOINTMENT (OUTPATIENT)
Dept: LAB | Facility: HOSPITAL | Age: 71
End: 2021-12-06
Payer: COMMERCIAL

## 2021-12-06 DIAGNOSIS — D35.02 ADRENAL ADENOMA, LEFT: ICD-10-CM

## 2021-12-06 LAB — CORTIS AM PEAK SERPL-MCNC: 4.9 UG/DL (ref 4.2–22.4)

## 2021-12-06 PROCEDURE — 36415 COLL VENOUS BLD VENIPUNCTURE: CPT

## 2021-12-06 PROCEDURE — 82533 TOTAL CORTISOL: CPT

## 2021-12-20 DIAGNOSIS — I10 ESSENTIAL HYPERTENSION: ICD-10-CM

## 2021-12-20 RX ORDER — AMLODIPINE BESYLATE 10 MG/1
10 TABLET ORAL DAILY
Qty: 90 TABLET | Refills: 1 | Status: SHIPPED | OUTPATIENT
Start: 2021-12-20 | End: 2022-05-29 | Stop reason: SDUPTHER

## 2022-01-04 DIAGNOSIS — R94.7 ABNORMAL DEXAMETHASONE SUPPRESSION TEST: Primary | ICD-10-CM

## 2022-01-04 RX ORDER — DEXAMETHASONE 1 MG
1 TABLET ORAL ONCE
Qty: 1 TABLET | Refills: 1 | Status: SHIPPED | OUTPATIENT
Start: 2022-01-04 | End: 2022-01-04

## 2022-01-11 ENCOUNTER — APPOINTMENT (OUTPATIENT)
Dept: LAB | Facility: HOSPITAL | Age: 72
End: 2022-01-11
Payer: COMMERCIAL

## 2022-01-11 DIAGNOSIS — R94.7 ABNORMAL DEXAMETHASONE SUPPRESSION TEST: ICD-10-CM

## 2022-01-11 LAB — CORTIS AM PEAK SERPL-MCNC: 6.1 UG/DL (ref 4.2–22.4)

## 2022-01-11 PROCEDURE — 80299 QUANTITATIVE ASSAY DRUG: CPT

## 2022-01-11 PROCEDURE — 82533 TOTAL CORTISOL: CPT

## 2022-01-21 LAB
Lab: NORMAL
MISCELLANEOUS LAB TEST RESULT: NORMAL

## 2022-02-07 ENCOUNTER — TELEPHONE (OUTPATIENT)
Dept: ENDOCRINOLOGY | Facility: CLINIC | Age: 72
End: 2022-02-07

## 2022-02-07 NOTE — TELEPHONE ENCOUNTER
Patient's daughter Sidney Agent informed of lab results  She verbalized understanding  She stated that [place] is out of the country until march, she have him complete labs once he returns

## 2022-02-07 NOTE — TELEPHONE ENCOUNTER
----- Message from Mushtaq Cutler DO sent at 2/3/2022  1:13 PM EST -----  Pt is Turkmen speaking  His labwork was reviewed and he appears to have elevated cortisol levels after taking dexamethasone for suppression  We will need to do further testing  Please advise patient to have midnight salivary cortisol test performed  He can  a kit from the lab, it will contain swabs for him to collect his saliva to be tested  He should relax for one hour prior to the collection, he should not eat or drink anything for 30 minutes prior to using the swab to collect saliva and he should do this test between 11pm and midnight for three nights  I will additionally place labwork for another hormone level (ACTH) that he can do at his convenience if he is having labwork done for any other providers

## 2022-03-05 RX ORDER — DEXAMETHASONE 1 MG
TABLET ORAL
COMMUNITY
Start: 2022-01-04 | End: 2022-03-07

## 2022-03-07 ENCOUNTER — OFFICE VISIT (OUTPATIENT)
Dept: FAMILY MEDICINE CLINIC | Facility: CLINIC | Age: 72
End: 2022-03-07
Payer: COMMERCIAL

## 2022-03-07 ENCOUNTER — HOSPITAL ENCOUNTER (OUTPATIENT)
Dept: RADIOLOGY | Facility: HOSPITAL | Age: 72
Discharge: HOME | End: 2022-03-07
Attending: FAMILY MEDICINE
Payer: COMMERCIAL

## 2022-03-07 VITALS
HEART RATE: 81 BPM | HEIGHT: 66 IN | BODY MASS INDEX: 27.32 KG/M2 | TEMPERATURE: 98.9 F | DIASTOLIC BLOOD PRESSURE: 88 MMHG | OXYGEN SATURATION: 96 % | SYSTOLIC BLOOD PRESSURE: 122 MMHG | WEIGHT: 170 LBS | RESPIRATION RATE: 20 BRPM

## 2022-03-07 DIAGNOSIS — M25.511 CHRONIC RIGHT SHOULDER PAIN: ICD-10-CM

## 2022-03-07 DIAGNOSIS — Z12.11 COLON CANCER SCREENING: ICD-10-CM

## 2022-03-07 DIAGNOSIS — I10 PRIMARY HYPERTENSION: Primary | ICD-10-CM

## 2022-03-07 DIAGNOSIS — G89.29 CHRONIC RIGHT SHOULDER PAIN: ICD-10-CM

## 2022-03-07 PROCEDURE — 73030 X-RAY EXAM OF SHOULDER: CPT

## 2022-03-07 PROCEDURE — 99213 OFFICE O/P EST LOW 20 MIN: CPT | Performed by: FAMILY MEDICINE

## 2022-03-07 NOTE — PROGRESS NOTES
Assessment/Plan:    No problem-specific Assessment & Plan notes found for this encounter     htn stable, does have edema LE but can try elevation and dietary salt avoidance    nsaid risks advised    xr right shoulder r/o DJD  PT if no better     Diagnoses and all orders for this visit:    Primary hypertension    Colon cancer screening  -     Ambulatory referral for colonoscopy; Future    Chronic right shoulder pain  -     XR shoulder 2+ vw right; Future    Other orders  -     Discontinue: dexamethasone (DECADRON) 1 mg tablet; TAKE 1 TABLET BY MOUTH BY MOUTH ONCE FOR 1 DOSE AT 11 IN THE EVENING AND GET LABWORK DONE THE NEXT MORNING AT 8AM (Patient not taking: Reported on 3/7/2022)              Return in about 6 months (around 9/7/2022) for Recheck  Subjective:      Patient ID: Sonal Whitlock is a 70 y o  male  Chief Complaint   Patient presents with    Follow-up     6 month      sas/cma       HPI  Was in [place] for 1m  More CAPPS over there  Taking his bp meds  some leg edema for 2m but no syncope  Ongoing right shoulder pain, kale at night, had fallen on Shoulder July 2021  nsaid risks advised  No shoulder xr in past on right  No arm swelling  No neck pain    The following portions of the patient's history were reviewed and updated as appropriate: allergies, current medications, past family history, past medical history, past social history, past surgical history and problem list     Review of Systems   Constitutional: Negative for chills and fever  Respiratory: Negative for shortness of breath  Musculoskeletal: Positive for arthralgias  Current Outpatient Medications   Medication Sig Dispense Refill    amLODIPine (NORVASC) 10 mg tablet Take 1 tablet (10 mg total) by mouth daily 90 tablet 1    ibuprofen (MOTRIN) 200 mg tablet Take by mouth every 6 (six) hours as needed for mild pain       No current facility-administered medications for this visit         Objective:    /88   Pulse 81 Temp 98 9 °F (37 2 °C)   Resp 20   Ht 5' 6" (1 676 m)   Wt 77 1 kg (170 lb)   SpO2 96%   BMI 27 44 kg/m²        Physical Exam  Vitals and nursing note reviewed  Constitutional:       General: He is not in acute distress  Appearance: He is well-developed  He is not ill-appearing  HENT:      Head: Normocephalic  Right Ear: Tympanic membrane normal       Left Ear: Tympanic membrane normal    Eyes:      General: No scleral icterus  Conjunctiva/sclera: Conjunctivae normal    Cardiovascular:      Rate and Rhythm: Normal rate and regular rhythm  Heart sounds: No murmur heard  Pulmonary:      Effort: Pulmonary effort is normal  No respiratory distress  Breath sounds: No rales  Abdominal:      General: There is no distension  Palpations: Abdomen is soft  Tenderness: There is no abdominal tenderness  Musculoskeletal:         General: Tenderness present  No deformity  Cervical back: Neck supple  Comments: Right shoulder pain with rom but alexander/neer neg   Skin:     General: Skin is warm and dry  Coloration: Skin is not pale  Neurological:      Mental Status: He is alert  Motor: No weakness  Gait: Gait normal    Psychiatric:         Mood and Affect: Mood normal          Behavior: Behavior normal          Thought Content:  Thought content normal                 Felicia Borja, DO

## 2022-03-07 NOTE — PATIENT INSTRUCTIONS
Please follow a low salt diet  Get the right shoulder xray done for you pain  Try to elevate your legs when resting to reduce swelling

## 2022-03-08 ENCOUNTER — APPOINTMENT (OUTPATIENT)
Dept: LAB | Facility: HOSPITAL | Age: 72
End: 2022-03-08
Attending: FAMILY MEDICINE
Payer: COMMERCIAL

## 2022-03-08 DIAGNOSIS — Z11.59 NEED FOR HEPATITIS C SCREENING TEST: ICD-10-CM

## 2022-03-08 DIAGNOSIS — Z13.89 SCREENING FOR CARDIOVASCULAR, RESPIRATORY, AND GENITOURINARY DISEASES: ICD-10-CM

## 2022-03-08 DIAGNOSIS — R73.03 PREDIABETES: ICD-10-CM

## 2022-03-08 DIAGNOSIS — Z13.1 SCREENING FOR DIABETES MELLITUS (DM): ICD-10-CM

## 2022-03-08 DIAGNOSIS — D35.02 ADRENAL ADENOMA, LEFT: ICD-10-CM

## 2022-03-08 DIAGNOSIS — Z13.83 SCREENING FOR CARDIOVASCULAR, RESPIRATORY, AND GENITOURINARY DISEASES: ICD-10-CM

## 2022-03-08 DIAGNOSIS — R94.7 ABNORMAL DEXAMETHASONE SUPPRESSION TEST: ICD-10-CM

## 2022-03-08 DIAGNOSIS — I10 ESSENTIAL HYPERTENSION: ICD-10-CM

## 2022-03-08 DIAGNOSIS — Z13.6 SCREENING FOR CARDIOVASCULAR, RESPIRATORY, AND GENITOURINARY DISEASES: ICD-10-CM

## 2022-03-08 DIAGNOSIS — Z12.5 PROSTATE CANCER SCREENING: ICD-10-CM

## 2022-03-08 LAB
ALBUMIN SERPL BCP-MCNC: 3.7 G/DL (ref 3.5–5)
ALP SERPL-CCNC: 68 U/L (ref 46–116)
ALT SERPL W P-5'-P-CCNC: 32 U/L (ref 12–78)
ANION GAP SERPL CALCULATED.3IONS-SCNC: 12 MMOL/L (ref 4–13)
AST SERPL W P-5'-P-CCNC: 19 U/L (ref 5–45)
BILIRUB SERPL-MCNC: 0.89 MG/DL (ref 0.2–1)
BUN SERPL-MCNC: 18 MG/DL (ref 5–25)
CALCIUM SERPL-MCNC: 8.6 MG/DL (ref 8.3–10.1)
CHLORIDE SERPL-SCNC: 105 MMOL/L (ref 100–108)
CHOLEST SERPL-MCNC: 249 MG/DL
CO2 SERPL-SCNC: 26 MMOL/L (ref 21–32)
CREAT SERPL-MCNC: 0.97 MG/DL (ref 0.6–1.3)
EST. AVERAGE GLUCOSE BLD GHB EST-MCNC: 117 MG/DL
GFR SERPL CREATININE-BSD FRML MDRD: 78 ML/MIN/1.73SQ M
GLUCOSE P FAST SERPL-MCNC: 100 MG/DL (ref 65–99)
HBA1C MFR BLD: 5.7 %
HCV AB SER QL: NORMAL
HDLC SERPL-MCNC: 96 MG/DL
LDLC SERPL CALC-MCNC: 126 MG/DL (ref 0–100)
POTASSIUM SERPL-SCNC: 3.6 MMOL/L (ref 3.5–5.3)
PROT SERPL-MCNC: 7 G/DL (ref 6.4–8.2)
PSA SERPL-MCNC: 1.5 NG/ML (ref 0–4)
SODIUM SERPL-SCNC: 143 MMOL/L (ref 136–145)
TRIGL SERPL-MCNC: 134 MG/DL

## 2022-03-08 PROCEDURE — G0103 PSA SCREENING: HCPCS

## 2022-03-08 PROCEDURE — 82024 ASSAY OF ACTH: CPT

## 2022-03-08 PROCEDURE — 83036 HEMOGLOBIN GLYCOSYLATED A1C: CPT

## 2022-03-08 PROCEDURE — 80061 LIPID PANEL: CPT

## 2022-03-08 PROCEDURE — 36415 COLL VENOUS BLD VENIPUNCTURE: CPT

## 2022-03-08 PROCEDURE — 80053 COMPREHEN METABOLIC PANEL: CPT

## 2022-03-08 PROCEDURE — 86803 HEPATITIS C AB TEST: CPT

## 2022-03-09 ENCOUNTER — HOSPITAL ENCOUNTER (OUTPATIENT)
Dept: RADIOLOGY | Facility: HOSPITAL | Age: 72
Discharge: HOME | End: 2022-03-09
Admitting: RADIOLOGY
Payer: COMMERCIAL

## 2022-03-09 DIAGNOSIS — E04.2 MULTIPLE THYROID NODULES: ICD-10-CM

## 2022-03-09 LAB — ACTH PLAS-MCNC: 45.7 PG/ML (ref 7.2–63.3)

## 2022-03-09 PROCEDURE — 88172 CYTP DX EVAL FNA 1ST EA SITE: CPT | Performed by: PATHOLOGY

## 2022-03-09 PROCEDURE — 88173 CYTOPATH EVAL FNA REPORT: CPT | Performed by: PATHOLOGY

## 2022-03-09 PROCEDURE — 10005 FNA BX W/US GDN 1ST LES: CPT

## 2022-03-09 PROCEDURE — 10005 FNA BX W/US GDN 1ST LES: CPT | Performed by: RADIOLOGY

## 2022-03-09 RX ORDER — LIDOCAINE WITH 8.4% SOD BICARB 0.9%(10ML)
10 SYRINGE (ML) INJECTION ONCE
Status: COMPLETED | OUTPATIENT
Start: 2022-03-09 | End: 2022-03-09

## 2022-03-09 RX ADMIN — Medication 5 ML: at 09:35

## 2022-03-11 ENCOUNTER — APPOINTMENT (OUTPATIENT)
Dept: LAB | Facility: HOSPITAL | Age: 72
End: 2022-03-11
Payer: COMMERCIAL

## 2022-03-11 DIAGNOSIS — D35.02 ADRENAL ADENOMA, LEFT: ICD-10-CM

## 2022-03-11 DIAGNOSIS — R94.7 ABNORMAL DEXAMETHASONE SUPPRESSION TEST: ICD-10-CM

## 2022-03-11 PROCEDURE — 82533 TOTAL CORTISOL: CPT

## 2022-03-20 LAB
CORTIS BS SAL-MCNC: 0.19 UG/DL
CORTIS SP1 P CHAL SAL-MCNC: 0.26 UG/DL
CORTIS SP2 P CHAL SAL-MCNC: 0.25 UG/DL

## 2022-03-21 ENCOUNTER — TELEPHONE (OUTPATIENT)
Dept: ENDOCRINOLOGY | Facility: CLINIC | Age: 72
End: 2022-03-21

## 2022-03-22 NOTE — TELEPHONE ENCOUNTER
Spoke with daughter, Mayra Estrada and she confirmed he collected the samples at 11:00 pm three nights in a row

## 2022-03-23 NOTE — TELEPHONE ENCOUNTER
Patient will need further testing with an MRI pituitary with and without contrast-if patient is willing please order      If patient would like to discuss this further during appointment, please help schedule

## 2022-03-24 DIAGNOSIS — E04.2 MULTIPLE THYROID NODULES: Primary | ICD-10-CM

## 2022-03-24 NOTE — PROGRESS NOTES
FNAB indicating bethesda III, Afirma Benign  Notified pt daughter via phone    Repeat thyroid US in one year

## 2022-04-28 ENCOUNTER — TELEPHONE (OUTPATIENT)
Dept: ENDOCRINOLOGY | Facility: CLINIC | Age: 72
End: 2022-04-28

## 2022-05-12 ENCOUNTER — HOSPITAL ENCOUNTER (OUTPATIENT)
Dept: RADIOLOGY | Facility: HOSPITAL | Age: 72
Discharge: HOME | End: 2022-05-12
Attending: INTERNAL MEDICINE
Payer: COMMERCIAL

## 2022-05-12 DIAGNOSIS — R94.7 ABNORMAL DEXAMETHASONE SUPPRESSION TEST: ICD-10-CM

## 2022-05-12 PROCEDURE — A9581 GADOXETATE DISODIUM INJ: HCPCS | Performed by: INTERNAL MEDICINE

## 2022-05-12 PROCEDURE — 70553 MRI BRAIN STEM W/O & W/DYE: CPT

## 2022-05-12 PROCEDURE — G1004 CDSM NDSC: HCPCS

## 2022-05-12 RX ADMIN — GADOXETATE DISODIUM 8 ML: 181.43 INJECTION, SOLUTION INTRAVENOUS at 08:38

## 2022-05-26 ENCOUNTER — TELEPHONE (OUTPATIENT)
Dept: ENDOCRINOLOGY | Facility: CLINIC | Age: 72
End: 2022-05-26

## 2022-05-26 DIAGNOSIS — R94.7 ABNORMAL DEXAMETHASONE SUPPRESSION TEST: Primary | ICD-10-CM

## 2022-05-26 NOTE — TELEPHONE ENCOUNTER
----- Message from Trinidad Heredia MD sent at 5/26/2022  1:16 PM EDT -----  MRI pituitary suggestive of 0 7 cm lesion in the pituitary gland suggestive of a microadenoma   Recommend getting a the following labs done IGF-1, LH, FSH, prolactin TSH, free T4

## 2022-05-29 DIAGNOSIS — I10 ESSENTIAL HYPERTENSION: ICD-10-CM

## 2022-05-31 RX ORDER — AMLODIPINE BESYLATE 10 MG/1
10 TABLET ORAL DAILY
Qty: 90 TABLET | Refills: 1 | Status: SHIPPED | OUTPATIENT
Start: 2022-05-31

## 2022-06-16 ENCOUNTER — TELEPHONE (OUTPATIENT)
Dept: GASTROENTEROLOGY | Facility: CLINIC | Age: 72
End: 2022-06-16

## 2022-06-16 ENCOUNTER — TELEPHONE (OUTPATIENT)
Dept: FAMILY MEDICINE CLINIC | Facility: CLINIC | Age: 72
End: 2022-06-16

## 2022-06-16 NOTE — TELEPHONE ENCOUNTER
Patient stated that it was cancelled because his heart rate was elevated  Patient ware that he needs to reschedule this    Lauro Dakin, MA

## 2022-06-16 NOTE — TELEPHONE ENCOUNTER
Patients GI provider:  Dr Karen Burch    Number to return call: [phone]    Reason for call: Pt calling to schedule procedure  Pt will need a       Scheduled procedure/appointment date if applicable: N/A

## 2022-06-16 NOTE — TELEPHONE ENCOUNTER
Called the GI office and I told them that the patient never did the colonoscopy and that he needed to schedule this  Nurse stated that she will send a message to the  so that they can call and set up appt for the procedure  Patient aware that they will reach out to him    [address] [place] [address]

## 2022-06-16 NOTE — TELEPHONE ENCOUNTER
Dr Lenny Mott ordered colonoscopy but patient had done in November 2021  Results are not in chart as of today  Call Center stated patient did confirm he had this procedure  Call center just wanted to let Dr Nancy Caputo aware    Any questions, please call [address] [facility] [phone]

## 2022-06-16 NOTE — TELEPHONE ENCOUNTER
When you click on the documents attached to that 11/21 date, Dr Bridger Kinsey order for colonoscopy, one of the documents has "cancelled" written on it  It looks like this was cancelled   We need to call Jael Velasquez to confirm this with him [facility]

## 2022-06-24 ENCOUNTER — TELEMEDICINE (OUTPATIENT)
Dept: RHEUMATOLOGY | Facility: CLINIC | Age: 72
End: 2022-06-24
Payer: COMMERCIAL

## 2022-06-24 DIAGNOSIS — M25.50 DIFFUSE ARTHRALGIA: ICD-10-CM

## 2022-06-24 DIAGNOSIS — Z11.59 NEED FOR HEPATITIS B SCREENING TEST: ICD-10-CM

## 2022-06-24 DIAGNOSIS — E55.9 VITAMIN D DEFICIENCY: ICD-10-CM

## 2022-06-24 DIAGNOSIS — G89.29 CHRONIC RIGHT SHOULDER PAIN: Primary | ICD-10-CM

## 2022-06-24 DIAGNOSIS — M10.9 GOUT, UNSPECIFIED CAUSE, UNSPECIFIED CHRONICITY, UNSPECIFIED SITE: ICD-10-CM

## 2022-06-24 DIAGNOSIS — M25.511 CHRONIC RIGHT SHOULDER PAIN: Primary | ICD-10-CM

## 2022-06-24 PROCEDURE — 99205 OFFICE O/P NEW HI 60 MIN: CPT | Performed by: INTERNAL MEDICINE

## 2022-06-24 NOTE — PROGRESS NOTES
Virtual Regular Visit    Verification of patient location:    Patient is located in the following state in which I hold an active license [place]      Assessment/Plan:    Problem List Items Addressed This Visit    None     Visit Diagnoses     Chronic right shoulder pain    -  Primary    Relevant Orders    Antinuclear Antibodies, IFA    Sjogren's Antibodies    Sedimentation rate, automated    C-reactive protein    RF Screen w/ Reflex to Titer    Cyclic citrul peptide antibody, IgG    Ferritin    HLA-B27 antigen    CK    Uric acid    Ambulatory Referral to Orthopedic Surgery    Diffuse arthralgia        Relevant Orders    Antinuclear Antibodies, IFA    Sjogren's Antibodies    Sedimentation rate, automated    C-reactive protein    RF Screen w/ Reflex to Titer    Cyclic citrul peptide antibody, IgG    Ferritin    HLA-B27 antigen    CK    Uric acid    Gout, unspecified cause, unspecified chronicity, unspecified site         For HLA    Relevant Orders    HLA-B27 antigen    Need for hepatitis B screening test        Relevant Orders    Hepatitis B surface antigen    Hepatitis B core antibody, total    Vitamin D deficiency        Relevant Orders    Vitamin D 25 hydroxy               Reason for visit is new patient  Chief Complaint   Patient presents with    Virtual Regular Visit        Encounter provider Fiordaliza Pena MD    Provider located at [address] [place] [id number] [phone]      Recent Visits  No visits were found meeting these conditions  Showing recent visits within past 7 days and meeting all other requirements  Today's Visits  Date Type Provider Dept   06/24/22 Telemedicine Fiordaliza Pena MD [facility] today's visits and meeting all other requirements  Future Appointments  No visits were found meeting these conditions    Showing future appointments within next 150 days and meeting all other requirements       The patient was identified by name and date of birth  Luisa Mcdonough was informed that this is a telemedicine visit and that the visit is being conducted through Telephone  My office door was closed  No one else was in the room  He acknowledged consent and understanding of privacy and security of the video platform  The patient has agreed to participate and understands they can discontinue the visit at any time  Patient is aware this is a billable service  Subjective    HPI     Mr Arpit Stratton is a 70-year-old male with history significant for right shoulder osteoarthritis who presents for an evaluation of chronic shoulder pain  He is self-referred today  History is limited as patient is primarily French speaking  Initially he reported pain that has been affecting his arms and legs over the past year on a constant basis but on further clarification this seems to be more so limited to chronic right shoulder and arm pain along with intermittent pain of his left ankle where he has previously sustained a fracture and undergone an ORIF procedure  He has seen Orthopedics for this in the past and was advised conservative measures  He reports the right shoulder pain is present throughout the day and worsens at nighttime  He has noticed swelling that can affect his feet  It is unclear if he experiences morning stiffness  He tries not to take any over-the-counter pain medications due to concerns for potential side effects  Has not done physical therapy for the right shoulder  He did have an x-ray of his right shoulder done which showed mild degenerative changes at the acromioclavicular joint and it was also noted that the humeral head was elevated with respect to the glenoid and to further evaluate for rotator cuff arthropathy    He has not seen Orthopedics for this        Past Medical History:   Diagnosis Date    BPH (benign prostatic hyperplasia)     Disease of thyroid gland nodules    Hypertension     Kidney calculi     left    Kidney stone     Nocturia     Urinary incontinence     During the night, urinary frequency during the day  Past Surgical History:   Procedure Laterality Date    EXTRACORPOREAL SHOCK WAVE LITHOTRIPSY      FRACTURE SURGERY Right     ankle with hardware    MN CYSTO/URETERO W/LITHOTRIPSY &INDWELL STENT INSRT Left 7/18/2021    Procedure: CYSTOSCOPY URETEROSCOPY WITH LITHOTRIPSY HOLMIUM LASER, RETROGRADE PYELOGRAM AND INSERTION STENT URETERAL;  Surgeon: Carlos Martell MD;  Location: [address] [place] [address];  Service: Urology    TRANSURETHRAL RESECTION OF PROSTATE      US GUIDED THYROID BIOPSY  10/25/2021    US GUIDED THYROID BIOPSY  3/9/2022       Current Outpatient Medications   Medication Sig Dispense Refill    amLODIPine (NORVASC) 10 mg tablet Take 1 tablet (10 mg total) by mouth daily 90 tablet 1    ibuprofen (MOTRIN) 200 mg tablet Take by mouth every 6 (six) hours as needed for mild pain       No current facility-administered medications for this visit  No Known Allergies      Review of Systems  Constitutional: Negative for weight change, fevers, chills, night sweats, fatigue  ENT/Mouth: Negative for hearing changes, ear pain, nasal congestion, sinus pain, hoarseness, sore throat, rhinorrhea, swallowing difficulty  Eyes: Negative for pain, redness, discharge, vision changes  Cardiovascular: Negative for chest pain, SOB, palpitations  Respiratory: Negative for cough, sputum, wheezing, dyspnea  Gastrointestinal: Negative for nausea, vomiting, diarrhea, constipation, pain, heartburn  Genitourinary: Negative for dysuria, urinary frequency, hematuria  Musculoskeletal: As per HPI  Skin: Negative for skin rash, color changes  Neuro: Negative for weakness, numbness, tingling, loss of consciousness  Psych: Negative for anxiety, depression  Heme/Lymph: Negative for easy bruising, bleeding, lymphadenopathy          I personally reviewed x-ray of the right shoulder images in PACS which shows mild acromioclavicular arthritis  Assessment and plan:  Mr Silverio Patel is a 75-year-old male with history significant for right shoulder osteoarthritis who presents for an evaluation of chronic shoulder pain  He is self-referred today  Oli Stevenson presents today for an evaluation of what appears to be primarily chronic right shoulder pain over the past year which may be secondary to osteoarthritis or rotator cuff disease  I would recommend an evaluation with Orthopedics for this and a referral was provided today  As it is unclear if he is presenting with more widespread joint pains as the history with limited today I will complete a full inflammatory arthritis workup and determine based on this if a follow-up with Rheumatology is indicated  I spent 21 minutes directly with the patient during this visit  VIRTUAL VISIT Riky Vasquez verbally agrees to participate in [employer]  Pt is aware that [employer] could be limited without vital signs or the ability to perform a full hands-on physical [facility] understands he or the provider may request at any time to terminate the video visit and request the patient to seek care or treatment in person

## 2022-07-07 NOTE — TELEPHONE ENCOUNTER
service was not working, however, I called pt and began to speak to him which he seemed to understand then I could no longer hear pt  Will try to call back later today

## 2022-07-07 NOTE — TELEPHONE ENCOUNTER
Spoke to pt and he does need a , however, [employer] is not connecting right now  Will continue to try to connect  Pt would need to be scheduled at [facility] with any doctor

## 2022-07-11 ENCOUNTER — OFFICE VISIT (OUTPATIENT)
Dept: OBGYN CLINIC | Facility: CLINIC | Age: 72
End: 2022-07-11
Payer: COMMERCIAL

## 2022-07-11 VITALS
HEIGHT: 66 IN | BODY MASS INDEX: 26.68 KG/M2 | DIASTOLIC BLOOD PRESSURE: 88 MMHG | WEIGHT: 166 LBS | HEART RATE: 71 BPM | SYSTOLIC BLOOD PRESSURE: 146 MMHG

## 2022-07-11 DIAGNOSIS — M75.101 ROTATOR CUFF TEAR ARTHROPATHY, RIGHT: Primary | ICD-10-CM

## 2022-07-11 DIAGNOSIS — M12.811 ROTATOR CUFF TEAR ARTHROPATHY, RIGHT: Primary | ICD-10-CM

## 2022-07-11 PROCEDURE — 99214 OFFICE O/P EST MOD 30 MIN: CPT | Performed by: ORTHOPAEDIC SURGERY

## 2022-07-11 NOTE — PROGRESS NOTES
Assessment/Plan:  1  Rotator cuff tear arthropathy, right  Ambulatory Referral to Physical Therapy    CANCELED: Ambulatory Referral to Physical Therapy       Scribe Attestation    I,:  Clinton Rashid am acting as a scribe while in the presence of the attending physician :       I,:  Graciela Sanchez MD personally performed the services described in this documentation    as scribed in my presence :         Efra Acharya upon examination and review of the xrays of the right shoulder demonstrates symptoms consistent with rotator cuff tear arthropathy  I am encouraged that he is still quite functional, but does demonstrate some weakness of the shoulder  I do believe that non operative care is most appropriate for him  With this in mind, I did provide him with a referral to physical therapy  He may continue with activities of daily living as tolerated with no specific restrictions  Efragutierrez Acharya verbalized understanding was amenable to the treatment plan presented to him today  I will see him back in 6 weeks for repeat clinical evaluation  Subjective:   Julio Cesar Stubbs is a 67 y o  male who presents to the office today for initial evaluation of his right shoulder  He is referred to me today by Dr Matt Canada  He does have polyarthralgia  However has been experiencing persistent shoulder pain  His shoulder is typically worse at night and notes mild-to-moderate pain during the day  He remarks on a previous traumatic fall of the shoulder in July 2021  In addition, he did have a fall the other day but is feeling better  He notes that he has been swimming and has been feeling some improvements for overall function  He has not undergone any physical therapy up to this point  Today he denies any distal paresthesias  Review of Systems   Constitutional: Negative for chills, fever and unexpected weight change  HENT: Negative for hearing loss, nosebleeds and sore throat      Eyes: Negative for pain, redness and visual disturbance  Respiratory: Negative for cough, shortness of breath and wheezing  Cardiovascular: Negative for chest pain, palpitations and leg swelling  Gastrointestinal: Negative for abdominal pain, nausea and vomiting  Endocrine: Negative for polyphagia and polyuria  Genitourinary: Negative for dysuria and hematuria  Musculoskeletal:        See HPI   Skin: Negative for rash and wound  Neurological: Negative for dizziness, numbness and headaches  Psychiatric/Behavioral: Negative for decreased concentration and suicidal ideas  The patient is not nervous/anxious  Past Medical History:   Diagnosis Date    BPH (benign prostatic hyperplasia)     Disease of thyroid gland     nodules    Hypertension     Kidney calculi     left    Kidney stone     Nocturia     Urinary incontinence     During the night, urinary frequency during the day  Past Surgical History:   Procedure Laterality Date    EXTRACORPOREAL SHOCK WAVE LITHOTRIPSY      FRACTURE SURGERY Right     ankle with hardware    VA CYSTO/URETERO W/LITHOTRIPSY &INDWELL STENT INSRT Left 2021    Procedure: CYSTOSCOPY URETEROSCOPY WITH LITHOTRIPSY HOLMIUM LASER, RETROGRADE PYELOGRAM AND INSERTION STENT URETERAL;  Surgeon: Kwame Fernandez MD;  Location: [address] [place] [address];  Service: Urology    TRANSURETHRAL RESECTION OF [address] THYROID BIOPSY  10/25/2021    US GUIDED THYROID BIOPSY  3/9/2022       History reviewed  No pertinent family history      Social History     Occupational History    Not on file   Tobacco Use    Smoking status: Former Smoker     Types: Cigarettes     Quit date:      Years since quittin 5    Smokeless tobacco: Never Used   Vaping Use    Vaping Use: Never used   Substance and Sexual Activity    Alcohol use: Yes     Comment: rare    Drug use: No    Sexual activity: Not on file         Current Outpatient Medications:     amLODIPine (NORVASC) 10 mg tablet, Take 1 tablet (10 mg total) by mouth daily, Disp: 90 tablet, Rfl: 1    ibuprofen (MOTRIN) 200 mg tablet, Take by mouth every 6 (six) hours as needed for mild pain, Disp: , Rfl:     No Known Allergies    Objective:  Vitals:    07/11/22 0832   BP: 146/88   Pulse: 71       Right Shoulder Exam     Tenderness   The patient is experiencing no tenderness  Range of Motion   Active abduction: 150   External rotation: 70   Internal rotation 0 degrees: L4     Muscle Strength   Abduction: 4/5   Internal rotation: 5/5   External rotation: 3/5     Other   Erythema: absent  Scars: absent  Sensation: normal  Pulse: present            Physical Exam  Vitals reviewed  Constitutional:       Appearance: He is well-developed  HENT:      Head: Normocephalic and atraumatic  Eyes:      General:         Right eye: No discharge  Left eye: No discharge  Conjunctiva/sclera: Conjunctivae normal    Cardiovascular:      Rate and Rhythm: Normal rate  Pulmonary:      Effort: Pulmonary effort is normal  No respiratory distress  Musculoskeletal:      Cervical back: Normal range of motion and neck supple  Comments: As noted in HPI   Skin:     General: Skin is warm and dry  Neurological:      Mental Status: He is alert and oriented to person, place, and time  Psychiatric:         Behavior: Behavior normal          Thought Content: Thought content normal          Judgment: Judgment normal          I have personally reviewed pertinent films in PACS and my interpretation is as follows:    X-rays of the right shoulder demonstrates mild glenohumeral joint osteoarthritis  The humeral head is elevated in relation to the glenoid indicating rotator cuff arthropathy  Mild-to-moderate acromioclavicular joint osteoarthritis is evident  No obvious lytic or blastic lesions demonstrated

## 2022-07-11 NOTE — LETTER
July 11, 2022     Kevin Baer DO  [facility] [address] [address]    Patient: Milyl Pond   YOB: 1950   Date of Visit: 7/11/2022     Dear Dr Beulah Allen      Thank you for referring Jeanna Boyd to me for evaluation  Below are the relevant portions of my assessment and plan of care  If you have questions, please do not hesitate to call me  I look forward to following Alfredo Zaragoza along with you           Sincerely,        Ramiro Worley MD        CC: Cecilia Bunch MD    Progress Notes:

## 2022-08-24 DIAGNOSIS — I10 ESSENTIAL HYPERTENSION: ICD-10-CM

## 2022-08-24 RX ORDER — AMLODIPINE BESYLATE 10 MG/1
10 TABLET ORAL DAILY
Qty: 90 TABLET | Refills: 0 | Status: SHIPPED | OUTPATIENT
Start: 2022-08-24 | End: 2022-09-07 | Stop reason: SDUPTHER

## 2022-09-07 ENCOUNTER — OFFICE VISIT (OUTPATIENT)
Dept: FAMILY MEDICINE CLINIC | Facility: CLINIC | Age: 72
End: 2022-09-07
Payer: COMMERCIAL

## 2022-09-07 VITALS
RESPIRATION RATE: 16 BRPM | HEIGHT: 66 IN | OXYGEN SATURATION: 98 % | TEMPERATURE: 98 F | BODY MASS INDEX: 26.68 KG/M2 | WEIGHT: 166 LBS | DIASTOLIC BLOOD PRESSURE: 80 MMHG | SYSTOLIC BLOOD PRESSURE: 122 MMHG | HEART RATE: 69 BPM

## 2022-09-07 DIAGNOSIS — Z13.89 SCREENING FOR CARDIOVASCULAR, RESPIRATORY, AND GENITOURINARY DISEASES: ICD-10-CM

## 2022-09-07 DIAGNOSIS — I10 PRIMARY HYPERTENSION: ICD-10-CM

## 2022-09-07 DIAGNOSIS — N40.1 BENIGN PROSTATIC HYPERPLASIA WITH LOWER URINARY TRACT SYMPTOMS, SYMPTOM DETAILS UNSPECIFIED: ICD-10-CM

## 2022-09-07 DIAGNOSIS — Z12.11 COLON CANCER SCREENING: ICD-10-CM

## 2022-09-07 DIAGNOSIS — I10 ESSENTIAL HYPERTENSION: ICD-10-CM

## 2022-09-07 DIAGNOSIS — Z12.5 ENCOUNTER FOR SCREENING FOR MALIGNANT NEOPLASM OF PROSTATE: ICD-10-CM

## 2022-09-07 DIAGNOSIS — Z13.83 SCREENING FOR CARDIOVASCULAR, RESPIRATORY, AND GENITOURINARY DISEASES: ICD-10-CM

## 2022-09-07 DIAGNOSIS — R73.03 PREDIABETES: ICD-10-CM

## 2022-09-07 DIAGNOSIS — Z13.6 SCREENING FOR CARDIOVASCULAR, RESPIRATORY, AND GENITOURINARY DISEASES: ICD-10-CM

## 2022-09-07 DIAGNOSIS — Z00.00 MEDICARE ANNUAL WELLNESS VISIT, SUBSEQUENT: Primary | ICD-10-CM

## 2022-09-07 PROBLEM — E87.6 HYPOKALEMIA: Status: RESOLVED | Noted: 2021-07-21 | Resolved: 2022-09-07

## 2022-09-07 PROCEDURE — 99214 OFFICE O/P EST MOD 30 MIN: CPT | Performed by: FAMILY MEDICINE

## 2022-09-07 PROCEDURE — G0439 PPPS, SUBSEQ VISIT: HCPCS | Performed by: FAMILY MEDICINE

## 2022-09-07 RX ORDER — TAMSULOSIN HYDROCHLORIDE 0.4 MG/1
0.4 CAPSULE ORAL
Qty: 90 CAPSULE | Refills: 1 | Status: SHIPPED | OUTPATIENT
Start: 2022-09-07

## 2022-09-07 RX ORDER — AMLODIPINE BESYLATE 10 MG/1
10 TABLET ORAL DAILY
Qty: 90 TABLET | Refills: 1 | Status: SHIPPED | OUTPATIENT
Start: 2022-09-07

## 2022-09-07 NOTE — PATIENT INSTRUCTIONS
We can try a daily pill called tamsulosin (flomax) to help you empty your bladder more thoroughly due to an enlarged prostate  It may help you urinate less often after bedtime  Medicare Preventive Visit Patient Instructions  Thank you for completing your Welcome to Medicare Visit or Medicare Annual Wellness Visit today  Your next wellness visit will be due in one year (9/8/2023)  The screening/preventive services that you may require over the next 5-10 years are detailed below  Some tests may not apply to you based off risk factors and/or age  Screening tests ordered at today's visit but not completed yet may show as past due  Also, please note that scanned in results may not display below  Preventive Screenings:  Service Recommendations Previous Testing/Comments   Colorectal Cancer Screening  · Colonoscopy    · Fecal Occult Blood Test (FOBT)/Fecal Immunochemical Test (FIT)  · Fecal DNA/Cologuard Test  · Flexible Sigmoidoscopy Age: 39-70 years old   Colonoscopy: every 10 years (May be performed more frequently if at higher risk)  OR  FOBT/FIT: every 1 year  OR  Cologuard: every 3 years  OR  Sigmoidoscopy: every 5 years  Screening may be recommended earlier than age 39 if at higher risk for colorectal cancer  Also, an individualized decision between you and your healthcare provider will decide whether screening between the ages of 74-80 would be appropriate   Colonoscopy: 12/06/2011  FOBT/FIT: Not on file  Cologuard: Not on file  Sigmoidoscopy: Not on file    Risks and Benefits Discussed     Prostate Cancer Screening Individualized decision between patient and health care provider in men between ages of 53-78   Medicare will cover every 12 months beginning on the day after your 50th birthday PSA: 1 5 ng/mL     Screening Current     Hepatitis C Screening Once for adults born between 1945 and 1965  More frequently in patients at high risk for Hepatitis C Hep C Antibody: 03/08/2022    Screening Current   Diabetes Screening 1-2 times per year if you're at risk for diabetes or have pre-diabetes Fasting glucose: 100 mg/dL (3/8/2022)  A1C: 5 7 % (3/8/2022)  Screening Current   Cholesterol Screening Once every 5 years if you don't have a lipid disorder  May order more often based on risk factors  Lipid panel: 03/08/2022  Screening Current      Other Preventive Screenings Covered by Medicare:  1  Abdominal Aortic Aneurysm (AAA) Screening: covered once if your at risk  You're considered to be at risk if you have a family history of AAA or a male between the age of 73-68 who smoking at least 100 cigarettes in your lifetime  2  Lung Cancer Screening: covers low dose CT scan once per year if you meet all of the following conditions: (1) Age 50-69; (2) No signs or symptoms of lung cancer; (3) Current smoker or have quit smoking within the last 15 years; (4) You have a tobacco smoking history of at least 20 pack years (packs per day x number of years you smoked); (5) You get a written order from a healthcare provider  3  Glaucoma Screening: covered annually if you're considered high risk: (1) You have diabetes OR (2) Family history of glaucoma OR (3)  aged 48 and older OR (3)  American aged 72 and older  3  Osteoporosis Screening: covered every 2 years if you meet one of the following conditions: (1) Have a vertebral abnormality; (2) On glucocorticoid therapy for more than 3 months; (3) Have primary hyperparathyroidism; (4) On osteoporosis medications and need to assess response to drug therapy  5  HIV Screening: covered annually if you're between the age of 12-76  Also covered annually if you are younger than 13 and older than 72 with risk factors for HIV infection  For pregnant patients, it is covered up to 3 times per pregnancy      Immunizations:  Immunization Recommendations   Influenza Vaccine Annual influenza vaccination during flu season is recommended for all persons aged >= 6 months who do not have contraindications   Pneumococcal Vaccine   * Pneumococcal conjugate vaccine = PCV13 (Prevnar 13), PCV15 (Vaxneuvance), PCV20 (Prevnar 20)  * Pneumococcal polysaccharide vaccine = PPSV23 (Pneumovax) Adults 2364 years old: 1-3 doses may be recommended based on certain risk factors  Adults 72 years old: 1-2 doses may be recommended based off what pneumonia vaccine you previously received   Hepatitis B Vaccine 3 dose series if at intermediate or high risk (ex: diabetes, end stage renal disease, liver disease)   Tetanus (Td) Vaccine - COST NOT COVERED BY MEDICARE PART B Following completion of primary series, a booster dose should be given every 10 years to maintain immunity against tetanus  Td may also be given as tetanus wound prophylaxis  Tdap Vaccine - COST NOT COVERED BY MEDICARE PART B Recommended at least once for all adults  For pregnant patients, recommended with each pregnancy  Shingles Vaccine (Shingrix) - COST NOT COVERED BY MEDICARE PART B  2 shot series recommended in those aged 48 and above     Health Maintenance Due:      Topic Date Due    Colorectal Cancer Screening  12/06/2021    Hepatitis C Screening  Completed     Immunizations Due:      Topic Date Due    Influenza Vaccine (1) 09/01/2022     Advance Directives   What are advance directives? Advance directives are legal documents that state your wishes and plans for medical care  These plans are made ahead of time in case you lose your ability to make decisions for yourself  Advance directives can apply to any medical decision, such as the treatments you want, and if you want to donate organs  What are the types of advance directives? There are many types of advance directives, and each state has rules about how to use them  You may choose a combination of any of the following:  · Living will: This is a written record of the treatment you want   You can also choose which treatments you do not want, which to limit, and which to stop at a certain time  This includes surgery, medicine, IV fluid, and tube feedings  · Durable power of  for healthcare [facility]): This is a written record that states who you want to make healthcare choices for you when you are unable to make them for yourself  This person, called a proxy, is usually a family member or a friend  You may choose more than 1 proxy  · Do not resuscitate (DNR) order:  A DNR order is used in case your heart stops beating or you stop breathing  It is a request not to have certain forms of treatment, such as CPR  A DNR order may be included in other types of advance directives  · Medical directive: This covers the care that you want if you are in a coma, near death, or unable to make decisions for yourself  You can list the treatments you want for each condition  Treatment may include pain medicine, surgery, blood transfusions, dialysis, IV or tube feedings, and a ventilator (breathing machine)  · Values history: This document has questions about your views, beliefs, and how you feel and think about life  This information can help others choose the care that you would choose  Why are advance directives important? An advance directive helps you control your care  Although spoken wishes may be used, it is better to have your wishes written down  Spoken wishes can be misunderstood, or not followed  Treatments may be given even if you do not want them  An advance directive may make it easier for your family to make difficult choices about your care  Weight Management   Why it is important to manage your weight:  Being overweight increases your risk of health conditions such as heart disease, high blood pressure, type 2 diabetes, and certain types of cancer  It can also increase your risk for osteoarthritis, sleep apnea, and other respiratory problems  Aim for a slow, steady weight loss  Even a small amount of weight loss can lower your risk of health problems    How to lose weight safely:  A safe and healthy way to lose weight is to eat fewer calories and get regular exercise  You can lose up about 1 pound a week by decreasing the number of calories you eat by 500 calories each day  Healthy meal plan for weight management:  A healthy meal plan includes a variety of foods, contains fewer calories, and helps you stay healthy  A healthy meal plan includes the following:  · Eat whole-grain foods more often  A healthy meal plan should contain fiber  Fiber is the part of grains, fruits, and vegetables that is not broken down by your body  Whole-grain foods are healthy and provide extra fiber in your diet  Some examples of whole-grain foods are whole-wheat breads and pastas, oatmeal, brown rice, and bulgur  · Eat a variety of vegetables every day  Include dark, leafy greens such as spinach, kale, yan greens, and mustard greens  Eat yellow and orange vegetables such as carrots, sweet potatoes, and winter squash  · Eat a variety of fruits every day  Choose fresh or canned fruit (canned in its own juice or light syrup) instead of juice  Fruit juice has very little or no fiber  · Eat low-fat dairy foods  Drink fat-free (skim) milk or 1% milk  Eat fat-free yogurt and low-fat cottage cheese  Try low-fat cheeses such as mozzarella and other reduced-fat cheeses  · Choose meat and other protein foods that are low in fat  Choose beans or other legumes such as split peas or lentils  Choose fish, skinless poultry (chicken or turkey), or lean cuts of red meat (beef or pork)  Before you cook meat or poultry, cut off any visible fat  · Use less fat and oil  Try baking foods instead of frying them  Add less fat, such as margarine, sour cream, regular salad dressing and mayonnaise to foods  Eat fewer high-fat foods  Some examples of high-fat foods include french fries, doughnuts, ice cream, and cakes  · Eat fewer sweets  Limit foods and drinks that are high in sugar   This includes candy, cookies, regular soda, and sweetened drinks  Exercise:  Exercise at least 30 minutes per day on most days of the week  Some examples of exercise include walking, biking, dancing, and swimming  You can also fit in more physical activity by taking the stairs instead of the elevator or parking farther away from stores  Ask your healthcare provider about the best exercise plan for you  © Copyright [employer] 2018 Information is for End User's use only and may not be sold, redistributed or otherwise used for commercial purposes   All illustrations and images included in CareNotes® are the copyrighted property of [employer]  or [address] [place] [address]

## 2022-09-07 NOTE — PROGRESS NOTES
Assessment/Plan:    No problem-specific Assessment & Plan notes found for this encounter     htn stable    Prediabetes diet advised    bph with nocturia, trial of flomax, f/u if no better for titration or urology eval     Diagnoses and all orders for this visit:    Medicare annual wellness visit, subsequent    Colon cancer screening    Primary hypertension    Benign prostatic hyperplasia with lower urinary tract symptoms, symptom details unspecified  -     Comprehensive metabolic panel; Future  -     PSA, Total Screen; Future  -     tamsulosin (FLOMAX) 0 4 mg; Take 1 capsule (0 4 mg total) by mouth daily with dinner    Prediabetes  -     Comprehensive metabolic panel; Future  -     Hemoglobin A1C; Future    Essential hypertension  -     amLODIPine (NORVASC) 10 mg tablet; Take 1 tablet (10 mg total) by mouth daily    Screening for cardiovascular, respiratory, and genitourinary diseases  -     Lipid Panel with Direct LDL reflex; Future    Encounter for screening for malignant neoplasm of prostate   -     PSA, Total Screen; Future        Return in about 27 weeks (around 3/15/2023) for Recheck  Subjective:      Patient ID: Sonal Whitlock is a 67 y o  male  Chief Complaint   Patient presents with    Follow-up     On meds  Teena Chisholm MA     Medicare Wellness Visit       HPI  Not dizzy  Eating well  Some exercise  Walking 10 minutes at a time  No cp or sob  Weights used  Not much meat, mostly fish, chicken, vegetable    Nocturia x 10  Does not drink much water  Urologist in the past    The following portions of the patient's history were reviewed and updated as appropriate: allergies, current medications, past family history, past medical history, past social history, past surgical history and problem list     Review of Systems   Respiratory: Negative for shortness of breath  Cardiovascular: Negative for leg swelling           Current Outpatient Medications   Medication Sig Dispense Refill    amLODIPine (NORVASC) 10 mg tablet Take 1 tablet (10 mg total) by mouth daily 90 tablet 1    ibuprofen (MOTRIN) 200 mg tablet Take by mouth every 6 (six) hours as needed for mild pain      tamsulosin (FLOMAX) 0 4 mg Take 1 capsule (0 4 mg total) by mouth daily with dinner 90 capsule 1     No current facility-administered medications for this visit  Objective:    /80   Pulse 69   Temp 98 °F (36 7 °C)   Resp 16   Ht 5' 6" (1 676 m)   Wt 75 3 kg (166 lb)   SpO2 98%   BMI 26 79 kg/m²        Physical Exam  Vitals and nursing note reviewed  Constitutional:       General: He is not in acute distress  Appearance: He is well-developed  He is not ill-appearing  HENT:      Head: Normocephalic  Right Ear: Tympanic membrane normal       Left Ear: Tympanic membrane normal    Eyes:      General: No scleral icterus  Conjunctiva/sclera: Conjunctivae normal    Cardiovascular:      Rate and Rhythm: Normal rate and regular rhythm  Heart sounds: No murmur heard  Pulmonary:      Effort: Pulmonary effort is normal  No respiratory distress  Breath sounds: No wheezing  Abdominal:      General: There is no distension  Palpations: Abdomen is soft  Tenderness: There is no abdominal tenderness  Musculoskeletal:         General: No deformity  Cervical back: Neck supple  Right lower leg: No edema  Left lower leg: No edema  Skin:     General: Skin is warm and dry  Coloration: Skin is not pale  Neurological:      Mental Status: He is alert  Motor: No weakness  Gait: Gait normal    Psychiatric:         Mood and Affect: Mood normal          Behavior: Behavior normal          Thought Content: Thought content normal          BMI Counseling: Body mass index is 26 79 kg/m²  The BMI is above normal  Nutrition recommendations include decreasing portion sizes and moderation in carbohydrate intake  Exercise recommendations include exercising 3-5 times per week   No pharmacotherapy was ordered  Rationale for BMI follow-up plan is due to patient being overweight or obese  Depression Screening and Follow-up Plan: Patient was screened for depression during today's encounter  They screened negative with a PHQ-2 score of 1             Efra Porras DO

## 2022-09-08 NOTE — PROGRESS NOTES
Assessment and Plan:     Problem List Items Addressed This Visit        Active Problems    BPH (benign prostatic hyperplasia)    Relevant Medications    tamsulosin (FLOMAX) 0 4 mg    Other Relevant Orders    Comprehensive metabolic panel    PSA, Total Screen    Hypertension    Relevant Medications    amLODIPine (NORVASC) 10 mg tablet    Colon cancer screening    Medicare annual wellness visit, subsequent - Primary    Screening for cardiovascular, respiratory, and genitourinary diseases    Relevant Orders    Lipid Panel with Direct LDL reflex    Prediabetes    Relevant Orders    Comprehensive metabolic panel    Hemoglobin A1C    Encounter for screening for malignant neoplasm of prostate     Relevant Orders    PSA, Total Screen      Other Visit Diagnoses     Essential hypertension        Relevant Medications    amLODIPine (NORVASC) 10 mg tablet           Preventive health issues were discussed with patient, and age appropriate screening tests were ordered as noted in patient's After Visit Summary  Personalized health advice and appropriate referrals for health education or preventive services given if needed, as noted in patient's After Visit Summary       History of Present Illness:     Patient presents for a Medicare Wellness Visit    HPI   Patient Care Team:  Pauly Zavala DO as PCP - General (Family Medicine)  Pauly Zavala DO as PCP - [address] [place], [place] [address]Th [facility] ([facility])     Review of Systems:     Review of Systems     Problem List:     Patient Active Problem List   Diagnosis    Ureterolithiasis    BPH (benign prostatic hyperplasia)    Hypertension    Colon cancer screening    Adrenal adenoma, left    Liver cyst    Colon, diverticulosis    Chronic pain of left ankle    Medicare annual wellness visit, subsequent    Screening for diabetes mellitus (DM)    Screening for cardiovascular, respiratory, and genitourinary diseases    Immunization due    Prediabetes    Need for hepatitis C screening test    History of tobacco abuse    Prostate cancer screening    Multiple thyroid nodules    Encounter for screening for malignant neoplasm of prostate       Past Medical and Surgical History:     Past Medical History:   Diagnosis Date    BPH (benign prostatic hyperplasia)     Disease of thyroid gland     nodules    Hypertension     Kidney calculi     left    Kidney stone     Nocturia     Urinary incontinence     During the night, urinary frequency during the day  Past Surgical History:   Procedure Laterality Date    EXTRACORPOREAL SHOCK WAVE LITHOTRIPSY      FRACTURE SURGERY Right     ankle with hardware    VT CYSTO/URETERO W/LITHOTRIPSY &INDWELL STENT INSRT Left 2021    Procedure: CYSTOSCOPY URETEROSCOPY WITH LITHOTRIPSY HOLMIUM LASER, RETROGRADE PYELOGRAM AND INSERTION STENT URETERAL;  Surgeon: Lex Nielsen MD;  Location: [address] [place] [address];  Service: Urology    TRANSURETHRAL RESECTION OF [address] 55 THYROID BIOPSY  10/25/2021    US GUIDED THYROID BIOPSY  3/9/2022      Family History:     History reviewed  No pertinent family history  Social History:     Social History     Socioeconomic History    Marital status:      Spouse name: None    Number of children: None    Years of education: None    Highest education level: None   Occupational History    None   Tobacco Use    Smoking status: Former Smoker     Types: Cigarettes     Quit date:      Years since quittin 6    Smokeless tobacco: Never Used   Vaping Use    Vaping Use: Never used   Substance and Sexual Activity    Alcohol use: Yes     Comment: rare    Drug use: No    Sexual activity: None   Other Topics Concern    None   Social History Narrative    None     Social Determinants of Health     Financial Resource Strain: Low Risk     Difficulty of Paying Living Expenses: Not hard at all   Food Insecurity: Not on file   Transportation Needs: No Transportation Needs    Lack of Transportation (Medical):  No    Lack of Transportation (Non-Medical): No   Physical Activity: Not on file   Stress: Not on file   Social Connections: Not on file   Intimate Partner Violence: Not on file   Housing Stability: Not on file      Medications and Allergies:     Current Outpatient Medications   Medication Sig Dispense Refill    amLODIPine (NORVASC) 10 mg tablet Take 1 tablet (10 mg total) by mouth daily 90 tablet 1    ibuprofen (MOTRIN) 200 mg tablet Take by mouth every 6 (six) hours as needed for mild pain      tamsulosin (FLOMAX) 0 4 mg Take 1 capsule (0 4 mg total) by mouth daily with dinner 90 capsule 1     No current facility-administered medications for this visit  No Known Allergies   Immunizations:     Immunization History   Administered Date(s) Administered    COVID-19 PFIZER VACCINE 0 3 ML IM 03/04/2021, 03/25/2021, 03/31/2022    COVID-19 Pfizer vac (Carlos-sucrose, gray cap) 12 yr+ IM 09/27/2021    INFLUENZA 10/07/2021    Influenza Split High Dose Preservative Free IM 09/23/2019    Pneumococcal Conjugate 13-Valent 09/23/2019    Pneumococcal Polysaccharide PPV23 08/18/2021      Health Maintenance:         Topic Date Due    Colorectal Cancer Screening  12/06/2021    Hepatitis C Screening  Completed         Topic Date Due    Influenza Vaccine (1) 09/01/2022      Medicare Screening Tests and Risk Assessments:     Fly Taylor is here for his Subsequent Wellness visit  Last Medicare Wellness visit information reviewed, patient interviewed and updates made to the record today  Health Risk Assessment:   Patient rates overall health as excellent  Patient feels that their physical health rating is much better  Patient is satisfied with their life  Eyesight was rated as slightly worse  Hearing was rated as same  Patient feels that their emotional and mental health rating is much better  Patients states they are never, rarely angry  Patient states they are always unusually tired/fatigued   Pain experienced in the last 7 days has been some  Patient's pain rating has been 6/10  Patient states that he has experienced no weight loss or gain in last 6 months  Depression Screening:   PHQ-2 Score: 1      Fall Risk Screening: In the past year, patient has experienced: no history of falling in past year      Home Safety:  Patient does not have trouble with stairs inside or outside of their home  Patient has no working smoke alarms and has no working carbon monoxide detector  Home safety hazards include: none  Nutrition:   Current diet is Regular  Medications:   Patient is not currently taking any over-the-counter supplements  Patient is able to manage medications  Activities of Daily Living (ADLs)/Instrumental Activities of Daily Living (IADLs):   Walk and transfer into and out of bed and chair?: Yes  Dress and groom yourself?: Yes    Bathe or shower yourself?: Yes    Feed yourself? Yes  Do your laundry/housekeeping?: Yes  Manage your money, pay your bills and track your expenses?: Yes  Make your own meals?: Yes    Do your own shopping?: Yes    Previous Hospitalizations:   Any hospitalizations or ED visits within the last 12 months?: No      Advance Care Planning:   Living will: Yes    Durable POA for healthcare:  Yes    Advanced directive: Yes      Comments: Daughter Marylin Bolaños    Cognitive Screening:   Provider or family/friend/caregiver concerned regarding cognition?: No    PREVENTIVE SCREENINGS      Cardiovascular Screening:    General: Screening Current      Diabetes Screening:     General: Screening Current      Colorectal Cancer Screening:     General: Risks and Benefits Discussed      Prostate Cancer Screening:    General: Screening Current      Osteoporosis Screening:    General: Screening Not Indicated      Abdominal Aortic Aneurysm (AAA) Screening:    Risk factors include: age between 73-69 yo and tobacco use        General: Screening Not Indicated      Lung Cancer Screening:     General: Screening Not Indicated Hepatitis C Screening:    General: Screening Current    Screening, Brief Intervention, and Referral to Treatment (SBIRT)    Screening  Typical number of drinks in a day: 0  Typical number of drinks in a week: 0  Interpretation: Low risk drinking behavior  Single Item Drug Screening:  How often have you used an illegal drug (including marijuana) or a prescription medication for non-medical reasons in the past year? never    Single Item Drug Screen Score: 0  Interpretation: Negative screen for possible drug use disorder    Other Counseling Topics:   Car/seat belt/driving safety and regular weightbearing exercise       No exam data present     Physical Exam:     /80   Pulse 69   Temp 98 °F (36 7 °C)   Resp 16   Ht 5' 6" (1 676 m)   Wt 75 3 kg (166 lb)   SpO2 98%   BMI 26 79 kg/m²     Physical Exam     Julia Champion,

## 2022-09-10 RX ORDER — SODIUM CHLORIDE, SODIUM LACTATE, POTASSIUM CHLORIDE, CALCIUM CHLORIDE 600; 310; 30; 20 MG/100ML; MG/100ML; MG/100ML; MG/100ML
125 INJECTION, SOLUTION INTRAVENOUS CONTINUOUS
Status: CANCELLED | OUTPATIENT
Start: 2022-09-10

## 2022-09-12 ENCOUNTER — HOSPITAL ENCOUNTER (OUTPATIENT)
Dept: GASTROENTEROLOGY | Facility: AMBULARY SURGERY CENTER | Age: 72
Setting detail: OUTPATIENT SURGERY
Discharge: HOME | End: 2022-09-12
Attending: INTERNAL MEDICINE
Payer: COMMERCIAL

## 2022-09-12 ENCOUNTER — ANESTHESIA EVENT (OUTPATIENT)
Dept: GASTROENTEROLOGY | Facility: AMBULARY SURGERY CENTER | Age: 72
End: 2022-09-12

## 2022-09-12 ENCOUNTER — ANESTHESIA (OUTPATIENT)
Dept: GASTROENTEROLOGY | Facility: AMBULARY SURGERY CENTER | Age: 72
End: 2022-09-12

## 2022-09-12 VITALS
OXYGEN SATURATION: 98 % | TEMPERATURE: 96.9 F | DIASTOLIC BLOOD PRESSURE: 87 MMHG | WEIGHT: 170 LBS | HEART RATE: 66 BPM | BODY MASS INDEX: 27.44 KG/M2 | RESPIRATION RATE: 20 BRPM | SYSTOLIC BLOOD PRESSURE: 128 MMHG

## 2022-09-12 DIAGNOSIS — Z86.010 HISTORY OF COLON POLYPS: ICD-10-CM

## 2022-09-12 PROBLEM — IMO0001 SMOKING: Status: ACTIVE | Noted: 2022-09-12

## 2022-09-12 PROBLEM — F17.200 SMOKING: Status: ACTIVE | Noted: 2022-09-12

## 2022-09-12 PROCEDURE — 88305 TISSUE EXAM BY PATHOLOGIST: CPT | Performed by: PATHOLOGY

## 2022-09-12 PROCEDURE — 45385 COLONOSCOPY W/LESION REMOVAL: CPT | Performed by: INTERNAL MEDICINE

## 2022-09-12 PROCEDURE — 45380 COLONOSCOPY AND BIOPSY: CPT | Performed by: INTERNAL MEDICINE

## 2022-09-12 RX ORDER — SODIUM CHLORIDE, SODIUM LACTATE, POTASSIUM CHLORIDE, CALCIUM CHLORIDE 600; 310; 30; 20 MG/100ML; MG/100ML; MG/100ML; MG/100ML
100 INJECTION, SOLUTION INTRAVENOUS CONTINUOUS
Status: CANCELLED | OUTPATIENT
Start: 2022-09-12

## 2022-09-12 RX ORDER — PROPOFOL 10 MG/ML
INJECTION, EMULSION INTRAVENOUS AS NEEDED
Status: DISCONTINUED | OUTPATIENT
Start: 2022-09-12 | End: 2022-09-12

## 2022-09-12 RX ORDER — SODIUM CHLORIDE, SODIUM LACTATE, POTASSIUM CHLORIDE, CALCIUM CHLORIDE 600; 310; 30; 20 MG/100ML; MG/100ML; MG/100ML; MG/100ML
100 INJECTION, SOLUTION INTRAVENOUS CONTINUOUS
Status: DISCONTINUED | OUTPATIENT
Start: 2022-09-12 | End: 2022-09-16 | Stop reason: HOSPADM

## 2022-09-12 RX ORDER — LIDOCAINE HYDROCHLORIDE 10 MG/ML
INJECTION, SOLUTION EPIDURAL; INFILTRATION; INTRACAUDAL; PERINEURAL AS NEEDED
Status: DISCONTINUED | OUTPATIENT
Start: 2022-09-12 | End: 2022-09-12

## 2022-09-12 RX ORDER — PROPOFOL 10 MG/ML
INJECTION, EMULSION INTRAVENOUS CONTINUOUS PRN
Status: DISCONTINUED | OUTPATIENT
Start: 2022-09-12 | End: 2022-09-12

## 2022-09-12 RX ADMIN — SODIUM CHLORIDE, SODIUM LACTATE, POTASSIUM CHLORIDE, AND CALCIUM CHLORIDE 100 ML/HR: .6; .31; .03; .02 INJECTION, SOLUTION INTRAVENOUS at 07:22

## 2022-09-12 RX ADMIN — PROPOFOL 100 MCG/KG/MIN: 10 INJECTION, EMULSION INTRAVENOUS at 07:55

## 2022-09-12 RX ADMIN — PROPOFOL 100 MG: 10 INJECTION, EMULSION INTRAVENOUS at 07:55

## 2022-09-12 RX ADMIN — LIDOCAINE HYDROCHLORIDE 50 MG: 10 INJECTION, SOLUTION EPIDURAL; INFILTRATION; INTRACAUDAL; PERINEURAL at 07:55

## 2022-09-12 NOTE — H&P
History and Physical - [facility]  Mary Ann Hamilton 67 y o  male MRN: [id number]                  HPI: Mary Ann Hamilton is a 67y o  year old male who presents for surveillance of prior colon polyps      REVIEW OF SYSTEMS: Per the HPI, and otherwise unremarkable  Historical Information   Past Medical History:   Diagnosis Date    Arthritis     shoulder    BPH (benign prostatic hyperplasia)     Disease of thyroid gland     nodules    Hypertension     Kidney calculi     left    Kidney stone     Nocturia     Urinary incontinence     During the night, urinary frequency during the day   Wears glasses      Past Surgical History:   Procedure Laterality Date    COLONOSCOPY      EXTRACORPOREAL SHOCK WAVE LITHOTRIPSY      FRACTURE SURGERY Right     ankle with hardware    [place] CYSTO/URETERO W/LITHOTRIPSY &INDWELL STENT INSRT Left 2021    Procedure: CYSTOSCOPY URETEROSCOPY WITH LITHOTRIPSY HOLMIUM LASER, RETROGRADE PYELOGRAM AND INSERTION STENT URETERAL;  Surgeon: Shannen Naik MD;  Location: [address] [place] [address];  Service: Urology    TRANSURETHRAL RESECTION OF PROSTATE      US GUIDED THYROID BIOPSY  10/25/2021    US GUIDED THYROID BIOPSY  2022     Social History   Social History     Substance and Sexual Activity   Alcohol Use Yes    Comment: rare     Social History     Substance and Sexual Activity   Drug Use No     Social History     Tobacco Use   Smoking Status Former Smoker    Types: Cigarettes    Quit date:     Years since quittin 7   Smokeless Tobacco Never Used     History reviewed  No pertinent family history      Meds/Allergies       Current Outpatient Medications:     amLODIPine (NORVASC) 10 mg tablet    ibuprofen (MOTRIN) 200 mg tablet    tamsulosin (FLOMAX) 0 4 mg    Current Facility-Administered Medications:     lactated ringers infusion, 100 mL/hr, Intravenous, Continuous, 100 mL/hr at 22 0722    No Known Allergies    Objective     /81   Pulse 75   Temp (!) 96 9 °F (36 1 °C) (Temporal)   Resp 18   Wt 77 1 kg (170 lb)   SpO2 99%   BMI 27 44 kg/m²       PHYSICAL EXAM    Gen: NAD  Head: NCAT  CV: RRR  CHEST: Clear  ABD: soft, NT/ND  EXT: no edema      ASSESSMENT/PLAN:  This is a 67y o  year old male here for colonoscopy, and he is stable and optimized for his procedure

## 2022-09-12 NOTE — ANESTHESIA POSTPROCEDURE EVALUATION
Post-Op Assessment Note    CV Status:  Stable  Pain Score: 0    Pain management: adequate     Mental Status:  Sleepy   Hydration Status:  Stable   PONV Controlled:  None   Airway Patency:  Patent and adequate      Post Op Vitals Reviewed: Yes      Staff: CRNA         No complications documented      BP      Temp     Pulse     Resp      SpO2

## 2022-09-12 NOTE — ANESTHESIA PREPROCEDURE EVALUATION
Procedure:  COLONOSCOPY    Relevant Problems   CARDIO   (+) Hypertension      GI/HEPATIC   (+) Liver cyst      /RENAL   (+) BPH (benign prostatic hyperplasia)      NEURO/PSYCH   (+) Chronic pain of left ankle      PULMONARY   (+) Smoking,         Physical Exam    Airway    Mallampati score: II  TM Distance: >3 FB  Neck ROM: full     Dental       Cardiovascular  Cardiovascular exam normal    Pulmonary  Pulmonary exam normal     Other Findings        Anesthesia Plan  ASA Score- 2     Anesthesia Type- IV sedation with anesthesia with ASA Monitors  Additional Monitors:   Airway Plan:           Plan Factors-    Chart reviewed  Patient summary reviewed  Induction- intravenous  Postoperative Plan-     Informed Consent- Anesthetic plan and risks discussed with patient  I personally reviewed this patient with the CRNA  Discussed and agreed on the Anesthesia Plan with the CRNA  Karrie Nissen

## 2022-09-13 PROBLEM — Z86.010 PERSONAL HISTORY OF COLONIC POLYPS: Status: ACTIVE | Noted: 2022-09-13

## 2022-09-13 PROBLEM — Z86.0100 PERSONAL HISTORY OF COLONIC POLYPS: Status: ACTIVE | Noted: 2022-09-13

## 2022-09-19 PROCEDURE — 88305 TISSUE EXAM BY PATHOLOGIST: CPT | Performed by: PATHOLOGY

## 2022-09-28 NOTE — RESULT ENCOUNTER NOTE
Please call the patient regarding his result  Polyps were mostly adenomatous but benign    Repeat colonoscopy in 3 years

## 2022-10-12 PROBLEM — Z11.59 NEED FOR HEPATITIS C SCREENING TEST: Status: RESOLVED | Noted: 2021-08-18 | Resolved: 2022-10-12

## 2022-10-12 PROBLEM — Z13.89 SCREENING FOR CARDIOVASCULAR, RESPIRATORY, AND GENITOURINARY DISEASES: Status: RESOLVED | Noted: 2021-08-18 | Resolved: 2022-10-12

## 2022-10-12 PROBLEM — Z13.1 SCREENING FOR DIABETES MELLITUS (DM): Status: RESOLVED | Noted: 2021-08-18 | Resolved: 2022-10-12

## 2022-10-12 PROBLEM — Z00.00 MEDICARE ANNUAL WELLNESS VISIT, SUBSEQUENT: Status: RESOLVED | Noted: 2021-08-18 | Resolved: 2022-10-12

## 2022-10-12 PROBLEM — Z12.5 PROSTATE CANCER SCREENING: Status: RESOLVED | Noted: 2021-08-18 | Resolved: 2022-10-12

## 2022-10-12 PROBLEM — Z13.83 SCREENING FOR CARDIOVASCULAR, RESPIRATORY, AND GENITOURINARY DISEASES: Status: RESOLVED | Noted: 2021-08-18 | Resolved: 2022-10-12

## 2022-10-12 PROBLEM — Z13.6 SCREENING FOR CARDIOVASCULAR, RESPIRATORY, AND GENITOURINARY DISEASES: Status: RESOLVED | Noted: 2021-08-18 | Resolved: 2022-10-12

## 2022-10-12 PROBLEM — Z12.11 COLON CANCER SCREENING: Status: RESOLVED | Noted: 2021-07-21 | Resolved: 2022-10-12

## 2022-11-06 PROBLEM — Z12.5 ENCOUNTER FOR SCREENING FOR MALIGNANT NEOPLASM OF PROSTATE: Status: RESOLVED | Noted: 2022-09-07 | Resolved: 2022-11-06

## 2022-11-14 DIAGNOSIS — I10 ESSENTIAL HYPERTENSION: ICD-10-CM

## 2022-11-14 RX ORDER — AMLODIPINE BESYLATE 10 MG/1
10 TABLET ORAL DAILY
Qty: 90 TABLET | Refills: 0 | Status: SHIPPED | OUTPATIENT
Start: 2022-11-14

## 2022-12-09 ENCOUNTER — LAB (OUTPATIENT)
Dept: LAB | Facility: HOSPITAL | Age: 72
End: 2022-12-09

## 2022-12-09 DIAGNOSIS — R94.7 ABNORMAL DEXAMETHASONE SUPPRESSION TEST: ICD-10-CM

## 2022-12-09 DIAGNOSIS — G89.29 CHRONIC RIGHT SHOULDER PAIN: ICD-10-CM

## 2022-12-09 DIAGNOSIS — M25.50 DIFFUSE ARTHRALGIA: ICD-10-CM

## 2022-12-09 DIAGNOSIS — M10.9 GOUT, UNSPECIFIED CAUSE, UNSPECIFIED CHRONICITY, UNSPECIFIED SITE: ICD-10-CM

## 2022-12-09 DIAGNOSIS — Z11.59 NEED FOR HEPATITIS B SCREENING TEST: ICD-10-CM

## 2022-12-09 DIAGNOSIS — E55.9 VITAMIN D DEFICIENCY: ICD-10-CM

## 2022-12-09 DIAGNOSIS — M25.511 CHRONIC RIGHT SHOULDER PAIN: ICD-10-CM

## 2022-12-09 LAB
25(OH)D3 SERPL-MCNC: 14.3 NG/ML (ref 30–100)
CK SERPL-CCNC: 93 U/L (ref 39–308)
CRP SERPL QL: 1 MG/L
ERYTHROCYTE [SEDIMENTATION RATE] IN BLOOD: 7 MM/HOUR (ref 0–19)
FERRITIN SERPL-MCNC: 128 NG/ML (ref 8–388)
FSH SERPL-ACNC: 7.7 MIU/ML (ref 0.7–10.8)
HBV CORE AB SER QL: NORMAL
HBV SURFACE AG SER QL: NORMAL
LH SERPL-ACNC: 5.4 MIU/ML (ref 1.2–10.6)
PROLACTIN SERPL-MCNC: 13.3 NG/ML (ref 2.5–17.4)
RHEUMATOID FACT SER QL LA: NEGATIVE
T4 FREE SERPL-MCNC: 0.96 NG/DL (ref 0.76–1.46)
TSH SERPL DL<=0.05 MIU/L-ACNC: 0.3 UIU/ML (ref 0.45–4.5)
URATE SERPL-MCNC: 4.4 MG/DL (ref 3.5–8.5)

## 2022-12-10 LAB
ENA SS-A AB SER-ACNC: <0.2 AI (ref 0–0.9)
ENA SS-B AB SER-ACNC: <0.2 AI (ref 0–0.9)

## 2022-12-12 LAB
ANA SPECKLED TITR SER: NORMAL {TITER}
ANA TITR SER IF: POSITIVE {TITER}
IGF-I SERPL-MCNC: 95 NG/ML (ref 53–222)
SL AMB NOTE:: NORMAL

## 2022-12-16 LAB — CCP AB SER IA-ACNC: 0.4

## 2022-12-18 ENCOUNTER — TELEPHONE (OUTPATIENT)
Dept: RHEUMATOLOGY | Facility: CLINIC | Age: 72
End: 2022-12-18

## 2022-12-18 DIAGNOSIS — R76.8 POSITIVE ANA (ANTINUCLEAR ANTIBODY): ICD-10-CM

## 2022-12-18 DIAGNOSIS — E55.9 VITAMIN D DEFICIENCY: Primary | ICD-10-CM

## 2022-12-18 RX ORDER — ERGOCALCIFEROL 1.25 MG/1
50000 CAPSULE ORAL WEEKLY
Qty: 4 CAPSULE | Refills: 0 | Status: SHIPPED | OUTPATIENT
Start: 2022-12-18

## 2022-12-18 NOTE — TELEPHONE ENCOUNTER
Please let patient know the Vit D levels are low so I sent in a high dose weekly supplement to the pharmacy  The specific arthritis testing was all normal except for one of the antibodies came back slightly positive which I suspect is a false positive, so I would like to add on some extra blood work to check this  Orders will be in chart

## 2022-12-19 LAB — HLA-B27 QL NAA+PROBE: NORMAL

## 2022-12-23 ENCOUNTER — APPOINTMENT (EMERGENCY)
Dept: RADIOLOGY | Facility: HOSPITAL | Age: 72
End: 2022-12-23

## 2022-12-23 ENCOUNTER — HOSPITAL ENCOUNTER (INPATIENT)
Facility: HOSPITAL | Age: 72
LOS: 1 days | Discharge: HOME | End: 2022-12-24
Attending: EMERGENCY MEDICINE | Admitting: INTERNAL MEDICINE

## 2022-12-23 ENCOUNTER — APPOINTMENT (INPATIENT)
Dept: NON INVASIVE DIAGNOSTICS | Facility: HOSPITAL | Age: 72
End: 2022-12-23

## 2022-12-23 DIAGNOSIS — U07.1 COVID-19: ICD-10-CM

## 2022-12-23 DIAGNOSIS — I48.91 ATRIAL FIBRILLATION WITH RAPID VENTRICULAR RESPONSE (HCC): Primary | ICD-10-CM

## 2022-12-23 DIAGNOSIS — I50.9 CHF (CONGESTIVE HEART FAILURE) (HCC): ICD-10-CM

## 2022-12-23 PROBLEM — R79.89 ELEVATED D-DIMER: Status: ACTIVE | Noted: 2022-12-23

## 2022-12-23 PROBLEM — N17.9 AKI (ACUTE KIDNEY INJURY) (HCC): Status: ACTIVE | Noted: 2022-12-23

## 2022-12-23 LAB
2HR DELTA HS TROPONIN: 0 NG/L
ALBUMIN SERPL BCP-MCNC: 3.2 G/DL (ref 3.5–5)
ALP SERPL-CCNC: 62 U/L (ref 46–116)
ALT SERPL W P-5'-P-CCNC: 34 U/L (ref 12–78)
ANION GAP SERPL CALCULATED.3IONS-SCNC: 12 MMOL/L (ref 4–13)
AORTIC ROOT: 3.6 CM
APICAL FOUR CHAMBER EJECTION FRACTION: 54 %
APTT PPP: 26 SECONDS (ref 23–37)
ASCENDING AORTA: 3.4 CM
AST SERPL W P-5'-P-CCNC: 27 U/L (ref 5–45)
BASOPHILS # BLD AUTO: 0.03 THOUSANDS/ÂΜL (ref 0–0.1)
BASOPHILS NFR BLD AUTO: 0 % (ref 0–1)
BILIRUB SERPL-MCNC: 0.64 MG/DL (ref 0.2–1)
BILIRUB UR QL STRIP: NEGATIVE
BUN SERPL-MCNC: 24 MG/DL (ref 5–25)
CALCIUM ALBUM COR SERPL-MCNC: 9.6 MG/DL (ref 8.3–10.1)
CALCIUM SERPL-MCNC: 9 MG/DL (ref 8.3–10.1)
CARDIAC TROPONIN I PNL SERPL HS: 10 NG/L
CARDIAC TROPONIN I PNL SERPL HS: 10 NG/L
CHLORIDE SERPL-SCNC: 107 MMOL/L (ref 96–108)
CLARITY UR: CLEAR
CO2 SERPL-SCNC: 26 MMOL/L (ref 21–32)
COLOR UR: YELLOW
CREAT SERPL-MCNC: 1.41 MG/DL (ref 0.6–1.3)
CRP SERPL QL: 5.1 MG/L
D DIMER PPP FEU-MCNC: 0.84 UG/ML FEU
E WAVE DECELERATION TIME: 291 MS
EOSINOPHIL # BLD AUTO: 0.01 THOUSAND/ÂΜL (ref 0–0.61)
EOSINOPHIL NFR BLD AUTO: 0 % (ref 0–6)
ERYTHROCYTE [DISTWIDTH] IN BLOOD BY AUTOMATED COUNT: 14.2 % (ref 11.6–15.1)
EST. AVERAGE GLUCOSE BLD GHB EST-MCNC: 117 MG/DL
FLUAV RNA RESP QL NAA+PROBE: NEGATIVE
FLUBV RNA RESP QL NAA+PROBE: NEGATIVE
FRACTIONAL SHORTENING: 34 % (ref 28–44)
GFR SERPL CREATININE-BSD FRML MDRD: 49 ML/MIN/1.73SQ M
GLUCOSE SERPL-MCNC: 109 MG/DL (ref 65–140)
GLUCOSE UR STRIP-MCNC: NEGATIVE MG/DL
HBA1C MFR BLD: 5.7 %
HCT VFR BLD AUTO: 51.3 % (ref 36.5–49.3)
HGB BLD-MCNC: 16.8 G/DL (ref 12–17)
HGB UR QL STRIP.AUTO: NEGATIVE
IMM GRANULOCYTES # BLD AUTO: 0.05 THOUSAND/UL (ref 0–0.2)
IMM GRANULOCYTES NFR BLD AUTO: 1 % (ref 0–2)
INR PPP: 0.96 (ref 0.84–1.19)
INTERVENTRICULAR SEPTUM IN DIASTOLE (PARASTERNAL SHORT AXIS VIEW): 0.9 CM
INTERVENTRICULAR SEPTUM: 0.9 CM (ref 0.6–1.1)
KETONES UR STRIP-MCNC: NEGATIVE MG/DL
LAAS-AP2: 15.7 CM2
LAAS-AP4: 16 CM2
LEFT ATRIUM SIZE: 4.3 CM
LEFT INTERNAL DIMENSION IN SYSTOLE: 3.1 CM (ref 2.1–4)
LEFT VENTRICULAR INTERNAL DIMENSION IN DIASTOLE: 4.7 CM (ref 3.5–6)
LEFT VENTRICULAR POSTERIOR WALL IN END DIASTOLE: 1 CM
LEFT VENTRICULAR STROKE VOLUME: 67 ML
LEUKOCYTE ESTERASE UR QL STRIP: NEGATIVE
LVSV (TEICH): 67 ML
LYMPHOCYTES # BLD AUTO: 2.52 THOUSANDS/ÂΜL (ref 0.6–4.47)
LYMPHOCYTES NFR BLD AUTO: 26 % (ref 14–44)
MAGNESIUM SERPL-MCNC: 2.1 MG/DL (ref 1.6–2.6)
MCH RBC QN AUTO: 32.1 PG (ref 26.8–34.3)
MCHC RBC AUTO-ENTMCNC: 32.7 G/DL (ref 31.4–37.4)
MCV RBC AUTO: 98 FL (ref 82–98)
MONOCYTES # BLD AUTO: 0.92 THOUSAND/ÂΜL (ref 0.17–1.22)
MONOCYTES NFR BLD AUTO: 10 % (ref 4–12)
MV E'TISSUE VEL-SEP: 3 CM/S
MV PEAK A VEL: 0.89 M/S
MV PEAK E VEL: 35 CM/S
MV STENOSIS PRESSURE HALF TIME: 84 MS
MV VALVE AREA P 1/2 METHOD: 2.62 CM2
NEUTROPHILS # BLD AUTO: 6.03 THOUSANDS/ÂΜL (ref 1.85–7.62)
NEUTS SEG NFR BLD AUTO: 63 % (ref 43–75)
NITRITE UR QL STRIP: NEGATIVE
NRBC BLD AUTO-RTO: 0 /100 WBCS
NT-PROBNP SERPL-MCNC: 1655 PG/ML
PH UR STRIP.AUTO: 6 [PH]
PLATELET # BLD AUTO: 191 THOUSANDS/UL (ref 149–390)
PMV BLD AUTO: 9.2 FL (ref 8.9–12.7)
POTASSIUM SERPL-SCNC: 3.3 MMOL/L (ref 3.5–5.3)
PROCALCITONIN SERPL-MCNC: 0.13 NG/ML
PROT SERPL-MCNC: 6.8 G/DL (ref 6.4–8.4)
PROT UR STRIP-MCNC: NEGATIVE MG/DL
PROTHROMBIN TIME: 12.9 SECONDS (ref 11.6–14.5)
RA PRESSURE ESTIMATED: 8 MMHG
RBC # BLD AUTO: 5.24 MILLION/UL (ref 3.88–5.62)
RIGHT ATRIUM AREA SYSTOLE A4C: 10.4 CM2
RIGHT VENTRICLE ID DIMENSION: 3.3 CM
RSV RNA RESP QL NAA+PROBE: NEGATIVE
RV PSP: 22 MMHG
SARS-COV-2 RNA RESP QL NAA+PROBE: POSITIVE
SL CV LEFT ATRIUM LENGTH A2C: 5.4 CM
SL CV LV EF: 60
SL CV PED ECHO LEFT VENTRICLE DIASTOLIC VOLUME (MOD BIPLANE) 2D: 104 ML
SL CV PED ECHO LEFT VENTRICLE SYSTOLIC VOLUME (MOD BIPLANE) 2D: 36 ML
SODIUM SERPL-SCNC: 145 MMOL/L (ref 135–147)
SP GR UR STRIP.AUTO: 1.01 (ref 1–1.03)
TR MAX PG: 14 MMHG
TR PEAK VELOCITY: 1.8 M/S
TRICUSPID VALVE PEAK REGURGITATION VELOCITY: 1.84 M/S
TSH SERPL DL<=0.05 MIU/L-ACNC: 0.89 UIU/ML (ref 0.45–4.5)
UROBILINOGEN UR QL STRIP.AUTO: 0.2 E.U./DL
WBC # BLD AUTO: 9.56 THOUSAND/UL (ref 4.31–10.16)

## 2022-12-23 PROCEDURE — XW033E5 INTRODUCTION OF REMDESIVIR ANTI-INFECTIVE INTO PERIPHERAL VEIN, PERCUTANEOUS APPROACH, NEW TECHNOLOGY GROUP 5: ICD-10-PCS | Performed by: INTERNAL MEDICINE

## 2022-12-23 RX ORDER — POTASSIUM CHLORIDE 20 MEQ/1
40 TABLET, EXTENDED RELEASE ORAL ONCE
Status: COMPLETED | OUTPATIENT
Start: 2022-12-23 | End: 2022-12-23

## 2022-12-23 RX ORDER — POTASSIUM CHLORIDE 20 MEQ/1
20 TABLET, EXTENDED RELEASE ORAL ONCE
Status: COMPLETED | OUTPATIENT
Start: 2022-12-23 | End: 2022-12-23

## 2022-12-23 RX ORDER — HEPARIN SODIUM 5000 [USP'U]/ML
5000 INJECTION, SOLUTION INTRAVENOUS; SUBCUTANEOUS EVERY 8 HOURS SCHEDULED
Status: DISCONTINUED | OUTPATIENT
Start: 2022-12-23 | End: 2022-12-23

## 2022-12-23 RX ORDER — ASPIRIN 325 MG
325 TABLET ORAL ONCE
Status: COMPLETED | OUTPATIENT
Start: 2022-12-23 | End: 2022-12-23

## 2022-12-23 RX ORDER — SODIUM CHLORIDE, SODIUM LACTATE, POTASSIUM CHLORIDE, CALCIUM CHLORIDE 600; 310; 30; 20 MG/100ML; MG/100ML; MG/100ML; MG/100ML
125 INJECTION, SOLUTION INTRAVENOUS CONTINUOUS
Status: DISCONTINUED | OUTPATIENT
Start: 2022-12-23 | End: 2022-12-24

## 2022-12-23 RX ORDER — SODIUM CHLORIDE, SODIUM LACTATE, POTASSIUM CHLORIDE, CALCIUM CHLORIDE 600; 310; 30; 20 MG/100ML; MG/100ML; MG/100ML; MG/100ML
125 INJECTION, SOLUTION INTRAVENOUS CONTINUOUS
Status: DISCONTINUED | OUTPATIENT
Start: 2022-12-23 | End: 2022-12-23 | Stop reason: SDUPTHER

## 2022-12-23 RX ORDER — TAMSULOSIN HYDROCHLORIDE 0.4 MG/1
0.4 CAPSULE ORAL
Status: DISCONTINUED | OUTPATIENT
Start: 2022-12-23 | End: 2022-12-24 | Stop reason: HOSPADM

## 2022-12-23 RX ORDER — DILTIAZEM HYDROCHLORIDE 5 MG/ML
INJECTION INTRAVENOUS
Status: COMPLETED
Start: 2022-12-23 | End: 2022-12-23

## 2022-12-23 RX ORDER — DILTIAZEM HYDROCHLORIDE 5 MG/ML
15 INJECTION INTRAVENOUS ONCE
Status: COMPLETED | OUTPATIENT
Start: 2022-12-23 | End: 2022-12-23

## 2022-12-23 RX ORDER — FUROSEMIDE 10 MG/ML
20 INJECTION INTRAMUSCULAR; INTRAVENOUS ONCE
Status: COMPLETED | OUTPATIENT
Start: 2022-12-23 | End: 2022-12-23

## 2022-12-23 RX ADMIN — METOPROLOL TARTRATE 12.5 MG: 25 TABLET, FILM COATED ORAL at 20:52

## 2022-12-23 RX ADMIN — POTASSIUM CHLORIDE 20 MEQ: 1500 TABLET, EXTENDED RELEASE ORAL at 18:03

## 2022-12-23 RX ADMIN — SODIUM CHLORIDE, SODIUM LACTATE, POTASSIUM CHLORIDE, AND CALCIUM CHLORIDE 125 ML/HR: .6; .31; .03; .02 INJECTION, SOLUTION INTRAVENOUS at 20:53

## 2022-12-23 RX ADMIN — FUROSEMIDE 20 MG: 10 INJECTION, SOLUTION INTRAMUSCULAR; INTRAVENOUS at 14:40

## 2022-12-23 RX ADMIN — DILTIAZEM HYDROCHLORIDE 15 MG: 5 INJECTION INTRAVENOUS at 11:20

## 2022-12-23 RX ADMIN — METOPROLOL TARTRATE 12.5 MG: 25 TABLET, FILM COATED ORAL at 14:40

## 2022-12-23 RX ADMIN — ASPIRIN 325 MG: 325 TABLET ORAL at 12:48

## 2022-12-23 RX ADMIN — REMDESIVIR 200 MG: 100 INJECTION, POWDER, LYOPHILIZED, FOR SOLUTION INTRAVENOUS at 17:52

## 2022-12-23 RX ADMIN — TAMSULOSIN HYDROCHLORIDE 0.4 MG: 0.4 CAPSULE ORAL at 18:03

## 2022-12-23 RX ADMIN — SODIUM CHLORIDE 500 ML: 0.9 INJECTION, SOLUTION INTRAVENOUS at 11:30

## 2022-12-23 RX ADMIN — APIXABAN 5 MG: 5 TABLET, FILM COATED ORAL at 18:03

## 2022-12-23 RX ADMIN — IOHEXOL 85 ML: 350 INJECTION, SOLUTION INTRAVENOUS at 12:11

## 2022-12-23 RX ADMIN — POTASSIUM CHLORIDE 40 MEQ: 1500 TABLET, EXTENDED RELEASE ORAL at 14:36

## 2022-12-23 NOTE — ASSESSMENT & PLAN NOTE
Baseline Cr 0 9-1 1  Cr elevated at 1 41 today  Poor oral intake, likely pre-renal  Gentle IV hydration  Urinalysis negative  Repeat BMP in AM  Consider renal ultrasound if no improvement given history of kidney stones

## 2022-12-23 NOTE — ASSESSMENT & PLAN NOTE
Patient presented with shortness of breath with exertion for a few days, acutely worsening today  He feels much more short of breath at night when laying supine  CT revealed mild pulmonary septal thickening, consistent with mild CHF, slow transit of contrast consistent with cardiac failure  TSH negative  BNP 1655  Troponin negative  Patient converted to NSR in the ER with a dose of cardizem     • Admit to telemetry  • Echocardiogram  • Cardiology consultation

## 2022-12-23 NOTE — PLAN OF CARE
Problem: PAIN - ADULT  Goal: Verbalizes/displays adequate comfort level or baseline comfort level  Description: Interventions:  - Encourage patient to monitor pain and request assistance  - Assess pain using appropriate pain scale  - Administer analgesics based on type and severity of pain and evaluate response  - Implement non-pharmacological measures as appropriate and evaluate response  - Consider cultural and social influences on pain and pain management  - Notify physician/advanced practitioner if interventions unsuccessful or patient reports new pain  Outcome: Progressing     Problem: INFECTION - ADULT  Goal: Absence or prevention of progression during hospitalization  Description: INTERVENTIONS:  - Assess and monitor for signs and symptoms of infection  - Monitor lab/diagnostic results  - Monitor all insertion sites, i e  indwelling lines, tubes, and drains  - Monitor endotracheal if appropriate and nasal secretions for changes in amount and color  - Gypsy appropriate cooling/warming therapies per order  - Administer medications as ordered  - Instruct and encourage patient and family to use good hand hygiene technique  - Identify and instruct in appropriate isolation precautions for identified infection/condition  Outcome: Progressing  Goal: Absence of fever/infection during neutropenic period  Description: INTERVENTIONS:  - Monitor WBC    Outcome: Progressing     Problem: SAFETY ADULT  Goal: Patient will remain free of falls  Description: INTERVENTIONS:  - Educate patient/family on patient safety including physical limitations  - Instruct patient to call for assistance with activity   - Consult OT/PT to assist with strengthening/mobility   - Keep Call bell within reach  - Keep bed low and locked with side rails adjusted as appropriate  - Keep care items and personal belongings within reach  - Initiate and maintain comfort rounds  - Make Fall Risk Sign visible to staff  - Offer Toileting every   Hours, in advance of need  - Initiate/Maintain n alarm  - Obtain necessary fall risk management equipment:    - Apply yellow socks and bracelet for high fall risk patients  - Consider moving patient to room near nurses station  Outcome: Progressing  Goal: Maintain or return to baseline ADL function  Description: INTERVENTIONS:  -  Assess patient's ability to carry out ADLs; assess patient's baseline for ADL function and identify physical deficits which impact ability to perform ADLs (bathing, care of mouth/teeth, toileting, grooming, dressing, etc )  - Assess/evaluate cause of self-care deficits   - Assess range of motion  - Assess patient's mobility; develop plan if impaired  - Assess patient's need for assistive devices and provide as appropriate  - Encourage maximum independence but intervene and supervise when necessary  - Involve family in performance of ADLs  - Assess for home care needs following discharge   - Consider OT consult to assist with ADL evaluation and planning for discharge  - Provide patient education as appropriate  Outcome: Progressing  Goal: Maintains/Returns to pre admission functional level  Description: INTERVENTIONS:  - Perform BMAT or MOVE assessment daily    - Set and communicate daily mobility goal to care team and patient/family/caregiver  - Collaborate with rehabilitation services on mobility goals if consulted  - Perform Range of Motion times a day  - Reposition patient every   hours    - Dangle patient   times a day  - Stand patient   times a day  - Ambulate patient   times a day  - Out of bed to chair   times a day   - Out of bed for meals   times a day  - Out of bed for toileting  - Record patient progress and toleration of activity level   Outcome: Progressing     Problem: DISCHARGE PLANNING  Goal: Discharge to home or other facility with appropriate resources  Description: INTERVENTIONS:  - Identify barriers to discharge w/patient and caregiver  - Arrange for needed discharge resources and transportation as appropriate  - Identify discharge learning needs (meds, wound care, etc )  - Arrange for interpretive services to assist at discharge as needed  - Refer to Case Management Department for coordinating discharge planning if the patient needs post-hospital services based on physician/advanced practitioner order or complex needs related to functional status, cognitive ability, or social support system  Outcome: Progressing     Problem: RESPIRATORY - ADULT  Goal: Achieves optimal ventilation and oxygenation  Description: INTERVENTIONS:  - Assess for changes in respiratory status  - Assess for changes in mentation and behavior  - Position to facilitate oxygenation and minimize respiratory effort  - Oxygen administered by appropriate delivery if ordered  - Initiate smoking cessation education as indicated  - Encourage broncho-pulmonary hygiene including cough, deep breathe, Incentive Spirometry  - Assess the need for suctioning and aspirate as needed  - Assess and instruct to report SOB or any respiratory difficulty  - Respiratory Therapy support as indicated  Outcome: Progressing

## 2022-12-23 NOTE — CONSULTS
Consultation - Cardiology   Clarisa Frank 67 y o  male MRN: [id number]  Unit/Bed#: ED CT1 Encounter: [id number]    Assessment/Plan     Assessment:  1  Paroxysmal Atrial fibrllation of unknown duration  2  COVID 19 Viral infection  3  Hypertension  4  Volume Overload      Plan:  Patient has been admitted to the hospitalist service  1   Echo ordered by primary team to evaluate cardiac function and structure    2  Will add low dose Lopressor 12 5 mg q12h  Monitor telemetry    3  [id number] = 3, or 3 2% risk of CVA per year  Will start Eliquis 5 mg bid for thrombotic protection  4   Will give single dose of Lasix 20 mg IV and monitor response  5   COVID management per primary team    6  Further orders as patient condition and testing warrant      History of Present Illness   Physician Requesting Consult: Matt Luna MD  Reason for Consult / Principal Problem: PAF in the setting of COVID Viral Infection      HPI: Clarisa Frank is a 67y o  year old male who presented to the ED with few month history of Shortness of breath  Now with generalized weakness, fatigue and malaise  Screened and found positive for COVID Viral Infection  Patient was placed on the monitor and noted be to in a Rapid Atrial fibrillation of unknown duration  Patient is also noted to have a RBBB  Previous EKG from 2015 did not demonstrate RBBB  Patient has had no previous cardiac testing  He follows with Dr Wen Montana for history of Hypertension, BPH, and Impaired fasting glucose  CT performed in the ED to rule out PE demonstrated no PE, but mild pulmonary interlobular septal thickening likely secondary to mild CHF; cardiomegaly and slow transit of the IV contrast bolus through the cardiac chambers indicates cardiac failure  Echo is pending  Basil notes that since converting back to sinus rhythm, his breathing seems to have improved      Inpatient consult to Cardiology  Consult performed by: Stephen Marcum EDISONNP  Consult ordered by: SARA Saavedra          Review of Systems   Constitutional: Negative  Negative for activity change, fatigue and unexpected weight change  HENT: Negative for congestion, facial swelling, postnasal drip, sneezing and trouble swallowing  Eyes: Negative  Negative for photophobia and visual disturbance  Respiratory: Positive for cough and shortness of breath  Cardiovascular: Negative  Negative for chest pain, palpitations and leg swelling  Gastrointestinal: Negative  Negative for abdominal distention  Endocrine: Negative  Negative for polydipsia, polyphagia and polyuria  Genitourinary: Negative  Negative for difficulty urinating  Musculoskeletal: Negative  Skin: Negative  Neurological: Negative  Negative for dizziness, syncope, weakness and light-headedness  Hematological: Negative  Psychiatric/Behavioral: Negative  Historical Information   Past Medical History:   Diagnosis Date   • Arthritis     shoulder   • BPH (benign prostatic hyperplasia)    • Disease of thyroid gland     nodules   • Hypertension    • Kidney calculi     left   • Kidney stone    • Nocturia    • Urinary incontinence     During the night, urinary frequency during the day     • Wears glasses      Past Surgical History:   Procedure Laterality Date   • COLONOSCOPY     • EXTRACORPOREAL SHOCK WAVE LITHOTRIPSY     • FRACTURE SURGERY Right     ankle with hardware   • [place] CYSTO/URETERO W/LITHOTRIPSY &INDWELL STENT INSRT Left 07/18/2021    Procedure: CYSTOSCOPY URETEROSCOPY WITH LITHOTRIPSY HOLMIUM LASER, RETROGRADE PYELOGRAM AND INSERTION STENT URETERAL;  Surgeon: Vargas Burns MD;  Location: [address] [place] [address];  Service: Urology   • TRANSURETHRAL RESECTION OF PROSTATE     • US GUIDED THYROID BIOPSY  10/25/2021   • US GUIDED THYROID BIOPSY  03/09/2022     Social History     Substance and Sexual Activity   Alcohol Use Yes    Comment: rare     Social History     Substance and Sexual Activity Drug Use No     E-Cigarette/Vaping   • E-Cigarette Use Never User      E-Cigarette/Vaping Substances   • Nicotine No    • THC No    • CBD No    • Flavoring No    • Other No    • Unknown No      Social History     Tobacco Use   Smoking Status Former   • Types: Cigarettes   • Quit date:    • Years since quittin 9   Smokeless Tobacco Never     Family History: History reviewed  No pertinent family history  Meds/Allergies   all current active meds have been reviewed, current meds:   Current Facility-Administered Medications   Medication Dose Route Frequency   • diltiazem (CARDIZEM) 125 mg in sodium chloride 0 9 % 125 mL infusion  1-15 mg/hr Intravenous Once    and PTA meds:   Prior to Admission Medications   Prescriptions Last Dose Informant Patient Reported? Taking? amLODIPine (NORVASC) 10 mg tablet   No No   Sig: Take 1 tablet (10 mg total) by mouth daily   ergocalciferol (VITAMIN D2) 50,000 units   No No   Sig: Take 1 capsule (50,000 Units total) by mouth once a week   ibuprofen (MOTRIN) 200 mg tablet   Yes No   Sig: Take by mouth every 6 (six) hours as needed for mild pain   tamsulosin (FLOMAX) 0 4 mg   No No   Sig: Take 1 capsule (0 4 mg total) by mouth daily with dinner      Facility-Administered Medications: None     No Known Allergies    Objective   Vitals: Blood pressure 122/82, pulse 66, temperature 97 5 °F (36 4 °C), temperature source Oral, resp  rate 20, weight 73 6 kg (162 lb 4 1 oz), SpO2 96 %  Orthostatic Blood Pressures    Flowsheet Row Most Recent Value   Blood Pressure 122/82 filed at 2022 1330   Patient Position - Orthostatic VS Lying filed at 2022 1116          No intake or output data in the 24 hours ending 22 1409    Invasive Devices     Peripheral Intravenous Line  Duration           Peripheral IV 22 Right Antecubital <1 day                Physical Exam  Vitals and nursing note reviewed  Constitutional:       Appearance: Normal appearance   He is normal weight  HENT:      Right Ear: External ear normal       Left Ear: External ear normal    Eyes:      General: No scleral icterus  Right eye: No discharge  Left eye: No discharge  Cardiovascular:      Rate and Rhythm: Normal rate and regular rhythm  Pulses: Normal pulses  Heart sounds: Murmur heard  Pulmonary:      Effort: Pulmonary effort is normal       Breath sounds: Examination of the right-lower field reveals decreased breath sounds  Examination of the left-lower field reveals decreased breath sounds  Decreased breath sounds present  No wheezing  Comments: Course scattered breathe sounds    Abdominal:      General: Bowel sounds are normal  There is no distension  Palpations: Abdomen is soft  Skin:     General: Skin is warm and dry  Capillary Refill: Capillary refill takes less than 2 seconds  Neurological:      General: No focal deficit present  Mental Status: He is alert  Mental status is at baseline  Psychiatric:         Mood and Affect: Mood normal          Lab Results:   I have personally reviewed pertinent lab results      CBC with diff:   Results from last 7 days   Lab Units 12/23/22  1129   WBC Thousand/uL 9 56   RBC Million/uL 5 24   HEMOGLOBIN g/dL 16 8   HEMATOCRIT % 51 3*   MCV fL 98   MCH pg 32 1   MCHC g/dL 32 7   RDW % 14 2   MPV fL 9 2   PLATELETS Thousands/uL 191     CMP:   Results from last 7 days   Lab Units 12/23/22  1129   SODIUM mmol/L 145   CHLORIDE mmol/L 107   CO2 mmol/L 26   BUN mg/dL 24   CREATININE mg/dL 1 41*   CALCIUM mg/dL 9 0   AST U/L 27   ALT U/L 34   ALK PHOS U/L 62   EGFR ml/min/1 73sq m 49     HS Troponin:   0   Lab Value Date/Time    HSTNI0 10 12/23/2022 1129    HSTNI2 10 12/23/2022 1326     BNP:   Results from last 7 days   Lab Units 12/23/22  1129   POTASSIUM mmol/L 3 3*   CHLORIDE mmol/L 107   CO2 mmol/L 26   BUN mg/dL 24   CREATININE mg/dL 1 41*   CALCIUM mg/dL 9 0   EGFR ml/min/1 73sq m 49     Coags:   Results from last 7 days   Lab Units 12/23/22  1129   PTT seconds 26   INR  0 96     TSH:   Results from last 7 days   Lab Units 12/23/22  1129   TSH 3RD GENERATON uIU/mL 0 886     Magnesium:   Results from last 7 days   Lab Units 12/23/22  1129   MAGNESIUM mg/dL 2 1     Lipid Profile:     Imaging: I have personally reviewed pertinent reports      EKG: Initial 12 lead EKG demonstrated Rapid Atrial Fibrillation with RBBB,  Repeat EKG approximately 20 minutes later, patient had converted back to Sinus Rhythm  VTE Prophylaxis: Sequential compression device Nedra Sender)     Code Status: Prior  Advance Directive and Living Will:      Power of :    POLST:      [address] [place] [address]  Cardiology

## 2022-12-23 NOTE — H&P
[address] [facility] 1950, 67 y o  male MRN: [id number]  Unit/Bed#: ED CT1 Encounter: [id number]  Primary Care Provider: Libby Connor DO   Date and time admitted to hospital: 12/23/2022 11:11 AM    * Atrial fibrillation with rapid ventricular response [facility])  Assessment & Plan  Patient presented with shortness of breath with exertion for a few days, acutely worsening today  He feels much more short of breath at night when laying supine  CT revealed mild pulmonary septal thickening, consistent with mild CHF, slow transit of contrast consistent with cardiac failure  TSH negative  BNP 1655  Troponin negative  Patient converted to NSR in the ER with a dose of cardizem  • Admit to telemetry  • Echocardiogram  • Cardiology consultation    Mike Plascencia 22  Patient reports grandson had a cough two weeks ago  He has been feeling unwell for a few days  Covid vaccinated  Medication treatment started per COVID protocol:  ? Remdesivir  Check and trend inflammatory markers, CRP  Check troponin, proBNP, D-dimer  Daily CBC, CMP  Trend procalcitonin  Patient was educated and encouraged self proing  Increase activity and mobilization as tolerated  PT/OT as needed    SOFIA (acute kidney injury) ([facility] [address] )  Assessment & Plan  Baseline Cr 0 9-1 1  Cr elevated at 1 41 today  Poor oral intake, likely pre-renal  Gentle IV hydration  Urinalysis negative  Repeat BMP in AM  Consider renal ultrasound if no improvement given history of kidney stones     Hypokalemia  Assessment & Plan  Replaced, repeat in AM    VTE Prophylaxis: Heparin  / sequential compression device   Code Status: Prior    Anticipated Length of Stay:  Patient will be admitted on an Inpatient basis with an anticipated length of stay of greater than 2 midnights  Justification for Hospital Stay: covid, afib    Total Time for Visit, including Counseling / Coordination of Care: 15 minutes    Greater than 50% of this total time spent on direct patient counseling and coordination of care  Chief Complaint:   Shortness of Breath (Patient tachypneic, speaks very little english  To CT 1  Denies CP, monitor rapid A-fib  Not feeling well for 2-3 weeks  ) and Rapid Heart Rate      History of Present Illness:    Yue Conley is a 67 y o  male with a PMH of BPH, hypothyroidism, hypertension who presents with shortness of breath  Patient reports that his grandson was sick with a cough about 2 weeks ago  Patient has been feeling unwell for a bit now since then  Last night he reports his shortness of breath became very severe when he tried to lay down to go to sleep  It worsened this morning prompting him to come to the ER  In the ER patient was found to be in A  fib with RVR at a rate of 160  He denied any pain at that time  He was given Cardizem in the emergency department and converted to normal sinus rhythm  Patient states that he is never had a cardiac work-up in the past   Labs in the ER revealed patient is COVID-positive  He is vaccinated  He has an elevated D-dimer at 1 84 so CTA was done which revealed no pulmonary embolism but did reveal slow flow through the heart chambers and mild CHF  Patient admitted for further management    Review of Systems:    Review of Systems   Constitutional: Negative for chills and fever  HENT: Negative for ear pain and sore throat  Eyes: Negative for pain and visual disturbance  Respiratory: Positive for cough and shortness of breath (orthopnea)  Cardiovascular: Negative for chest pain and palpitations  Gastrointestinal: Negative for abdominal pain and vomiting  Genitourinary: Negative for dysuria and hematuria  Musculoskeletal: Negative for arthralgias and back pain  Skin: Negative for color change and rash  Neurological: Negative for seizures and syncope  All other systems reviewed and are negative        Past Medical and Surgical History:     Past Medical History: Diagnosis Date   • Arthritis     shoulder   • BPH (benign prostatic hyperplasia)    • Disease of thyroid gland     nodules   • Hypertension    • Kidney calculi     left   • Kidney stone    • Nocturia    • Urinary incontinence     During the night, urinary frequency during the day  • Wears glasses        Past Surgical History:   Procedure Laterality Date   • COLONOSCOPY     • EXTRACORPOREAL SHOCK WAVE LITHOTRIPSY     • FRACTURE SURGERY Right     ankle with hardware   • OK CYSTO/URETERO W/LITHOTRIPSY &INDWELL STENT INSRT Left 2021    Procedure: CYSTOSCOPY URETEROSCOPY WITH LITHOTRIPSY HOLMIUM LASER, RETROGRADE PYELOGRAM AND INSERTION STENT URETERAL;  Surgeon: Vargas Burns MD;  Location: [address] [place] [address];  Service: Urology   • TRANSURETHRAL RESECTION OF PROSTATE     • US GUIDED THYROID BIOPSY  10/25/2021   • US GUIDED THYROID BIOPSY  2022       Meds/Allergies:    Prior to Admission medications    Medication Sig Start Date End Date Taking? Authorizing Provider   amLODIPine (NORVASC) 10 mg tablet Take 1 tablet (10 mg total) by mouth daily 22   Marilia Sorenson DO   ergocalciferol (VITAMIN D2) 50,000 units Take 1 capsule (50,000 Units total) by mouth once a week 22   Lupe Whitehead MD   ibuprofen (MOTRIN) 200 mg tablet Take by mouth every 6 (six) hours as needed for mild pain    Historical Provider, MD   tamsulosin (FLOMAX) 0 4 mg Take 1 capsule (0 4 mg total) by mouth daily with dinner 22   Marilia Sorenson DO       Allergies: No Known Allergies    Social History:     Marital Status:    Substance Use History:   Social History     Substance and Sexual Activity   Alcohol Use Yes    Comment: rare     Social History     Tobacco Use   Smoking Status Former   • Types: Cigarettes   • Quit date:    • Years since quittin 9   Smokeless Tobacco Never     Social History     Substance and Sexual Activity   Drug Use No       Family History:    History reviewed   No pertinent family history  Physical Exam:     Vitals:   Blood Pressure: 122/82 (12/23/22 1330)  Pulse: 66 (12/23/22 1330)  Temperature: 97 5 °F (36 4 °C) (12/23/22 1116)  Temp Source: Oral (12/23/22 1116)  Respirations: 20 (12/23/22 1330)  Weight - Scale: 73 6 kg (162 lb 4 1 oz) (12/23/22 1116)  SpO2: 96 % (12/23/22 1330)    Physical Exam  Vitals and nursing note reviewed  Constitutional:       Appearance: Normal appearance  HENT:      Head: Normocephalic  Nose: Nose normal    Eyes:      Extraocular Movements: Extraocular movements intact  Cardiovascular:      Rate and Rhythm: Normal rate and regular rhythm  Heart sounds: No murmur heard  Pulmonary:      Effort: Pulmonary effort is normal  No respiratory distress  Breath sounds: Normal breath sounds  No wheezing  Abdominal:      General: Abdomen is flat  Bowel sounds are normal  There is no distension  Palpations: Abdomen is soft  Musculoskeletal:         General: No swelling  Normal range of motion  Skin:     General: Skin is warm  Neurological:      General: No focal deficit present  Mental Status: He is alert and oriented to person, place, and time  Psychiatric:         Mood and Affect: Mood normal          Behavior: Behavior normal            Additional Data:     Lab Results: I have personally reviewed pertinent reports  Results from last 7 days   Lab Units 12/23/22  1129   WBC Thousand/uL 9 56   HEMOGLOBIN g/dL 16 8   HEMATOCRIT % 51 3*   PLATELETS Thousands/uL 191   NEUTROS PCT % 63     Results from last 7 days   Lab Units 12/23/22  1129   SODIUM mmol/L 145   POTASSIUM mmol/L 3 3*   CHLORIDE mmol/L 107   CO2 mmol/L 26   BUN mg/dL 24   CREATININE mg/dL 1 41*   CALCIUM mg/dL 9 0   TOTAL BILIRUBIN mg/dL 0 64   ALK PHOS U/L 62   ALT U/L 34   AST U/L 27     Results from last 7 days   Lab Units 12/23/22  1129   INR  0 96                   Imaging: I have personally reviewed pertinent reports        CTA ED chest PE study   Final Result by Humera Lawrence MD (12/23 9849)      No pulmonary arterial embolism or pulmonary infiltrate/consolidation  Mild pulmonary interlobular septal thickening likely secondary to mild CHF; cardiomegaly and slow transit of the IV contrast bolus through the cardiac chambers indicates cardiac failure  The study was marked in [facility] for immediate notification  Workstation performed: [id number]         XR chest 1 view portable    (Results Pending)       CTA ED chest PE study   Final Result      No pulmonary arterial embolism or pulmonary infiltrate/consolidation  Mild pulmonary interlobular septal thickening likely secondary to mild CHF; cardiomegaly and slow transit of the IV contrast bolus through the cardiac chambers indicates cardiac failure  The study was marked in [facility] for immediate notification  Workstation performed: [id number]         XR chest 1 view portable    (Results Pending)       EKG, Pathology, and Other Studies Reviewed on Admission:   · EKG: afib with RVR    Allscripts / Epic Records Reviewed: Yes     ** Please Note: This note has been constructed using a voice recognition system   **

## 2022-12-23 NOTE — ASSESSMENT & PLAN NOTE
Patient reports grandson had a cough two weeks ago  He has been feeling unwell for a few days  Covid vaccinated    Medication treatment started per COVID protocol:  ? Remdesivir  Check and trend inflammatory markers, CRP  Check troponin, proBNP, D-dimer  Daily CBC, CMP  Trend procalcitonin  Patient was educated and encouraged self proing  Increase activity and mobilization as tolerated  PT/OT as needed

## 2022-12-23 NOTE — ED PROVIDER NOTES
History  Chief Complaint   Patient presents with   • Shortness of Breath     Patient tachypneic, speaks very little english  To CT 1  Denies CP, monitor rapid A-fib  Not feeling well for 2-3 weeks  • Rapid Heart Rate     17-year-old male presents to the ED with shortness of breath speaks very little [place]  Patient denies any chest pain but states he has not felt well and been a little short of breath the last couple of months  No fevers chills nausea vomiting or diarrhea  States he does take some medication for his blood pressure  History provided by:  Patient   used: Yes        Prior to Admission Medications   Prescriptions Last Dose Informant Patient Reported? Taking? amLODIPine (NORVASC) 10 mg tablet 12/22/2022  No Yes   Sig: Take 1 tablet (10 mg total) by mouth daily   ergocalciferol (VITAMIN D2) 50,000 units Past Week  No Yes   Sig: Take 1 capsule (50,000 Units total) by mouth once a week   ibuprofen (MOTRIN) 200 mg tablet Not Taking  Yes No   Sig: Take by mouth every 6 (six) hours as needed for mild pain   Patient not taking: Reported on 12/23/2022   tamsulosin (FLOMAX) 0 4 mg Not Taking  No No   Sig: Take 1 capsule (0 4 mg total) by mouth daily with dinner   Patient not taking: Reported on 12/23/2022      Facility-Administered Medications: None       Past Medical History:   Diagnosis Date   • Arthritis     shoulder   • BPH (benign prostatic hyperplasia)    • Disease of thyroid gland     nodules   • Hypertension    • Kidney calculi     left   • Kidney stone    • Nocturia    • Urinary incontinence     During the night, urinary frequency during the day     • Wears glasses        Past Surgical History:   Procedure Laterality Date   • COLONOSCOPY     • EXTRACORPOREAL SHOCK WAVE LITHOTRIPSY     • FRACTURE SURGERY Right     ankle with hardware   • HI CYSTO/URETERO W/LITHOTRIPSY &INDWELL STENT INSRT Left 07/18/2021    Procedure: CYSTOSCOPY URETEROSCOPY WITH LITHOTRIPSY HOLMIUM LASER, RETROGRADE PYELOGRAM AND INSERTION STENT URETERAL;  Surgeon: Tsering Cabrera MD;  Location: [address] [place] [address];  Service: Urology   • TRANSURETHRAL RESECTION OF PROSTATE     • US GUIDED THYROID BIOPSY  10/25/2021   • US GUIDED THYROID BIOPSY  2022       History reviewed  No pertinent family history  I have reviewed and agree with the history as documented  E-Cigarette/Vaping   • E-Cigarette Use Never User      E-Cigarette/Vaping Substances   • Nicotine No    • THC No    • CBD No    • Flavoring No    • Other No    • Unknown No      Social History     Tobacco Use   • Smoking status: Former     Types: Cigarettes     Quit date:      Years since quittin 0   • Smokeless tobacco: Never   Vaping Use   • Vaping Use: Never used   Substance Use Topics   • Alcohol use: Yes     Comment: rare   • Drug use: No       Review of Systems   Constitutional: Negative for activity change, chills, diaphoresis and fever  HENT: Negative for congestion, ear pain, nosebleeds, sore throat, trouble swallowing and voice change  Eyes: Negative for pain, discharge and redness  Respiratory: Positive for shortness of breath  Negative for apnea, cough, choking, wheezing and stridor  Cardiovascular: Negative for chest pain and palpitations  Gastrointestinal: Negative for abdominal distention, abdominal pain, constipation, diarrhea, nausea and vomiting  Endocrine: Negative for polydipsia  Genitourinary: Negative for difficulty urinating, dysuria, flank pain, frequency, hematuria and urgency  Musculoskeletal: Negative for back pain, gait problem, joint swelling, myalgias, neck pain and neck stiffness  Skin: Negative for pallor and rash  Neurological: Positive for weakness  Negative for dizziness, tremors, syncope, speech difficulty, numbness and headaches  Hematological: Negative for adenopathy  Psychiatric/Behavioral: Negative for confusion, hallucinations, self-injury and suicidal ideas   The patient is not nervous/anxious  Physical Exam  Physical Exam  Vitals and nursing note reviewed  Constitutional:       General: He is not in acute distress  Appearance: He is well-developed  He is not diaphoretic  HENT:      Head: Normocephalic and atraumatic  Right Ear: External ear normal       Left Ear: External ear normal       Nose: Nose normal    Eyes:      Conjunctiva/sclera: Conjunctivae normal       Pupils: Pupils are equal, round, and reactive to light  Cardiovascular:      Rate and Rhythm: Tachycardia present  Rhythm irregular  Heart sounds: Normal heart sounds  Comments: Cardiac monitor shows rapid atrial fibrillation rate of 160  Pulmonary:      Effort: Pulmonary effort is normal       Breath sounds: Normal breath sounds  Abdominal:      General: Bowel sounds are normal       Palpations: Abdomen is soft  Musculoskeletal:         General: Normal range of motion  Cervical back: Normal range of motion and neck supple  Skin:     General: Skin is warm and dry  Neurological:      Mental Status: He is alert and oriented to person, place, and time  Deep Tendon Reflexes: Reflexes are normal and symmetric           Vital Signs  ED Triage Vitals [12/23/22 1116]   Temperature Pulse Respirations Blood Pressure SpO2   97 5 °F (36 4 °C) (!) 164 (!) 32 121/72 98 %      Temp Source Heart Rate Source Patient Position - Orthostatic VS BP Location FiO2 (%)   Oral Monitor Lying Left arm --      Pain Score       No Pain           Vitals:    12/24/22 0407 12/24/22 0842 12/24/22 0844 12/24/22 1255   BP: 124/88 133/93 133/93    Pulse: (!) 54 64 60 69   Patient Position - Orthostatic VS:             Visual Acuity      ED Medications  Medications   diltiazem (CARDIZEM) injection 15 mg (15 mg Intravenous Given 12/23/22 1120)   sodium chloride 0 9 % bolus 500 mL (0 mL Intravenous Stopped 12/23/22 1251)   iohexol (OMNIPAQUE) 350 MG/ML injection (SINGLE-DOSE) 85 mL (85 mL Intravenous Given 12/23/22 1211)   aspirin tablet 325 mg (325 mg Oral Given 12/23/22 1248)   potassium chloride (K-DUR,KLOR-CON) CR tablet 40 mEq (40 mEq Oral Given 12/23/22 1436)   remdesivir (Veklury) 200 mg in sodium chloride 0 9 % 290 mL IVPB (200 mg Intravenous Given 12/23/22 1752)   furosemide (LASIX) injection 20 mg (20 mg Intravenous Given 12/23/22 1440)   potassium chloride (K-DUR,KLOR-CON) CR tablet 20 mEq (20 mEq Oral Given 12/23/22 1803)   potassium chloride (K-DUR,KLOR-CON) CR tablet 40 mEq (40 mEq Oral Given 12/24/22 0928)       Diagnostic Studies  Results Reviewed     Procedure Component Value Units Date/Time    Procalcitonin [[id number]]  (Normal) Collected: 12/24/22 0606    Lab Status: Final result Specimen: Blood from Arm, Left Updated: 12/24/22 0709     Procalcitonin 0 11 ng/ml     Comprehensive metabolic panel [[id number]]  (Abnormal) Collected: 12/24/22 0606    Lab Status: Final result Specimen: Blood from Arm, Left Updated: 12/24/22 0703     Sodium 141 mmol/L      Potassium 3 6 mmol/L      Chloride 106 mmol/L      CO2 25 mmol/L      ANION GAP 10 mmol/L      BUN 22 mg/dL      Creatinine 1 01 mg/dL      Glucose 94 mg/dL      Calcium 8 2 mg/dL      Corrected Calcium 9 1 mg/dL      AST 24 U/L      ALT 29 U/L      Alkaline Phosphatase 60 U/L      Total Protein 6 1 g/dL      Albumin 2 9 g/dL      Total Bilirubin 0 71 mg/dL      eGFR 73 ml/min/1 73sq m     Narrative:      [place] guidelines for Chronic Kidney Disease (CKD):   •  Stage 1 with normal or high GFR (GFR > 90 mL/min/1 73 square meters)  •  Stage 2 Mild CKD (GFR = 60-89 mL/min/1 73 square meters)  •  Stage 3A Moderate CKD (GFR = 45-59 mL/min/1 73 square meters)  •  Stage 3B Moderate CKD (GFR = 30-44 mL/min/1 73 square meters)  •  Stage 4 Severe CKD (GFR = 15-29 mL/min/1 73 square meters)  •  Stage 5 End Stage CKD (GFR <15 mL/min/1 73 square meters)  Note: GFR calculation is accurate only with a steady state creatinine    C-reactive protein [[id number]]  (Abnormal) Collected: 12/24/22 0606    Lab Status: Final result Specimen: Blood from Arm, Left Updated: 12/24/22 0703     CRP 5 5 mg/L     CBC and differential [[id number]] Collected: 12/24/22 0606    Lab Status: Final result Specimen: Blood from Arm, Left Updated: 12/24/22 0621     WBC 6 74 Thousand/uL      RBC 4 72 Million/uL      Hemoglobin 15 2 g/dL      Hematocrit 46 3 %      MCV 98 fL      MCH 32 2 pg      MCHC 32 8 g/dL      RDW 13 9 %      MPV 9 1 fL      Platelets 344 Thousands/uL      nRBC 0 /100 WBCs      Neutrophils Relative 60 %      Immat GRANS % 0 %      Lymphocytes Relative 32 %      Monocytes Relative 8 %      Eosinophils Relative 0 %      Basophils Relative 0 %      Neutrophils Absolute 4 01 Thousands/µL      Immature Grans Absolute 0 01 Thousand/uL      Lymphocytes Absolute 2 13 Thousands/µL      Monocytes Absolute 0 56 Thousand/µL      Eosinophils Absolute 0 01 Thousand/µL      Basophils Absolute 0 02 Thousands/µL     Hemoglobin A1C w/ EAG Estimation [[id number]]  (Abnormal) Collected: 12/23/22 1129    Lab Status: Final result Specimen: Blood from Arm, Right Updated: 12/23/22 1835     Hemoglobin A1C 5 7 %       mg/dl     Procalcitonin [[id number]]  (Normal) Collected: 12/23/22 1601    Lab Status: Final result Specimen: Blood from Arm, Right Updated: 12/23/22 1631     Procalcitonin 0 13 ng/ml     C-reactive protein [[id number]]  (Abnormal) Collected: 12/23/22 1601    Lab Status: Final result Specimen: Blood from Arm, Right Updated: 12/23/22 1618     CRP 5 1 mg/L     HS Troponin I 2hr [[id number]]  (Normal) Collected: 12/23/22 1326    Lab Status: Final result Specimen: Blood from Arm, Right Updated: 12/23/22 1408     hs TnI 2hr 10 ng/L      Delta 2hr hsTnI 0 ng/L     UA (URINE) with reflex to Scope [[id number]] Collected: 12/23/22 1252    Lab Status: Final result Specimen: Urine, Clean Catch Updated: 12/23/22 1314     Color, UA Yellow     Clarity, UA Clear     Specific Wall, UA 1 010     pH, UA 6 0     Leukocytes, UA Negative     Nitrite, UA Negative     Protein, UA Negative mg/dl      Glucose, UA Negative mg/dl      Ketones, UA Negative mg/dl      Urobilinogen, UA 0 2 E U /dl      Bilirubin, UA Negative     Occult Blood, UA Negative    FLU/RSV/COVID - if FLU/RSV clinically relevant [[id number]]  (Abnormal) Collected: 12/23/22 1129    Lab Status: Final result Specimen: Nares from Nose Updated: 12/23/22 1217     SARS-CoV-2 Positive     INFLUENZA A PCR Negative     INFLUENZA B PCR Negative     RSV PCR Negative    Narrative:      FOR PEDIATRIC PATIENTS - copy/paste COVID Guidelines URL to browser: [email or web address]    SARS-CoV-2 assay is a Nucleic Acid Amplification assay intended for the  qualitative detection of nucleic acid from SARS-CoV-2 in nasopharyngeal  swabs  Results are for the presumptive identification of SARS-CoV-2 RNA  Positive results are indicative of infection with SARS-CoV-2, the virus  causing COVID-19, but do not rule out bacterial infection or co-infection  with other viruses  Laboratories within the [place] and its  territories are required to report all positive results to the appropriate  public health authorities  Negative results do not preclude SARS-CoV-2  infection and should not be used as the sole basis for treatment or other  patient management decisions  Negative results must be combined with  clinical observations, patient history, and epidemiological information  This test has not been FDA cleared or approved  This test has been authorized by FDA under an Emergency Use Authorization  (EUA)  This test is only authorized for the duration of time the  declaration that circumstances exist justifying the authorization of the  emergency use of an in vitro diagnostic tests for detection of SARS-CoV-2  virus and/or diagnosis of COVID-19 infection under section 564(b)(1) of  the Act, 21 U  S C  360bbb-3(b)(1), unless the authorization is terminated  or revoked sooner  The test has been validated but independent review by FDA  and CLIA is pending  Test performed using [employer] GeneXpert: This RT-PCR assay targets N2,  a region unique to SARS-CoV-2  A conserved region in the E-gene was chosen  for pan-Sarbecovirus detection which includes SARS-CoV-2  According to CMS-2020-01-R, this platform meets the definition of high-throughput technology  TSH [[id number]]  (Normal) Collected: 12/23/22 1129    Lab Status: Final result Specimen: Blood from Arm, Right Updated: 12/23/22 1200     TSH 3RD GENERATON 0 886 uIU/mL     Narrative:      Patients undergoing fluorescein dye angiography may retain small amounts of fluorescein in the body for 48-72 hours post procedure  Samples containing fluorescein can produce falsely depressed TSH values  If the patient had this procedure,a specimen should be resubmitted post fluorescein clearance  Magnesium [[id number]]  (Normal) Collected: 12/23/22 1129    Lab Status: Final result Specimen: Blood from Arm, Right Updated: 12/23/22 1200     Magnesium 2 1 mg/dL     NT-BNP PRO [[id number]]  (Abnormal) Collected: 12/23/22 1129    Lab Status: Final result Specimen: Blood from Arm, Right Updated: 12/23/22 1200     NT-proBNP 1,655 pg/mL     HS Troponin 0hr (reflex protocol) [[id number]]  (Normal) Collected: 12/23/22 1129    Lab Status: Final result Specimen: Blood from Arm, Right Updated: 12/23/22 1159     hs TnI 0hr 10 ng/L     D-Dimer [[id number]]  (Abnormal) Collected: 12/23/22 1129    Lab Status: Final result Specimen: Blood from Arm, Right Updated: 12/23/22 1157     D-Dimer, Quant 0 84 ug/ml FEU     Narrative:       In the evaluation for possible pulmonary embolism, in the appropriate (Well's Score of 4 or less) patient, the age adjusted d-dimer cutoff for this patient can be calculated as:    Age x 0 01 (in ug/mL) for Age-adjusted D-dimer exclusion threshold for a patient over 50 years      Comprehensive metabolic panel [[id number]]  (Abnormal) Collected: 12/23/22 1129    Lab Status: Final result Specimen: Blood from Arm, Right Updated: 12/23/22 1152     Sodium 145 mmol/L      Potassium 3 3 mmol/L      Chloride 107 mmol/L      CO2 26 mmol/L      ANION GAP 12 mmol/L      BUN 24 mg/dL      Creatinine 1 41 mg/dL      Glucose 109 mg/dL      Calcium 9 0 mg/dL      Corrected Calcium 9 6 mg/dL      AST 27 U/L      ALT 34 U/L      Alkaline Phosphatase 62 U/L      Total Protein 6 8 g/dL      Albumin 3 2 g/dL      Total Bilirubin 0 64 mg/dL      eGFR 49 ml/min/1 73sq m     Narrative:      [place] guidelines for Chronic Kidney Disease (CKD):   •  Stage 1 with normal or high GFR (GFR > 90 mL/min/1 73 square meters)  •  Stage 2 Mild CKD (GFR = 60-89 mL/min/1 73 square meters)  •  Stage 3A Moderate CKD (GFR = 45-59 mL/min/1 73 square meters)  •  Stage 3B Moderate CKD (GFR = 30-44 mL/min/1 73 square meters)  •  Stage 4 Severe CKD (GFR = 15-29 mL/min/1 73 square meters)  •  Stage 5 End Stage CKD (GFR <15 mL/min/1 73 square meters)  Note: GFR calculation is accurate only with a steady state creatinine    Protime-INR [[id number]]  (Normal) Collected: 12/23/22 1129    Lab Status: Final result Specimen: Blood from Arm, Right Updated: 12/23/22 1147     Protime 12 9 seconds      INR 0 96    APTT [[id number]]  (Normal) Collected: 12/23/22 1129    Lab Status: Final result Specimen: Blood from Arm, Right Updated: 12/23/22 1147     PTT 26 seconds     CBC and differential [[id number]]  (Abnormal) Collected: 12/23/22 1129    Lab Status: Final result Specimen: Blood from Arm, Right Updated: 12/23/22 1135     WBC 9 56 Thousand/uL      RBC 5 24 Million/uL      Hemoglobin 16 8 g/dL      Hematocrit 51 3 %      MCV 98 fL      MCH 32 1 pg      MCHC 32 7 g/dL      RDW 14 2 %      MPV 9 2 fL      Platelets 948 Thousands/uL      nRBC 0 /100 WBCs      Neutrophils Relative 63 %      Immat GRANS % 1 % Lymphocytes Relative 26 %      Monocytes Relative 10 %      Eosinophils Relative 0 %      Basophils Relative 0 %      Neutrophils Absolute 6 03 Thousands/µL      Immature Grans Absolute 0 05 Thousand/uL      Lymphocytes Absolute 2 52 Thousands/µL      Monocytes Absolute 0 92 Thousand/µL      Eosinophils Absolute 0 01 Thousand/µL      Basophils Absolute 0 03 Thousands/µL                  CTA ED chest PE study   Final Result by Maddy Vicente MD (12/23 3306)      No pulmonary arterial embolism or pulmonary infiltrate/consolidation  Mild pulmonary interlobular septal thickening likely secondary to mild CHF; cardiomegaly and slow transit of the IV contrast bolus through the cardiac chambers indicates cardiac failure  The study was marked in [facility] for immediate notification  Workstation performed: [id number]         XR chest 1 view portable   Final Result by Jovanna Pettit MD (12/23 0162)      No acute cardiopulmonary disease  Workstation performed: [id number]                    Procedures  Procedures         ED Course                               SBIRT 22yo+    Flowsheet Row Most Recent Value   SBIRT (23 yo +)    In order to provide better care to our patients, we are screening all of our patients for alcohol and drug use  Would it be okay to ask you these screening questions?  No Filed at: 12/23/2022 1133                    MDM    Disposition  Final diagnoses:   Atrial fibrillation with rapid ventricular response (HCC)   CHF (congestive heart failure) ([facility] [address] )   COVID-19     Time reflects when diagnosis was documented in both MDM as applicable and the Disposition within this note     Time User Action Codes Description Comment    12/23/2022  1:34 PM Michelle Cyr Add [I48 91] Atrial fibrillation with rapid ventricular response ([facility] [address] )     12/23/2022  1:34 PM Annalise VEGA Add [I50 9] CHF (congestive heart failure) ([facility] [address] )     12/23/2022  1:34 PM Michelle Cyr Add [U07 1] COVID-19       ED Disposition     ED Disposition   Admit    Condition   Stable    Date/Time   Fri Dec 23, 2022  1:34 PM    Comment   Case was discussed with hospitalist and the patient's admission status was agreed to be Admission Status: inpatient status to the service of Dr Saritha Weiner   Follow-up Information     Follow up With Specialties Details Why Mike Barahona 89, DO Cardiology Schedule an appointment as soon as possible for a visit in 1 month(s) Hospital follow up [address] [place] [address]  [phone]            Discharge Medication List as of 12/24/2022  2:50 PM      START taking these medications    Details   apixaban (ELIQUIS) 5 mg Take 1 tablet (5 mg total) by mouth 2 (two) times a day, Starting Fri 12/23/2022, Normal      metoprolol succinate (TOPROL-XL) 25 mg 24 hr tablet Take 1 tablet (25 mg total) by mouth daily at bedtime, Starting Sat 12/24/2022, Normal         CONTINUE these medications which have NOT CHANGED    Details   ergocalciferol (VITAMIN D2) 50,000 units Take 1 capsule (50,000 Units total) by mouth once a week, Starting Sun 12/18/2022, Normal      amLODIPine (NORVASC) 10 mg tablet Take 1 tablet (10 mg total) by mouth daily, Starting Mon 11/14/2022, Normal         STOP taking these medications       ibuprofen (MOTRIN) 200 mg tablet Comments:   Reason for Stopping:         tamsulosin (FLOMAX) 0 4 mg Comments:   Reason for Stopping:               No discharge procedures on file      PDMP Review     None          ED Provider  Electronically Signed by           Brando Morgan DO  12/29/22 8347

## 2022-12-24 VITALS
RESPIRATION RATE: 19 BRPM | DIASTOLIC BLOOD PRESSURE: 93 MMHG | HEIGHT: 66 IN | SYSTOLIC BLOOD PRESSURE: 133 MMHG | TEMPERATURE: 97.6 F | HEART RATE: 69 BPM | WEIGHT: 160.8 LBS | OXYGEN SATURATION: 98 % | BODY MASS INDEX: 25.84 KG/M2

## 2022-12-24 LAB
ALBUMIN SERPL BCP-MCNC: 2.9 G/DL (ref 3.5–5)
ALP SERPL-CCNC: 60 U/L (ref 46–116)
ALT SERPL W P-5'-P-CCNC: 29 U/L (ref 12–78)
ANION GAP SERPL CALCULATED.3IONS-SCNC: 10 MMOL/L (ref 4–13)
AST SERPL W P-5'-P-CCNC: 24 U/L (ref 5–45)
BASOPHILS # BLD AUTO: 0.02 THOUSANDS/ÂΜL (ref 0–0.1)
BASOPHILS NFR BLD AUTO: 0 % (ref 0–1)
BILIRUB SERPL-MCNC: 0.71 MG/DL (ref 0.2–1)
BUN SERPL-MCNC: 22 MG/DL (ref 5–25)
CALCIUM ALBUM COR SERPL-MCNC: 9.1 MG/DL (ref 8.3–10.1)
CALCIUM SERPL-MCNC: 8.2 MG/DL (ref 8.3–10.1)
CHLORIDE SERPL-SCNC: 106 MMOL/L (ref 96–108)
CO2 SERPL-SCNC: 25 MMOL/L (ref 21–32)
CREAT SERPL-MCNC: 1.01 MG/DL (ref 0.6–1.3)
CRP SERPL QL: 5.5 MG/L
EOSINOPHIL # BLD AUTO: 0.01 THOUSAND/ÂΜL (ref 0–0.61)
EOSINOPHIL NFR BLD AUTO: 0 % (ref 0–6)
ERYTHROCYTE [DISTWIDTH] IN BLOOD BY AUTOMATED COUNT: 13.9 % (ref 11.6–15.1)
GFR SERPL CREATININE-BSD FRML MDRD: 73 ML/MIN/1.73SQ M
GLUCOSE SERPL-MCNC: 94 MG/DL (ref 65–140)
HCT VFR BLD AUTO: 46.3 % (ref 36.5–49.3)
HGB BLD-MCNC: 15.2 G/DL (ref 12–17)
IMM GRANULOCYTES # BLD AUTO: 0.01 THOUSAND/UL (ref 0–0.2)
IMM GRANULOCYTES NFR BLD AUTO: 0 % (ref 0–2)
LYMPHOCYTES # BLD AUTO: 2.13 THOUSANDS/ÂΜL (ref 0.6–4.47)
LYMPHOCYTES NFR BLD AUTO: 32 % (ref 14–44)
MCH RBC QN AUTO: 32.2 PG (ref 26.8–34.3)
MCHC RBC AUTO-ENTMCNC: 32.8 G/DL (ref 31.4–37.4)
MCV RBC AUTO: 98 FL (ref 82–98)
MONOCYTES # BLD AUTO: 0.56 THOUSAND/ÂΜL (ref 0.17–1.22)
MONOCYTES NFR BLD AUTO: 8 % (ref 4–12)
NEUTROPHILS # BLD AUTO: 4.01 THOUSANDS/ÂΜL (ref 1.85–7.62)
NEUTS SEG NFR BLD AUTO: 60 % (ref 43–75)
NRBC BLD AUTO-RTO: 0 /100 WBCS
PLATELET # BLD AUTO: 177 THOUSANDS/UL (ref 149–390)
PMV BLD AUTO: 9.1 FL (ref 8.9–12.7)
POTASSIUM SERPL-SCNC: 3.6 MMOL/L (ref 3.5–5.3)
PROCALCITONIN SERPL-MCNC: 0.11 NG/ML
PROT SERPL-MCNC: 6.1 G/DL (ref 6.4–8.4)
RBC # BLD AUTO: 4.72 MILLION/UL (ref 3.88–5.62)
SODIUM SERPL-SCNC: 141 MMOL/L (ref 135–147)
WBC # BLD AUTO: 6.74 THOUSAND/UL (ref 4.31–10.16)

## 2022-12-24 RX ORDER — POTASSIUM CHLORIDE 20 MEQ/1
40 TABLET, EXTENDED RELEASE ORAL ONCE
Status: COMPLETED | OUTPATIENT
Start: 2022-12-24 | End: 2022-12-24

## 2022-12-24 RX ORDER — METOPROLOL SUCCINATE 25 MG/1
25 TABLET, EXTENDED RELEASE ORAL
Status: DISCONTINUED | OUTPATIENT
Start: 2022-12-24 | End: 2022-12-24 | Stop reason: HOSPADM

## 2022-12-24 RX ORDER — METOPROLOL SUCCINATE 25 MG/1
25 TABLET, EXTENDED RELEASE ORAL
Qty: 30 TABLET | Refills: 1 | Status: SHIPPED | OUTPATIENT
Start: 2022-12-24

## 2022-12-24 RX ORDER — AMLODIPINE BESYLATE 10 MG/1
10 TABLET ORAL DAILY
Status: DISCONTINUED | OUTPATIENT
Start: 2022-12-24 | End: 2022-12-24 | Stop reason: HOSPADM

## 2022-12-24 RX ADMIN — AMLODIPINE BESYLATE 10 MG: 10 TABLET ORAL at 09:37

## 2022-12-24 RX ADMIN — APIXABAN 5 MG: 5 TABLET, FILM COATED ORAL at 08:39

## 2022-12-24 RX ADMIN — POTASSIUM CHLORIDE 40 MEQ: 1500 TABLET, EXTENDED RELEASE ORAL at 09:28

## 2022-12-24 RX ADMIN — METOPROLOL TARTRATE 12.5 MG: 25 TABLET, FILM COATED ORAL at 08:45

## 2022-12-24 NOTE — PROGRESS NOTES
Progress Note - Cardiology   [facility]     [facility] 67 y o  male MRN: [id number]  : 1950  Unit/Bed#: [facility] 406- Encounter: [id number]    Assessment and Plan:   1  Paroxysmal Atrial fibrllation of unknown duration: Patient presented with 1 month history of shortness of breath and breathing improved with spontaneous resumption of sinus rhythm    -   Echocardiogram performed on admission demonstrated an EF of 60% with normal valve function     -   Started on Toprol-XL 25 mg nightly    -   CHADS2 vasc = 3, patient started on Eliquis 5 mg twice daily    -   Follow-up with cardiology in the outpatient setting for further monitoring and testing    2  COVID 19 Viral infection: Managed per primary team    3  Hypertension: Blood pressure stable    4  Volume Overload: Secondary to irregular heart rate  -   Patient received Lasix 20 mg yesterday with good response    Patient appears to be stable from a cardiac standpoint  May be discharged when cleared by primary team    Subjective / Objective:   Patient seen and examined  He presented to the emergency room yesterday with complaints of 1 month history of increasing shortness of breath  He was screened positive for COVID and was found to be in rapid atrial fibrillation on monitor  After receiving Cardizem 15 mg IV bolus he converted spontaneously back to sinus rhythm  Patient noted improvement in his breathing after resuming sinus rhythm  Vitals: Blood pressure 133/93, pulse 60, temperature 97 6 °F (36 4 °C), resp  rate 19, height 5' 6" (1 676 m), weight 72 9 kg (160 lb 12 8 oz), SpO2 96 %  Vitals:    22 1735 22 0600   Weight: 73 6 kg (162 lb 4 1 oz) 72 9 kg (160 lb 12 8 oz)     Body mass index is 25 95 kg/m²    BP Readings from Last 3 Encounters:   22 133/93   22 128/87   22 122/80     Orthostatic Blood Pressures    Flowsheet Row Most Recent Value   Blood Pressure 133/93 filed at 12/24/2022 0844   Patient Position - Orthostatic VS Lying filed at 12/23/2022 1735        I/O       12/22 0701  12/23 0700 12/23 0701  12/24 0700 12/24 0701  12/25 0700    Urine (mL/kg/hr)  900     Total Output  900     Net  -900                Invasive Devices     Peripheral Intravenous Line  Duration           Peripheral IV 12/23/22 Right Antecubital <1 day                  Intake/Output Summary (Last 24 hours) at 12/24/2022 0914  Last data filed at 12/23/2022 1716  Gross per 24 hour   Intake --   Output 900 ml   Net -900 ml         Physical Exam:   Physical Exam  Vitals and nursing note reviewed  Constitutional:       General: He is not in acute distress  Appearance: Normal appearance  He is normal weight  HENT:      Right Ear: External ear normal       Left Ear: External ear normal    Eyes:      General: No scleral icterus  Right eye: No discharge  Left eye: No discharge  Cardiovascular:      Rate and Rhythm: Normal rate and regular rhythm  Pulses: Normal pulses  Heart sounds: Normal heart sounds  Pulmonary:      Effort: Pulmonary effort is normal  No respiratory distress  Comments: Coarse scattered breath sounds  Abdominal:      General: Bowel sounds are normal  There is no distension  Palpations: Abdomen is soft  Musculoskeletal:      Right lower leg: No edema  Left lower leg: No edema  Skin:     General: Skin is warm and dry  Capillary Refill: Capillary refill takes less than 2 seconds  Neurological:      General: No focal deficit present  Mental Status: He is alert and oriented to person, place, and time  Mental status is at baseline     Psychiatric:         Mood and Affect: Mood normal                  Medications/ Allergies:     Current Facility-Administered Medications   Medication Dose Route Frequency Provider Last Rate   • apixaban  5 mg Oral BID SARA Oneil     • metoprolol tartrate  12 5 mg Oral Q12H [address] SARA Oneil • potassium chloride  40 mEq Oral Once SARA Zimmerman     • remdesivir  100 mg Intravenous Q24H SARA Huddleston     • tamsulosin  0 4 mg Oral Daily With Dinner SARA Huddleston          No Known Allergies    VTE Pharmacologic Prophylaxis:   Sequential compression device (Venodyne)     Labs:   Troponins:  Results from last 7 days   Lab Units 12/23/22  1326   HSTNI D2 ng/L 0     CBC with diff:  Results from last 7 days   Lab Units 12/24/22  0606 12/23/22  1129   WBC Thousand/uL 6 74 9 56   HEMOGLOBIN g/dL 15 2 16 8   HEMATOCRIT % 46 3 51 3*   MCV fL 98 98   PLATELETS Thousands/uL 177 191   MCH pg 32 2 32 1   MCHC g/dL 32 8 32 7   RDW % 13 9 14 2   MPV fL 9 1 9 2   NRBC AUTO /100 WBCs 0 0     CMP:  Results from last 7 days   Lab Units 12/24/22  0606 12/23/22  1129   SODIUM mmol/L 141 145   POTASSIUM mmol/L 3 6 3 3*   CHLORIDE mmol/L 106 107   CO2 mmol/L 25 26   ANION GAP mmol/L 10 12   BUN mg/dL 22 24   CREATININE mg/dL 1 01 1 41*   CALCIUM mg/dL 8 2* 9 0   AST U/L 24 27   ALT U/L 29 34   ALK PHOS U/L 60 62   TOTAL PROTEIN g/dL 6 1* 6 8   ALBUMIN g/dL 2 9* 3 2*   TOTAL BILIRUBIN mg/dL 0 71 0 64   EGFR ml/min/1 73sq m 73 49     Magnesium:  Results from last 7 days   Lab Units 12/23/22  1129   MAGNESIUM mg/dL 2 1     Coags:  Results from last 7 days   Lab Units 12/23/22  1129   PTT seconds 26   INR  0 96     TSH:  Results from last 7 days   Lab Units 12/23/22  1129   TSH 3RD GENERATON uIU/mL 0 886     Hgb A1c:  Results from last 7 days   Lab Units 12/23/22  1129   HEMOGLOBIN A1C % 5 7*     NT-proBNP:   Recent Labs     12/23/22  1129   NTBNP 1,655*        Imaging & Testing   I have personally reviewed pertinent reports  XR chest 1 view portable    Result Date: 12/23/2022  Narrative: CHEST INDICATION:   Atrial fibrillation    COMPARISON:  None EXAM PERFORMED/VIEWS:  XR CHEST PORTABLE FINDINGS: Cardiomediastinal silhouette appears unremarkable  The lungs are clear  No pneumothorax or pleural effusion  Osseous structures appear within normal limits for patient age  Impression: No acute cardiopulmonary disease  Workstation performed: [id number]     CTA ED chest PE study    Result Date: 12/23/2022  Narrative: CTA - CHEST WITH IV CONTRAST - PULMONARY ANGIOGRAM INDICATION:   afibb with rvr   elevated d dimer  "20-year-old male presents to the ED with shortness of breath speaks very little [place]  Patient denies any chest pain but states he has not felt well and been a little short of breath the last couple of months   "  Patient has confirmed COVID-19  COMPARISON: CT lung screening 9/1/2021 TECHNIQUE: CTA examination of the chest was performed using angiographic technique according to a protocol specifically tailored to evaluate for pulmonary embolism  Axial, sagittal, and coronal 2D reformatted images were created from the source data and  submitted for interpretation  In addition, coronal 3D MIP postprocessing was performed on the acquisition scanner  Radiation dose length product (DLP) for this visit:  499 58 mGy-cm   This examination, like all CT scans performed in the [facility], was performed utilizing techniques to minimize radiation dose exposure, including the use of iterative  reconstruction and automated exposure control  IV Contrast:  85 mL of iohexol (OMNIPAQUE)  FINDINGS: PULMONARY ARTERIAL TREE:  No pulmonary embolus is seen  LUNGS:  Mild diffuse interlobular septal thickening may indicate mild pulmonary edema/CHF  Shallow inspiration with bibasilar-predominant hypoventilatory changes  No endotracheal or endobronchial lesion  PLEURA:  Unremarkable  HEART/GREAT VESSELS:  Reidentified cardiomegaly  Left posterior pericardial effusion measuring 12 mm  Slow transit of the oral contrast bolus through the cardiac chambers in keeping with CHF  No thoracic aortic aneurysm  MEDIASTINUM AND TAJ:  Unremarkable   CHEST WALL AND LOWER NECK:   Less well seen 2 cm right thyroid nodule see prior thyroid ultrasound report  VISUALIZED STRUCTURES IN THE UPPER ABDOMEN:  Partially imaged left adrenal adenoma and numerous hepatic cysts  OSSEOUS STRUCTURES:  No acute fracture or destructive osseous lesion  Impression: No pulmonary arterial embolism or pulmonary infiltrate/consolidation  Mild pulmonary interlobular septal thickening likely secondary to mild CHF; cardiomegaly and slow transit of the IV contrast bolus through the cardiac chambers indicates cardiac failure  The study was marked in [facility] for immediate notification  Workstation performed: [id number]     Echo follow up/limited w/ contrast if indicated    Result Date: 12/23/2022  Narrative: •  Left Ventricle: Left ventricular cavity size is normal  Wall thickness is normal  The left ventricular ejection fraction is 60% by visual estimation  Systolic function is normal  Wall motion is normal  Diastolic function is normal for age  Left atrial filling pressure is normal  •  Aortic Valve: The aortic valve is trileaflet  The leaflets are not thickened  The leaflets are moderately calcified  There is mildly reduced mobility  •  Mitral Valve: There is trace regurgitation  •  Tricuspid Valve: There is trace regurgitation  The right ventricular systolic pressure is normal  The estimated right ventricular systolic pressure is 41 11 mmHg  EKG / Monitor: Personally reviewed  Patient maintaining sinus rhythm since admitted      [facility]  Cardiology      "This note was completed in part utilizing m-modal [employer] direct voice recognition software  Grammatical errors, random word insertion, spelling mistakes, and incomplete sentences may be an occasional consequence of the system secondary to software limitations, ambient noise and hardware issues  Please read the chart carefully and recognize, using context, where substitutions have occurred    If you have any questions or concerns about the context, text or information contained within the body of this dictation, please contact myself, the provider, for further clarification "

## 2022-12-24 NOTE — DISCHARGE SUMMARY
Joelle 45  Discharge- Kriss Snowball 1950, 67 y o  male MRN: [id number]  Unit/Bed#: [address] Encounter: [id number]  Primary Care Provider: Aurelio Samuels DO   Date and time admitted to hospital: 12/23/2022 11:11 AM    COVID  Assessment & Plan  Patient reports luca had a cough two weeks ago  He has been feeling unwell for a few days  Covid vaccinated  Medication treatment started per COVID protocol:  Remdesivir discontinued   CRP 5 5  Troponin normal, D-dimer elevated at 0 85  CT of chest for PE negative  Procalcitonin normal  Patient was educated and encouraged self proing  Increase activity and mobilization as tolerated  PT/OT as needed    SOFIA (acute kidney injury) ([facility] [address] )  Assessment & Plan  Baseline Cr 0 9-1 1  Cr elevated at 1 41 today  Poor oral intake, likely pre-renal  Gentle IV hydration  Urinalysis negative  Creatinine today at 1 01       Hypokalemia  Assessment & Plan    Replaced  Normalized today at 3 6    * Atrial fibrillation with rapid ventricular response [facility])  Assessment & Plan  Patient presented with shortness of breath with exertion for a few days, acutely worsening today  He feels much more short of breath at night when laying supine  CT revealed mild pulmonary septal thickening, consistent with mild CHF, slow transit of contrast consistent with cardiac failure  TSH negative  BNP 1655  Troponin negative  Patient converted to NSR in the ER with a dose of cardizem  • Admit to telemetry  • Echocardiogram showed EF of 60% with normal valve function  • Card recs:  Started on Toprol-XL 25 mg nightly, CHADS2 vasc = 3, patient started on Eliquis 5 mg twice daily  Follow-up with cardiology in the outpatient setting for further monitoring and testing  • Cardiology recommendations appreciated        Medical Problems     Resolved Problems  Date Reviewed: 12/24/2022   None       Discharging Physician / Practitioner: SARA Boyd  PCP: Henry Gtz María Cedeno DO  Admission Date:   Admission Orders (From admission, onward)     Ordered        12/23/22 [address] [place] [address]  Once                      Discharge Date: 12/24/22    Consultations During Hospital Stay:  · Cardiology    Procedures Performed:   · None    Significant Findings / Test Results:   · COVID-positive      Test Results Pending at Discharge (will require follow up): · None     Outpatient Tests Requested:  · None    Complications: None      Reason for Admission: Shortness of breath    Hospital Course:   Melissa Diaz is a 67 y o  male patient who originally presented to the hospital on 12/23/2022 due to shortness of breath  In ED patient was found to be in A  fib with RVR at a rate of 160  He was given Cardizem and converted to normal sinus rhythm  Pro NT BNP was noted to be 1655, Lasix IV was administered with good response  Patient tested positive to COVID and D-dimer was elevated at 1 84, CTA done revealed no pulmonary embolism  Cardiology was consulted and ordered 2D echo which showed EF of 60% with normal valve function  Patient was started on Toprol-XL 25 mg nightly and Eliquis 5 mg twice daily by cardiology  Will follow up with cardiology in the outpatient setting for further monitoring and testing  Mild pathway for COVID was initiated but patient was on room air with saturation mid to high 90s, hence, Decadron and remdesivir were discontinued  Patient has been discharged home in stable condition  Please see above list of diagnoses and related plan for additional information  Condition at Discharge: stable    Discharge Day Visit / Exam:     Subjective: Patient seen and examined at bedside, reports improvement in symptoms    Denies shortness of breath, chest pain, abdominal pain, lightheadedness      Vitals: Blood Pressure: 133/93 (12/24/22 0844)  Pulse: 69 (12/24/22 1255)  Temperature: 97 6 °F (36 4 °C) (12/24/22 0844)  Temp Source: Oral (12/23/22 1735)  Respirations: 19 (12/24/22 0842)  Height: 5' 6" (167 6 cm) (12/23/22 1735)  Weight - Scale: 72 9 kg (160 lb 12 8 oz) (12/24/22 0600)  SpO2: 98 % (12/24/22 1255)      Physical Exam  Vitals and nursing note reviewed  Constitutional:       General: He is not in acute distress  Appearance: Normal appearance  He is normal weight  HENT:      Right Ear: External ear normal       Left Ear: External ear normal    Eyes:      General: No scleral icterus  Right eye: No discharge  Left eye: No discharge  Cardiovascular:      Rate and Rhythm: Normal rate and regular rhythm  Pulses: Normal pulses  Heart sounds: Normal heart sounds  Pulmonary:      Effort: Pulmonary effort is normal  No respiratory distress  Comments: Decreased breath sounds  Abdominal:      General: Bowel sounds are normal  There is no distension  Palpations: Abdomen is soft  Musculoskeletal:      Right lower leg: No edema  Left lower leg: No edema  Skin:     General: Skin is warm and dry  Capillary Refill: Capillary refill takes less than 2 seconds  Neurological:      General: No focal deficit present  Mental Status: He is alert and oriented to person, place, and time  Mental status is at baseline  Psychiatric:         Mood and Affect: Mood normal       Discussion with Family: Patient declined call to   Discharge instructions/Information to patient and family:   See after visit summary for information provided to patient and family  Provisions for Follow-Up Care:  See after visit summary for information related to follow-up care and any pertinent home health orders  Disposition:   Home    Planned Readmission: No     Discharge Statement:  I spent 30 minutes discharging the patient  This time was spent on the day of discharge  I had direct contact with the patient on the day of discharge   Greater than 50% of the total time was spent examining patient, answering all patient questions, arranging and discussing plan of care with patient as well as directly providing post-discharge instructions  Additional time then spent on discharge activities  Discharge Medications:  See after visit summary for reconciled discharge medications provided to patient and/or family        **Please Note: This note may have been constructed using a voice recognition system**

## 2022-12-24 NOTE — PLAN OF CARE
Problem: PAIN - ADULT  Goal: Verbalizes/displays adequate comfort level or baseline comfort level  Description: Interventions:  - Encourage patient to monitor pain and request assistance  - Assess pain using appropriate pain scale  - Administer analgesics based on type and severity of pain and evaluate response  - Implement non-pharmacological measures as appropriate and evaluate response  - Consider cultural and social influences on pain and pain management  - Notify physician/advanced practitioner if interventions unsuccessful or patient reports new pain  Outcome: Progressing     Problem: INFECTION - ADULT  Goal: Absence or prevention of progression during hospitalization  Description: INTERVENTIONS:  - Assess and monitor for signs and symptoms of infection  - Monitor lab/diagnostic results  - Monitor all insertion sites, i e  indwelling lines, tubes, and drains  - Monitor endotracheal if appropriate and nasal secretions for changes in amount and color  - [place] appropriate cooling/warming therapies per order  - Administer medications as ordered  - Instruct and encourage patient and family to use good hand hygiene technique  - Identify and instruct in appropriate isolation precautions for identified infection/condition  Outcome: Progressing  Goal: Absence of fever/infection during neutropenic period  Description: INTERVENTIONS:  - Monitor WBC    Outcome: Progressing     Problem: SAFETY ADULT  Goal: Patient will remain free of falls  Description: INTERVENTIONS:  - Educate patient/family on patient safety including physical limitations  - Instruct patient to call for assistance with activity   - Consult OT/PT to assist with strengthening/mobility   - Keep Call bell within reach  - Keep bed low and locked with side rails adjusted as appropriate  - Keep care items and personal belongings within reach  - Initiate and maintain comfort rounds  - Make Fall Risk Sign visible to staff  - Offer Toileting every 2 Hours, in advance of need  - Initiate/Maintain bed alarm  - Obtain necessary fall risk management equipment:   - Apply yellow socks and bracelet for high fall risk patients  - Consider moving patient to room near nurses station  Outcome: Progressing  Goal: Maintain or return to baseline ADL function  Description: INTERVENTIONS:  -  Assess patient's ability to carry out ADLs; assess patient's baseline for ADL function and identify physical deficits which impact ability to perform ADLs (bathing, care of mouth/teeth, toileting, grooming, dressing, etc )  - Assess/evaluate cause of self-care deficits   - Assess range of motion  - Assess patient's mobility; develop plan if impaired  - Assess patient's need for assistive devices and provide as appropriate  - Encourage maximum independence but intervene and supervise when necessary  - Involve family in performance of ADLs  - Assess for home care needs following discharge   - Consider OT consult to assist with ADL evaluation and planning for discharge  - Provide patient education as appropriate  Outcome: Progressing  Goal: Maintains/Returns to pre admission functional level  Description: INTERVENTIONS:  - Perform BMAT or MOVE assessment daily    - Set and communicate daily mobility goal to care team and patient/family/caregiver  - Collaborate with rehabilitation services on mobility goals if consulted  - Perform Range of Motion 3 times a day  - Reposition patient every 2 hours    - Dangle patient 3 times a day  - Stand patient 3 times a day  - Ambulate patient 3 times a day  - Out of bed to chair 3 times a day   - Out of bed for meals 3 times a day  - Out of bed for toileting  - Record patient progress and toleration of activity level   Outcome: Progressing     Problem: DISCHARGE PLANNING  Goal: Discharge to home or other facility with appropriate resources  Description: INTERVENTIONS:  - Identify barriers to discharge w/patient and caregiver  - Arrange for needed discharge resources and transportation as appropriate  - Identify discharge learning needs (meds, wound care, etc )  - Arrange for interpretive services to assist at discharge as needed  - Refer to Case Management Department for coordinating discharge planning if the patient needs post-hospital services based on physician/advanced practitioner order or complex needs related to functional status, cognitive ability, or social support system  Outcome: Progressing     Problem: RESPIRATORY - ADULT  Goal: Achieves optimal ventilation and oxygenation  Description: INTERVENTIONS:  - Assess for changes in respiratory status  - Assess for changes in mentation and behavior  - Position to facilitate oxygenation and minimize respiratory effort  - Oxygen administered by appropriate delivery if ordered  - Initiate smoking cessation education as indicated  - Encourage broncho-pulmonary hygiene including cough, deep breathe, Incentive Spirometry  - Assess the need for suctioning and aspirate as needed  - Assess and instruct to report SOB or any respiratory difficulty  - Respiratory Therapy support as indicated  Outcome: Progressing     Problem: Potential for Falls  Goal: Patient will remain free of falls  Description: INTERVENTIONS:  - Educate patient/family on patient safety including physical limitations  - Instruct patient to call for assistance with activity   - Consult OT/PT to assist with strengthening/mobility   - Keep Call bell within reach  - Keep bed low and locked with side rails adjusted as appropriate  - Keep care items and personal belongings within reach  - Initiate and maintain comfort rounds  - Make Fall Risk Sign visible to staff  - Offer Toileting every 2 Hours, in advance of need  - Initiate/Maintain bed alarm  - Obtain necessary fall risk management equipment:   - Apply yellow socks and bracelet for high fall risk patients  - Consider moving patient to room near nurses station  Outcome: Progressing

## 2022-12-24 NOTE — ASSESSMENT & PLAN NOTE
Patient presented with shortness of breath with exertion for a few days, acutely worsening today  He feels much more short of breath at night when laying supine  CT revealed mild pulmonary septal thickening, consistent with mild CHF, slow transit of contrast consistent with cardiac failure  TSH negative  BNP 1655  Troponin negative  Patient converted to NSR in the ER with a dose of cardizem  • Admit to telemetry  • Echocardiogram showed EF of 60% with normal valve function  • Card recs:  Started on Toprol-XL 25 mg nightly, CHADS2 vasc = 3, patient started on Eliquis 5 mg twice daily  Follow-up with cardiology in the outpatient setting for further monitoring and testing  • Cardiology recommendations appreciated

## 2022-12-24 NOTE — ASSESSMENT & PLAN NOTE
Baseline Cr 0 9-1 1  Cr elevated at 1 41 today  Poor oral intake, likely pre-renal  Gentle IV hydration  Urinalysis negative  Creatinine today at 1 01

## 2022-12-24 NOTE — UTILIZATION REVIEW
Initial Clinical Review    Admission: Date/Time/Statement:   Admission Orders (From admission, onward)     Ordered        12/23/22 1336  INPATIENT ADMISSION  Once                      Orders Placed This Encounter   Procedures   • INPATIENT ADMISSION     Standing Status:   Standing     Number of Occurrences:   1     Order Specific Question:   Level of Care     Answer:   Med Surg [16]     Order Specific Question:   Estimated length of stay     Answer:   More than 2 Midnights     Order Specific Question:   Certification     Answer:   I certify that inpatient services are medically necessary for this patient for a duration of greater than two midnights  See H&P and MD Progress Notes for additional information about the patient's course of treatment  ED Arrival Information     Expected   -    Arrival   12/23/2022 11:01    Acuity   Emergent            Means of arrival   Walk-In    Escorted by   Self    Service   Hospitalist    Admission type   Emergency            Arrival complaint   shortness of breath           Chief Complaint   Patient presents with   • Shortness of Breath     Patient tachypneic, speaks very little english  To CT 1  Denies CP, monitor rapid A-fib  Not feeling well for 2-3 weeks  • Rapid Heart Rate       Initial Presentation: 67 y o  male presented ED  with few month history of Shortness of breath  Now with generalized weakness, fatigue and malaise  Screened and found positive for COVID Viral Infection  Patient was placed on the monitor and noted be to in a Rapid Atrial fibrillation of unknown duration  Patient is also noted to have a RBBB  Previous EKG from 2015 did not demonstrate RBBB  Patient has had no previous cardiac testing  PMHx HTN BPH  Impaired fasting glucose  On exam (+) murmur Decreased breath sounds Course scattered breathe sounds Plan Will add low dose Lopressor 12 5 mg q12h    Monitor telemetrys/p IV lasix,ECHO treatment started per COVID protocol:Remdesivir Check and trend inflammatory markers, CRP Check troponin, proBNP, D-dimer Daily CBC, CMP trend procalcitonin Patient was educated and encouraged self proing        Date: 12-24-22  Cardiology consult :  Assessment and Plan:     Paroxysmal Atrial fibrllation of unknown duration: Patient presented with 1 month history of shortness of breath and breathing improved with spontaneous resumption of sinus rhythm    -   Echocardiogram performed on admission demonstrated an EF of 60% with normal valve function     -   Started on Toprol-XL 25 mg nightly    -   CHADS2 vasc = 3, patient started on Eliquis 5 mg twice daily    -   Follow-up with cardiology in the outpatient setting for further monitoring and testing     COVID 19 Viral infection: Managed per primary team     Volume Overload: Secondary to irregular heart rate      -   Patient received Lasix 20 mg yesterday with good response      ED Triage Vitals [12/23/22 1116]   Temperature Pulse Respirations Blood Pressure SpO2   97 5 °F (36 4 °C) (!) 164 (!) 32 121/72 98 %      Temp Source Heart Rate Source Patient Position - Orthostatic VS BP Location FiO2 (%)   Oral Monitor Lying Left arm --      Pain Score       No Pain          Wt Readings from Last 1 Encounters:   12/24/22 72 9 kg (160 lb 12 8 oz)     Additional Vital Signs:   Date/Time Temp Pulse Resp BP MAP (mmHg) SpO2 Calculated FIO2 (%) - Nasal Cannula Nasal Cannula O2 Flow Rate (L/min) O2 Device Patient Position - Orthostatic VS   12/24/22 08:44:04 97 6 °F (36 4 °C) 60 -- 133/93 106 96 % -- -- -- --   12/24/22 08:42:54 -- 64 19 133/93 106 96 % -- -- -- --   12/24/22 04:07:45 -- 54 Abnormal  17 124/88 100 96 % -- -- -- --   12/24/22 00:47:22 -- 63 18 124/89 101 95 % -- -- -- --   12/23/22 23:49:18 98 7 °F (37 1 °C) 57 20 124/87 99 95 % -- -- -- --   12/23/22 20:24:16 -- 57 18 130/90 103 95 % -- -- -- --   12/23/22 17:35:42 97 9 °F (36 6 °C) 61 18 124/88 100 98 % 28 2 L/min Nasal cannula Lying   12/23/22 1600 -- 66 18 142/76 103 96 % -- -- -- --   12/23/22 1530 -- 70 18 126/91 105 95 % -- -- -- --   12/23/22 1430 -- 70 20 143/98 116 96 % -- -- -- --   12/23/22 1330 -- 66 20 122/82 98 96 % -- -- -- --   12/23/22 1300 -- 68 20 121/83 98 96 % -- -- -- --   12/23/22 1200 -- 70 28 Abnormal  126/71 -- 95 % -- -- -- --   12/23/22 1145 -- 134 Abnormal  31 Abnormal  126/71 -- 95 % -- -- --      Pertinent Labs/Diagnostic Test Results:     12-23-22  EKG: Initial 12 lead EKG demonstrated Rapid Atrial Fibrillation with RBBB,  Repeat EKG approximately 20 minutes later, patient had converted back to Sinus Rhythm    CTA ED chest PE study    (12/23 1325)      No pulmonary arterial embolism or pulmonary infiltrate/consolidation  Mild pulmonary interlobular septal thickening likely secondary to mild CHF; cardiomegaly and slow transit of the IV contrast bolus through the cardiac chambers indicates cardiac failure  The study was marked in [facility] for immediate notification  XR chest 1 view portable    (12/23 1451)      No acute cardiopulmonary disease       Results from last 7 days   Lab Units 12/23/22  1129   SARS-COV-2  Positive*     Results from last 7 days   Lab Units 12/24/22  0606 12/23/22  1129   WBC Thousand/uL 6 74 9 56   HEMOGLOBIN g/dL 15 2 16 8   HEMATOCRIT % 46 3 51 3*   PLATELETS Thousands/uL 177 191   NEUTROS ABS Thousands/µL 4 01 6 03         Results from last 7 days   Lab Units 12/24/22  0606 12/23/22  1129   SODIUM mmol/L 141 145   POTASSIUM mmol/L 3 6 3 3*   CHLORIDE mmol/L 106 107   CO2 mmol/L 25 26   ANION GAP mmol/L 10 12   BUN mg/dL 22 24   CREATININE mg/dL 1 01 1 41*   EGFR ml/min/1 73sq m 73 49   CALCIUM mg/dL 8 2* 9 0   MAGNESIUM mg/dL  --  2 1     Results from last 7 days   Lab Units 12/24/22  0606 12/23/22  1129   AST U/L 24 27   ALT U/L 29 34   ALK PHOS U/L 60 62   TOTAL PROTEIN g/dL 6 1* 6 8   ALBUMIN g/dL 2 9* 3 2*   TOTAL BILIRUBIN mg/dL 0 71 0 64         Results from last 7 days   Lab Units 12/24/22  0606 12/23/22  1129   GLUCOSE RANDOM mg/dL 94 109         Results from last 7 days   Lab Units 12/23/22  1129   HEMOGLOBIN A1C % 5 7*   EAG mg/dl 117       Results from last 7 days   Lab Units 12/23/22  1326 12/23/22  1129   HS TNI 0HR ng/L  --  10   HS TNI 2HR ng/L 10  --    HSTNI D2 ng/L 0  --      Results from last 7 days   Lab Units 12/23/22  1129   D-DIMER QUANTITATIVE ug/ml FEU 0 84*     Results from last 7 days   Lab Units 12/23/22  1129   PROTIME seconds 12 9   INR  0 96   PTT seconds 26     Results from last 7 days   Lab Units 12/23/22  1129   TSH 3RD GENERATON uIU/mL 0 886     Results from last 7 days   Lab Units 12/24/22  0606 12/23/22  1601   PROCALCITONIN ng/ml 0 11 0 13       Results from last 7 days   Lab Units 12/23/22  1129   NT-PRO BNP pg/mL 1,655*     Results from last 7 days   Lab Units 12/24/22  0606 12/23/22  1601   CRP mg/L 5 5* 5 1*       Results from last 7 days   Lab Units 12/23/22  1252   CLARITY UA  Clear   COLOR UA  Yellow   SPEC GRAV UA  1 010   PH UA  6 0   GLUCOSE UA mg/dl Negative   KETONES UA mg/dl Negative   BLOOD UA  Negative   PROTEIN UA mg/dl Negative   NITRITE UA  Negative   BILIRUBIN UA  Negative   UROBILINOGEN UA E U /dl 0 2   LEUKOCYTES UA  Negative     Results from last 7 days   Lab Units 12/23/22  1129   INFLUENZA A PCR  Negative   INFLUENZA B PCR  Negative   RSV PCR  Negative       ED Treatment:   Medication Administration from 12/23/2022 1100 to 12/23/2022 1726       Date/Time Order Dose Route Action     12/23/2022 1120 EST diltiazem (CARDIZEM) injection 15 mg 15 mg Intravenous Given     12/23/2022 1130 EST sodium chloride 0 9 % bolus 500 mL 500 mL Intravenous New Bag     12/23/2022 1211 EST iohexol (OMNIPAQUE) 350 MG/ML injection (SINGLE-DOSE) 85 mL 85 mL Intravenous Given     12/23/2022 1248 EST aspirin tablet 325 mg 325 mg Oral Given     12/23/2022 1436 EST potassium chloride (K-DUR,KLOR-CON) CR tablet 40 mEq 40 mEq Oral Given     12/23/2022 1440 EST furosemide (LASIX) injection 20 mg 20 mg Intravenous Given     12/23/2022 1440 EST metoprolol tartrate (LOPRESSOR) partial tablet 12 5 mg 12 5 mg Oral Given        Past Medical History:   Diagnosis Date   • Arthritis     shoulder   • BPH (benign prostatic hyperplasia)    • Disease of thyroid gland     nodules   • Hypertension    • Kidney calculi     left   • Kidney stone    • Nocturia    • Urinary incontinence     During the night, urinary frequency during the day  • Wears glasses      Present on Admission:  • Atrial fibrillation with rapid ventricular response (HCC)      Admitting Diagnosis: CHF (congestive heart failure) (HCC) [I50 9]  Rapid heart rate [R00 0]  SOB (shortness of breath) [R06 02]  Atrial fibrillation with rapid ventricular response (HCC) [I48 91]  COVID-19 [U07 1]  Age/Sex: 67 y o  male  Admission Orders:  Scheduled Medications:  amLODIPine, 10 mg, Oral, Daily  apixaban, 5 mg, Oral, BID  metoprolol succinate, 25 mg, Oral, HS  remdesivir, 100 mg, Intravenous, Q24H  tamsulosin, 0 4 mg, Oral, Daily With Dinner      Continuous IV Infusions:     PRN Meds:       IP CONSULT TO CARDIOLOGY    Network Utilization Review Department  ATTENTION: Please call with any questions or concerns to [phone] and carefully listen to the prompts so that you are directed to the right person  All voicemails are confidential   Juan Jose Arellano all requests for admission clinical reviews, approved or denied determinations and any other requests to dedicated fax number below belonging to the campus where the patient is receiving treatment   List of dedicated fax numbers for the Facilities:  [address] DENIALS (Administrative/Medical Necessity) [phone]   [address] [place] [address] (Maternity/NICU/Pediatrics) [phone]   916 Jennifer Vasquez [address] [phone]   [address] [facility] [address] [place] [address] [facility] [address] [facility] 134 815 [facility] [phone]

## 2022-12-24 NOTE — CASE MANAGEMENT
Case Management Progress Note    Patient name Amparo Petty  Location [address] [facility] 540-* MRN [id number]  : 1950 Date 2022       LOS (days): 1  Geometric Mean LOS (GMLOS) (days):   Days to [address]:        OBJECTIVE:        Current admission status: Inpatient  Preferred Pharmacy:   [facility] [address] ANDRES BECKMAN [facility]  [phone] [address]  Phone: [phone] Fax: [phone]    Primary Care Provider: Gilson Oneal DO    Primary Insurance: [facility]  Secondary Insurance:     PROGRESS NOTE:    CM provided Eliquis coupon to patient  Patient understands to follow up with his cards office for further pricing and discounts

## 2022-12-24 NOTE — ASSESSMENT & PLAN NOTE
Patient reports grandson had a cough two weeks ago  He has been feeling unwell for a few days  Covid vaccinated  Medication treatment started per COVID protocol:  Remdesivir discontinued   CRP 5 5  Troponin normal, D-dimer elevated at 0 85    CT of chest for PE negative  Procalcitonin normal  Patient was educated and encouraged self proing  Increase activity and mobilization as tolerated  PT/OT as needed

## 2022-12-24 NOTE — PLAN OF CARE
Problem: INFECTION - ADULT  Goal: Absence or prevention of progression during hospitalization  Description: INTERVENTIONS:  - Assess and monitor for signs and symptoms of infection  - Monitor lab/diagnostic results  - Monitor all insertion sites, i e  indwelling lines, tubes, and drains  - Monitor endotracheal if appropriate and nasal secretions for changes in amount and color  - [place] appropriate cooling/warming therapies per order  - Administer medications as ordered  - Instruct and encourage patient and family to use good hand hygiene technique  - Identify and instruct in appropriate isolation precautions for identified infection/condition  Outcome: Progressing  Goal: Absence of fever/infection during neutropenic period  Description: INTERVENTIONS:  - Monitor WBC    Outcome: Progressing     Problem: RESPIRATORY - ADULT  Goal: Achieves optimal ventilation and oxygenation  Description: INTERVENTIONS:  - Assess for changes in respiratory status  - Assess for changes in mentation and behavior  - Position to facilitate oxygenation and minimize respiratory effort  - Oxygen administered by appropriate delivery if ordered  - Initiate smoking cessation education as indicated  - Encourage broncho-pulmonary hygiene including cough, deep breathe, Incentive Spirometry  - Assess the need for suctioning and aspirate as needed  - Assess and instruct to report SOB or any respiratory difficulty  - Respiratory Therapy support as indicated  Outcome: Progressing

## 2022-12-25 LAB
ATRIAL RATE: 131 BPM
ATRIAL RATE: 74 BPM
PR INTERVAL: 130 MS
QRS AXIS: -22 DEGREES
QRS AXIS: -24 DEGREES
QRSD INTERVAL: 126 MS
QRSD INTERVAL: 130 MS
QT INTERVAL: 332 MS
QT INTERVAL: 382 MS
QTC INTERVAL: 424 MS
QTC INTERVAL: 469 MS
T WAVE AXIS: -17 DEGREES
T WAVE AXIS: -21 DEGREES
VENTRICULAR RATE: 120 BPM
VENTRICULAR RATE: 74 BPM

## 2022-12-27 ENCOUNTER — TRANSITIONAL CARE MANAGEMENT (OUTPATIENT)
Dept: FAMILY MEDICINE CLINIC | Facility: CLINIC | Age: 72
End: 2022-12-27

## 2022-12-27 NOTE — UTILIZATION REVIEW
NOTIFICATION OF ADMISSION DISCHARGE   This is a Notification of Discharge from [address]  Please be advised that this patient has been discharge from our facility  Below you will find the admission and discharge date and time including the patient’s disposition  UTILIZATION REVIEW CONTACT:  Tavo Feng  Utilization   Network Utilization Review Department  Phone: [phone] x carefully listen to the prompts  All voicemails are confidential   Email: [email or web address]     ADMISSION INFORMATION  PRESENTATION DATE: 12/23/2022 11:11 AM  OBERVATION ADMISSION DATE:   INPATIENT ADMISSION DATE: 12/23/22  1:36 PM   DISCHARGE DATE: 12/24/2022  5:14 PM   DISPOSITION:Home/Self Care    IMPORTANT INFORMATION:  Send all requests for admission clinical reviews, approved or denied determinations and any other requests to dedicated fax number below belonging to the campus where the patient is receiving treatment   List of dedicated fax numbers:  [address] DENIALS (Administrative/Medical Necessity) [phone]   [address] (Maternity/NICU/Pediatrics) [phone]   [facility] [phone]   [facility] [phone]   [address] 134 [phone]   220 [facility] [phone] 90 [facility] [phone]   [address] [place], [place] [address] [phone]   [facility]  [phone]   4054 [facility] [phone]   412 [facility] 850 E [facility] [phone]

## 2022-12-28 LAB
ATRIAL RATE: 170 BPM
QRS AXIS: -19 DEGREES
QRSD INTERVAL: 126 MS
QT INTERVAL: 256 MS
QTC INTERVAL: 410 MS
T WAVE AXIS: -40 DEGREES
VENTRICULAR RATE: 154 BPM

## 2022-12-29 ENCOUNTER — TELEPHONE (OUTPATIENT)
Dept: FAMILY MEDICINE CLINIC | Facility: CLINIC | Age: 72
End: 2022-12-29

## 2023-01-01 PROBLEM — N17.9 AKI (ACUTE KIDNEY INJURY) (HCC): Status: RESOLVED | Noted: 2022-12-23 | Resolved: 2023-01-01

## 2023-01-01 PROBLEM — I48.0 PAF (PAROXYSMAL ATRIAL FIBRILLATION) (HCC): Status: ACTIVE | Noted: 2022-12-23

## 2023-01-06 ENCOUNTER — OFFICE VISIT (OUTPATIENT)
Dept: FAMILY MEDICINE CLINIC | Facility: CLINIC | Age: 73
End: 2023-01-06

## 2023-01-06 VITALS
DIASTOLIC BLOOD PRESSURE: 80 MMHG | TEMPERATURE: 97 F | BODY MASS INDEX: 26.03 KG/M2 | WEIGHT: 162 LBS | SYSTOLIC BLOOD PRESSURE: 120 MMHG | RESPIRATION RATE: 20 BRPM | HEIGHT: 66 IN | HEART RATE: 84 BPM

## 2023-01-06 DIAGNOSIS — I48.0 PAF (PAROXYSMAL ATRIAL FIBRILLATION) (HCC): Primary | ICD-10-CM

## 2023-01-06 DIAGNOSIS — I10 ESSENTIAL HYPERTENSION: ICD-10-CM

## 2023-01-06 DIAGNOSIS — R73.03 PREDIABETES: ICD-10-CM

## 2023-01-06 PROBLEM — R79.89 ELEVATED D-DIMER: Status: RESOLVED | Noted: 2022-12-23 | Resolved: 2023-01-06

## 2023-01-06 PROBLEM — U07.1 COVID: Status: RESOLVED | Noted: 2022-12-23 | Resolved: 2023-01-06

## 2023-01-06 PROBLEM — F17.200 SMOKING: Status: RESOLVED | Noted: 2022-09-12 | Resolved: 2023-01-06

## 2023-01-06 PROBLEM — IMO0001 SMOKING: Status: RESOLVED | Noted: 2022-09-12 | Resolved: 2023-01-06

## 2023-01-06 RX ORDER — AMLODIPINE BESYLATE 10 MG/1
10 TABLET ORAL DAILY
Qty: 90 TABLET | Refills: 1 | Status: SHIPPED | OUTPATIENT
Start: 2023-01-06

## 2023-01-19 DIAGNOSIS — E55.9 VITAMIN D DEFICIENCY: ICD-10-CM

## 2023-01-19 RX ORDER — ERGOCALCIFEROL 1.25 MG/1
50000 CAPSULE ORAL WEEKLY
Qty: 4 CAPSULE | Refills: 0 | Status: SHIPPED | OUTPATIENT
Start: 2023-01-19

## 2023-01-23 ENCOUNTER — HOSPITAL ENCOUNTER (OUTPATIENT)
Dept: RADIOLOGY | Facility: HOSPITAL | Age: 73
Discharge: HOME | End: 2023-01-23

## 2023-01-23 DIAGNOSIS — E04.2 MULTIPLE THYROID NODULES: ICD-10-CM

## 2023-01-25 DIAGNOSIS — I48.91 ATRIAL FIBRILLATION WITH RAPID VENTRICULAR RESPONSE (HCC): ICD-10-CM

## 2023-01-25 RX ORDER — METOPROLOL SUCCINATE 25 MG/1
25 TABLET, EXTENDED RELEASE ORAL
Qty: 30 TABLET | Refills: 0 | Status: SHIPPED | OUTPATIENT
Start: 2023-01-25 | End: 2023-02-08

## 2023-02-07 ENCOUNTER — CONSULT (OUTPATIENT)
Dept: CARDIOLOGY CLINIC | Facility: CLINIC | Age: 73
End: 2023-02-07

## 2023-02-07 ENCOUNTER — OFFICE VISIT (OUTPATIENT)
Dept: OTOLARYNGOLOGY | Facility: CLINIC | Age: 73
End: 2023-02-07

## 2023-02-07 VITALS
HEIGHT: 66 IN | SYSTOLIC BLOOD PRESSURE: 110 MMHG | HEART RATE: 151 BPM | WEIGHT: 167 LBS | OXYGEN SATURATION: 96 % | BODY MASS INDEX: 26.84 KG/M2 | DIASTOLIC BLOOD PRESSURE: 80 MMHG

## 2023-02-07 VITALS — BODY MASS INDEX: 26.84 KG/M2 | TEMPERATURE: 97.8 F | HEIGHT: 66 IN | WEIGHT: 167 LBS

## 2023-02-07 DIAGNOSIS — R73.03 PREDIABETES: ICD-10-CM

## 2023-02-07 DIAGNOSIS — E04.2 MULTIPLE THYROID NODULES: Primary | ICD-10-CM

## 2023-02-07 DIAGNOSIS — H61.23 BILATERAL IMPACTED CERUMEN: ICD-10-CM

## 2023-02-07 DIAGNOSIS — I10 ESSENTIAL HYPERTENSION: ICD-10-CM

## 2023-02-07 DIAGNOSIS — I48.0 PAF (PAROXYSMAL ATRIAL FIBRILLATION) (HCC): Primary | ICD-10-CM

## 2023-02-07 NOTE — PROGRESS NOTES
Assessment/Plan:    Multiple thyroid nodules  Use of  via phone for in depth discussion    TSh level 0 886 - 12/2022  Thyroid US 01/23/2023 indicating   Right lobe: 4 7 x 2 3 x 2 1 cm  Volume 11 1 mL  Left lobe:  4 5 x 1 7 x 1 7 cm  Volume 6 1 mL  Isthmus: 0 5  cm  RIGHT midgland nodule measuring 1 6 x 1 4 x 1 6 cm  Given differences in measuring technique, no significant change from prior  TR 4 (4-6 points), FNA if > 1 5 cm  Follow if > 1cm  LEFT lower pole nodule measuring 1 2 x 0 8 x 1 cm  Given differences in measuring technique, no significant change from prior  TR 3 (3 points), FNA if >2 5 cm  Follow if >1 5 cm  FNAB 03/2022 of the Right nodule indicating [place] III, Afirma Benign    Discussed [place] rating system, EZRA guidelines, and indications for further interventions  Reviewed options for treatment including repeat ultrasound in one year, repeat FNAB of the nodule with Afirma [facility]  Surgery not currently indicated  Based on guidelines for EZRA, thyroid us in year or two years is reasonable due to FNA afirma Benign  After discussion agreed to repeat thyroid US in one year  Follow up after testing January 2024        Bilateral impacted cerumen    On exam noted bilateral cerumen impaction and unable to fully view tympanic membrane  Cerumen impaction removed bilateral eac with alligator forceps, curette, and suction, pt tolerated procedure well  Upon removal, improved hearing and decreased clogged sensation of bilateral ears  Encourage ongoing follow up annually to monitor for cerumen and hearing  Diagnoses and all orders for this visit:    Multiple thyroid nodules  -     US thyroid; Future    Bilateral impacted cerumen  -     Ear cerumen removal          Subjective:      Patient ID: Melissa Diaz is a 67 y o  male  Presents today for follow up due to thyroid nodules and blocked ears  Recent thyroid us and here to review results        Use of  via phone for in depth discussion          The following portions of the patient's history were reviewed and updated as appropriate: allergies, current medications, past family history, past medical history, past social history, past surgical history and problem list     Review of Systems   Constitutional: Negative  HENT: Negative for congestion, ear discharge, ear pain, hearing loss, nosebleeds, postnasal drip, rhinorrhea, sinus pressure, sinus pain, sore throat, tinnitus and voice change  Eyes: Negative  Respiratory: Negative for chest tightness and shortness of breath  Cardiovascular: Negative  Gastrointestinal: Negative  Endocrine: Negative  Musculoskeletal: Negative  Skin: Negative for color change  Neurological: Negative for dizziness, numbness and headaches  Psychiatric/Behavioral: Negative  Objective:      Temp 97 8 °F (36 6 °C) (Temporal)   Ht 5' 6" (1 676 m)   Wt 75 8 kg (167 lb)   BMI 26 95 kg/m²          Physical Exam  Constitutional:       Appearance: He is well-developed  HENT:      Head: Normocephalic  Right Ear: Hearing, tympanic membrane, ear canal and external ear normal  No decreased hearing noted  No drainage or tenderness  There is impacted cerumen  Tympanic membrane is not perforated or erythematous  Left Ear: Hearing, tympanic membrane, ear canal and external ear normal  No decreased hearing noted  No drainage or tenderness  There is impacted cerumen  Tympanic membrane is not perforated or erythematous  Nose: Nose normal  No nasal deformity or septal deviation  Mouth/Throat:      Mouth: Mucous membranes are not pale and not dry  No oral lesions  Dentition: Normal dentition  Pharynx: Uvula midline  No oropharyngeal exudate  Neck:      Trachea: No tracheal deviation  Cardiovascular:      Rate and Rhythm: Normal rate  Pulmonary:      Effort: Pulmonary effort is normal  No accessory muscle usage or respiratory distress  Musculoskeletal:      Right shoulder: Normal range of motion  Cervical back: Full passive range of motion without pain, normal range of motion and neck supple  Lymphadenopathy:      Cervical: No cervical adenopathy  Skin:     General: Skin is warm and dry  Neurological:      Mental Status: He is alert and oriented to person, place, and time  Cranial Nerves: No cranial nerve deficit  Sensory: No sensory deficit  Psychiatric:         Behavior: Behavior is cooperative  Ear cerumen removal    Date/Time: 2/7/2023 10:47 AM  Performed by: SARA Rainey  Authorized by: SARA Rainey   Universal Protocol:  Consent: Verbal consent obtained  Risks and benefits: risks, benefits and alternatives were discussed  Consent given by: patient  Patient understanding: patient states understanding of the procedure being performed      Patient location:  Clinic  Procedure details:     Local anesthetic:  None    Location:  L ear and R ear    Approach:  External  Post-procedure details:     Complication:  None    Hearing quality:  Normal    Patient tolerance of procedure:   Tolerated well, no immediate complications

## 2023-02-07 NOTE — PROGRESS NOTES
Subjective:     Melissa Diaz is a 67 y o  male  who presents to the office today for follow up of recent hospitalization  Patient was admitted to [facility] 31 with COVID 19 infection  In the ER, he was noted to be in atrial fibrillation with RVR  He converted to sinus rhythm after receiving cardizem  BNP was elevated and he was given IV lasix  He felt significantly better the following day  He was discharged home with metoprolol 25 mg daily along with Eliquis  He also uses amlodipine 10 mg daily  Since hospital discharge, he has been feeling well without any chest pain, shortness of breath or palpitations  The following portions of the patient's history were reviewed and updated as appropriate: allergies, current medications, past family history, past medical history, past social history, past surgical history and problem list     Review of Systems  Review of Systems   Respiratory: Negative for chest tightness and shortness of breath  Cardiovascular: Negative for chest pain, palpitations and leg swelling  All other systems reviewed and are negative  Current Outpatient Medications on File Prior to Visit   Medication Sig Dispense Refill   • amLODIPine (NORVASC) 10 mg tablet Take 1 tablet (10 mg total) by mouth daily 90 tablet 1   • apixaban (ELIQUIS) 5 mg Take 1 tablet (5 mg total) by mouth 2 (two) times a day 60 tablet 0   • ergocalciferol (VITAMIN D2) 50,000 units Take 1 capsule (50,000 Units total) by mouth once a week 4 capsule 0   • metoprolol succinate (TOPROL-XL) 25 mg 24 hr tablet Take 1 tablet (25 mg total) by mouth daily at bedtime 30 tablet 0     No current facility-administered medications on file prior to visit            Objective:      Physical Exam  /80 (BP Location: Left arm, Patient Position: Sitting, Cuff Size: Standard)   Pulse (!) 151   Ht 5' 6" (1 676 m)   Wt 75 8 kg (167 lb)   SpO2 96%   BMI 26 95 kg/m²    Physical Exam   Constitutional: He appears healthy  No distress  Eyes: Pupils are equal, round, and reactive to light  Conjunctivae are normal    Neck: No JVD present  Cardiovascular: Normal rate, regular rhythm and normal heart sounds  Exam reveals no gallop and no friction rub  No murmur heard  Pulmonary/Chest: Effort normal and breath sounds normal  He has no wheezes  He has no rales  Musculoskeletal:         General: No tenderness, deformity or edema  Cervical back: Normal range of motion and neck supple  Neurological: He is alert and oriented to person, place, and time  Skin: Skin is warm and dry  Cardiographics  ECG: Atrial fibrillation with RVR with repeat ECG showing sinus rhythm  Echocardiogram: normal and reviewed by myself     Lab Review   Lab Results   Component Value Date    K 3 6 12/24/2022     12/24/2022    CO2 25 12/24/2022    BUN 22 12/24/2022    CREATININE 1 01 12/24/2022    CALCIUM 8 2 (L) 12/24/2022     Lab Results   Component Value Date    WBC 6 74 12/24/2022    HGB 15 2 12/24/2022    HCT 46 3 12/24/2022    MCV 98 12/24/2022     12/24/2022     Lab Results   Component Value Date    TRIG 134 03/08/2022    HDL 96 03/08/2022          Assessment:     1  PAF (paroxysmal atrial fibrillation) ([address] )    2  Essential hypertension    3  Prediabetes      Patient was in atrial fibrillation with RVR upon initial assessment but converted to sinus rhythm spontaneously  He did not have any change in condition and felt no symptoms during atrial fibrillation  Plan:      1  Will increase metoprolol to 50 mg daily   2  Discontinue amlodipine   3  Holter monitor to assess for afib burden    4  Follow up for BP recheck

## 2023-02-08 ENCOUNTER — TELEPHONE (OUTPATIENT)
Dept: CARDIOLOGY CLINIC | Facility: CLINIC | Age: 73
End: 2023-02-08

## 2023-02-08 DIAGNOSIS — I10 ESSENTIAL HYPERTENSION: Primary | ICD-10-CM

## 2023-02-08 DIAGNOSIS — I48.0 PAF (PAROXYSMAL ATRIAL FIBRILLATION) (HCC): ICD-10-CM

## 2023-02-08 RX ORDER — METOPROLOL SUCCINATE 50 MG/1
50 TABLET, EXTENDED RELEASE ORAL DAILY
Qty: 90 TABLET | Refills: 3 | Status: SHIPPED | OUTPATIENT
Start: 2023-02-08 | End: 2023-05-05 | Stop reason: SDUPTHER

## 2023-02-08 NOTE — TELEPHONE ENCOUNTER
Patient called: Felicitas Maxwell did not get the Rx yesterday     Please submit meds to [facility]

## 2023-02-16 ENCOUNTER — HOSPITAL ENCOUNTER (OUTPATIENT)
Dept: NON INVASIVE DIAGNOSTICS | Facility: HOSPITAL | Age: 73
Discharge: HOME | End: 2023-02-16
Attending: INTERNAL MEDICINE

## 2023-02-16 DIAGNOSIS — I48.0 PAF (PAROXYSMAL ATRIAL FIBRILLATION) (HCC): ICD-10-CM

## 2023-02-27 DIAGNOSIS — I48.91 ATRIAL FIBRILLATION WITH RAPID VENTRICULAR RESPONSE (HCC): ICD-10-CM

## 2023-03-10 ENCOUNTER — RA CDI HCC (OUTPATIENT)
Dept: OTHER | Facility: HOSPITAL | Age: 73
End: 2023-03-10

## 2023-03-10 NOTE — PROGRESS NOTES
[address]  coding opportunities       Chart reviewed, no opportunity found: CHART REVIEWED, NO OPPORTUNITY FOUND     No meat for CHF     Patients Insurance     Medicare Insurance: [employer]

## 2023-03-12 PROBLEM — E55.9 VITAMIN D DEFICIENCY: Status: ACTIVE | Noted: 2023-03-12

## 2023-03-12 PROBLEM — R73.03 PREDIABETES: Status: RESOLVED | Noted: 2021-08-18 | Resolved: 2023-03-12

## 2023-03-16 ENCOUNTER — OFFICE VISIT (OUTPATIENT)
Dept: FAMILY MEDICINE CLINIC | Facility: CLINIC | Age: 73
End: 2023-03-16

## 2023-03-16 VITALS
TEMPERATURE: 98.4 F | WEIGHT: 168 LBS | SYSTOLIC BLOOD PRESSURE: 132 MMHG | BODY MASS INDEX: 27.12 KG/M2 | RESPIRATION RATE: 20 BRPM | HEART RATE: 82 BPM | DIASTOLIC BLOOD PRESSURE: 88 MMHG

## 2023-03-16 DIAGNOSIS — E55.9 VITAMIN D DEFICIENCY: ICD-10-CM

## 2023-03-16 DIAGNOSIS — I10 ESSENTIAL HYPERTENSION: ICD-10-CM

## 2023-03-16 DIAGNOSIS — I48.0 PAF (PAROXYSMAL ATRIAL FIBRILLATION) (HCC): Primary | ICD-10-CM

## 2023-03-16 NOTE — PROGRESS NOTES
Assessment/Plan:    No problem-specific Assessment & Plan notes found for this encounter  PAF, continue noac and BB  Cardiology f/u    Boredom, s/w daughter,suggest more socialization and hobbies    Vit D def on drisdol, f/u rheumatology     Diagnoses and all orders for this visit:    PAF (paroxysmal atrial fibrillation) ([facility] [address] )    Essential hypertension    Vitamin D deficiency      s/w daughter Jackelin Bergman on phone during visit  Discussed his feelings and we agreed he is likely not depressed, but seemingly bored  Will watch for future symptoms of depression that may benefit from medication      Return in about 6 months (around 9/16/2023) for Recheck  Subjective:      Patient ID: Shirley Mehta is a 67 y o  male  Chief Complaint   Patient presents with   • Follow-up   • Hypertension     Sas/cma       HPI  Has ex wife he still helps  Sad? No crying  Overall not depressed per pt  Feels isolated  Stays in room and watches tv  Lives with daughter and son in law  Not sure if son in law likes him  Not much to do most days    The following portions of the patient's history were reviewed and updated as appropriate: allergies, current medications, past family history, past medical history, past social history, past surgical history and problem list     Review of Systems   Constitutional: Negative for chills and fever  Current Outpatient Medications   Medication Sig Dispense Refill   • apixaban (ELIQUIS) 5 mg Take 1 tablet (5 mg total) by mouth 2 (two) times a day 60 tablet 0   • ergocalciferol (VITAMIN D2) 50,000 units Take 1 capsule (50,000 Units total) by mouth once a week 4 capsule 0   • metoprolol succinate (TOPROL-XL) 50 mg 24 hr tablet Take 1 tablet (50 mg total) by mouth daily 90 tablet 3     No current facility-administered medications for this visit         Objective:    /88   Pulse 82   Temp 98 4 °F (36 9 °C)   Resp 20   Wt 76 2 kg (168 lb)   BMI 27 12 kg/m²        Physical Exam  Vitals and nursing note reviewed  Constitutional:       General: He is not in acute distress  Appearance: He is well-developed  He is not ill-appearing  HENT:      Head: Normocephalic  Right Ear: Tympanic membrane normal       Left Ear: Tympanic membrane normal    Eyes:      General: No scleral icterus  Conjunctiva/sclera: Conjunctivae normal    Neck:      Vascular: No carotid bruit  Cardiovascular:      Rate and Rhythm: Normal rate and regular rhythm  Pulmonary:      Effort: Pulmonary effort is normal  No respiratory distress  Breath sounds: No wheezing or rales  Abdominal:      Palpations: Abdomen is soft  Musculoskeletal:         General: No deformity  Cervical back: Neck supple  Skin:     General: Skin is warm and dry  Coloration: Skin is not pale  Neurological:      Mental Status: He is alert  Motor: No weakness  Gait: Gait normal    Psychiatric:         Mood and Affect: Mood normal          Behavior: Behavior normal          Thought Content: Thought content normal        BMI Counseling: Body mass index is 27 12 kg/m²  The BMI is above normal  Nutrition recommendations include decreasing portion sizes and moderation in carbohydrate intake  Exercise recommendations include exercising 3-5 times per week  No pharmacotherapy was ordered  Rationale for BMI follow-up plan is due to patient being overweight or obese  Depression Screening and Follow-up Plan: Patient was screened for depression during today's encounter  They screened negative with a PHQ-2 score of 0               Chaitanya Harrell,

## 2023-04-08 PROBLEM — H61.23 BILATERAL IMPACTED CERUMEN: Status: RESOLVED | Noted: 2021-07-21 | Resolved: 2023-04-08

## 2023-05-03 ENCOUNTER — OFFICE VISIT (OUTPATIENT)
Dept: CARDIOLOGY CLINIC | Facility: CLINIC | Age: 73
End: 2023-05-03

## 2023-05-03 ENCOUNTER — TELEPHONE (OUTPATIENT)
Dept: FAMILY MEDICINE CLINIC | Facility: CLINIC | Age: 73
End: 2023-05-03

## 2023-05-03 VITALS
DIASTOLIC BLOOD PRESSURE: 100 MMHG | BODY MASS INDEX: 27 KG/M2 | SYSTOLIC BLOOD PRESSURE: 150 MMHG | HEIGHT: 66 IN | WEIGHT: 168 LBS | OXYGEN SATURATION: 99 % | HEART RATE: 69 BPM

## 2023-05-03 DIAGNOSIS — I10 ESSENTIAL HYPERTENSION: Primary | ICD-10-CM

## 2023-05-03 DIAGNOSIS — R73.03 PREDIABETES: ICD-10-CM

## 2023-05-03 DIAGNOSIS — I48.0 PAF (PAROXYSMAL ATRIAL FIBRILLATION) (HCC): ICD-10-CM

## 2023-05-03 DIAGNOSIS — I10 ESSENTIAL HYPERTENSION: ICD-10-CM

## 2023-05-03 RX ORDER — AMLODIPINE BESYLATE 5 MG/1
5 TABLET ORAL DAILY
Qty: 90 TABLET | Refills: 3 | Status: SHIPPED | OUTPATIENT
Start: 2023-05-03

## 2023-05-03 RX ORDER — METOPROLOL SUCCINATE 50 MG/1
50 TABLET, EXTENDED RELEASE ORAL DAILY
Qty: 90 TABLET | Refills: 0 | Status: CANCELLED | OUTPATIENT
Start: 2023-05-03

## 2023-05-03 NOTE — PROGRESS NOTES
" Subjective:     Gi Magaña is a 67 y o  male  who presents to the office today for follow up of atrial fibrillation and hypertension  In January, [facility] with [facility] infection  In the ER, he was noted to be in atrial fibrillation with RVR  He converted to sinus rhythm after receiving cardizem  BNP was elevated and he was given IV lasix  He felt significantly better the following day  He was discharged home with metoprolol 25 mg daily along with Eliquis along with amlodipine 10 mg daily  During his last visit, metoprolol was increased to 50 mg daily and amlodipine was discontinued  BP elevated today  Holter monitor was done which showed sinus rhythm  The following portions of the patient's history were reviewed and updated as appropriate: allergies, current medications, past family history, past medical history, past social history, past surgical history and problem list     Review of Systems  Review of Systems   Respiratory: Negative for chest tightness  Cardiovascular: Negative for chest pain, palpitations and leg swelling  Musculoskeletal: Positive for arthralgias and myalgias  All other systems reviewed and are negative  Current Outpatient Medications on File Prior to Visit   Medication Sig Dispense Refill    ergocalciferol (VITAMIN D2) 50,000 units Take 1 capsule (50,000 Units total) by mouth once a week 4 capsule 0    metoprolol succinate (TOPROL-XL) 50 mg 24 hr tablet Take 1 tablet (50 mg total) by mouth daily 90 tablet 3     No current facility-administered medications on file prior to visit  Objective:      Physical Exam  /100 (BP Location: Left arm, Patient Position: Sitting, Cuff Size: Standard)   Pulse 69   Ht 5' 6\" (1 676 m)   Wt 76 2 kg (168 lb)   SpO2 99%   BMI 27 12 kg/m²    Physical Exam   Constitutional: He appears healthy  No distress     HENT:   Nose: Nose normal    Mouth/Throat: Dentition is normal  " Oropharynx is clear  Eyes: Pupils are equal, round, and reactive to light  Conjunctivae are normal    Neck: No JVD present  Cardiovascular: Normal rate, regular rhythm and normal heart sounds  Exam reveals no gallop and no friction rub  No murmur heard  Pulmonary/Chest: Effort normal and breath sounds normal  He has no wheezes  He has no rales  Musculoskeletal:         General: No edema  Cervical back: Normal range of motion and neck supple  Neurological: He is alert and oriented to person, place, and time  Skin: Skin is warm and dry  Cardiographics  ECG: NSR with RBBB  Echocardiogram: normal and reviewed by myself     Lab Review   Lab Results   Component Value Date    K 3 6 12/24/2022     12/24/2022    CO2 25 12/24/2022    BUN 22 12/24/2022    CREATININE 1 01 12/24/2022    CALCIUM 8 2 (L) 12/24/2022     Lab Results   Component Value Date    WBC 6 74 12/24/2022    HGB 15 2 12/24/2022    HCT 46 3 12/24/2022    MCV 98 12/24/2022     12/24/2022     Lab Results   Component Value Date    TRIG 134 03/08/2022    HDL 96 03/08/2022          Assessment:     1  Essential hypertension    2  PAF (paroxysmal atrial fibrillation) ([facility] [address] )    3  Prediabetes      Patient was in sinus rhythm throughout monitoring         Plan:      1  Continue metoprolol 50 mg daily   2   BP elevated today  Will restart amlodipine 5 mg daily  3   Holter monitor did not show any atrial fibrillation  4   Follow up for BP recheck

## 2023-05-03 NOTE — TELEPHONE ENCOUNTER
Tried calling pt due to no show on 5/1/2023, mobile number is not working and other number does not have a voicemail set up

## 2023-05-05 DIAGNOSIS — I10 ESSENTIAL HYPERTENSION: ICD-10-CM

## 2023-05-05 DIAGNOSIS — I48.0 PAF (PAROXYSMAL ATRIAL FIBRILLATION) (HCC): ICD-10-CM

## 2023-05-05 RX ORDER — METOPROLOL SUCCINATE 50 MG/1
50 TABLET, EXTENDED RELEASE ORAL DAILY
Qty: 90 TABLET | Refills: 3 | Status: SHIPPED | OUTPATIENT
Start: 2023-05-05

## 2023-06-02 ENCOUNTER — TELEPHONE (OUTPATIENT)
Dept: FAMILY MEDICINE CLINIC | Facility: CLINIC | Age: 73
End: 2023-06-02

## 2023-06-02 ENCOUNTER — OFFICE VISIT (OUTPATIENT)
Dept: FAMILY MEDICINE CLINIC | Facility: CLINIC | Age: 73
End: 2023-06-02

## 2023-06-02 ENCOUNTER — APPOINTMENT (OUTPATIENT)
Dept: RADIOLOGY | Facility: CLINIC | Age: 73
End: 2023-06-02
Payer: COMMERCIAL

## 2023-06-02 VITALS
BODY MASS INDEX: 27.28 KG/M2 | DIASTOLIC BLOOD PRESSURE: 78 MMHG | WEIGHT: 169 LBS | SYSTOLIC BLOOD PRESSURE: 132 MMHG | HEART RATE: 70 BPM | TEMPERATURE: 99.3 F | RESPIRATION RATE: 18 BRPM

## 2023-06-02 DIAGNOSIS — M71.121 SEPTIC BURSITIS OF ELBOW, RIGHT: ICD-10-CM

## 2023-06-02 DIAGNOSIS — I10 ESSENTIAL HYPERTENSION: ICD-10-CM

## 2023-06-02 DIAGNOSIS — J18.9 PNEUMONITIS: ICD-10-CM

## 2023-06-02 DIAGNOSIS — M25.511 ACUTE PAIN OF RIGHT SHOULDER: ICD-10-CM

## 2023-06-02 DIAGNOSIS — M71.121 SEPTIC BURSITIS OF ELBOW, RIGHT: Primary | ICD-10-CM

## 2023-06-02 DIAGNOSIS — I48.0 PAF (PAROXYSMAL ATRIAL FIBRILLATION) (HCC): ICD-10-CM

## 2023-06-02 PROBLEM — J98.4 PNEUMONITIS: Status: ACTIVE | Noted: 2023-06-02

## 2023-06-02 PROCEDURE — 73030 X-RAY EXAM OF SHOULDER: CPT

## 2023-06-02 PROCEDURE — 73080 X-RAY EXAM OF ELBOW: CPT

## 2023-06-02 RX ORDER — CEPHALEXIN 500 MG/1
500 CAPSULE ORAL 3 TIMES DAILY
Qty: 30 CAPSULE | Refills: 0 | Status: SHIPPED | OUTPATIENT
Start: 2023-06-02 | End: 2023-06-12

## 2023-06-02 NOTE — TELEPHONE ENCOUNTER
Radiology called stating patients Xray of right shoulder has immediate findings      Svetlana Hammond, LPN

## 2023-06-02 NOTE — PROGRESS NOTES
Assessment/Plan:    No problem-specific Assessment & Plan notes found for this encounter  Right elbow redness/swelling, cellulitis vs septic bursitis  XR done, joint effusion noted  rom normal  S/w daughter  Take abx and f/u if no better    Right shoulder pain  Figueroa neg  Empty can can  DJD changes on XR seen at [facility] joint  Pneumonitis suspected  No respiratory symptoms  S/w daughter about XR findings  Understands plan to do CXR r/o pneumonitis    Htn/PAF stable    Your right elbow is swollen and red  It is probably bursitis but sometimes it can be caused by infection also, so please take the antibiotic pill called cephalexin for 10 days  Because you fell on it, we will also get a Xray of your right elbow and also your right shoulder  Diagnoses and all orders for this visit:    Septic bursitis of elbow, right  -     cephalexin (KEFLEX) 500 mg capsule; Take 1 capsule (500 mg total) by mouth 3 (three) times a day for 10 days  -     XR elbow 3+ vw right; Future    Essential hypertension    PAF (paroxysmal atrial fibrillation) (HCC)    Acute pain of right shoulder  -     XR shoulder 2+ vw right; Future    Pneumonitis  -     XR chest pa & lateral; Future    Other orders  -     Calcium Citrate-Vitamin D 250 mg-2 5 mcg tablet; Take 1 tablet by mouth 2 (two) times a day      Return if symptoms worsen or fail to improve  Subjective:      Patient ID: Raheel Valdez is a 68 y o  male      Chief Complaint   Patient presents with   • Elbow Injury     Hit it on car              sas/cma       HPI  Karolyn Perry in parking lot  3w ago  Hit elbow   Red  Still some pain  Some shoulder pain  Fingers ok, movt ok     Some motrin used  No fever  No bleeding after fall    The following portions of the patient's history were reviewed and updated as appropriate: allergies, current medications, past family history, past medical history, past social history, past surgical history and problem list     Review of Systems   Constitutional: Negative for fever  Respiratory: Negative for cough and shortness of breath  Current Outpatient Medications   Medication Sig Dispense Refill   • amLODIPine (NORVASC) 5 mg tablet Take 1 tablet (5 mg total) by mouth daily 90 tablet 3   • Calcium Citrate-Vitamin D 250 mg-2 5 mcg tablet Take 1 tablet by mouth 2 (two) times a day     • cephalexin (KEFLEX) 500 mg capsule Take 1 capsule (500 mg total) by mouth 3 (three) times a day for 10 days 30 capsule 0   • ergocalciferol (VITAMIN D2) 50,000 units Take 1 capsule (50,000 Units total) by mouth once a week 4 capsule 0   • metoprolol succinate (TOPROL-XL) 50 mg 24 hr tablet Take 1 tablet (50 mg total) by mouth daily 90 tablet 3     No current facility-administered medications for this visit  Objective:    /78   Pulse 70   Temp 99 3 °F (37 4 °C)   Resp 18   Wt 76 7 kg (169 lb)   BMI 27 28 kg/m²        Physical Exam  Vitals and nursing note reviewed  Constitutional:       General: He is not in acute distress  Appearance: He is well-developed  He is not ill-appearing  HENT:      Head: Normocephalic  Right Ear: Tympanic membrane and ear canal normal       Left Ear: Tympanic membrane and ear canal normal       Nose: No congestion  Mouth/Throat:      Pharynx: No oropharyngeal exudate  Eyes:      General: No scleral icterus  Conjunctiva/sclera: Conjunctivae normal    Neck:      Vascular: No carotid bruit  Cardiovascular:      Rate and Rhythm: Normal rate and regular rhythm  Heart sounds: No murmur heard  Pulmonary:      Effort: Pulmonary effort is normal  No respiratory distress  Breath sounds: No wheezing or rales  Abdominal:      General: There is no distension  Palpations: Abdomen is soft  Tenderness: There is no abdominal tenderness  Musculoskeletal:         General: Swelling present  No deformity  Cervical back: Neck supple  Right lower leg: No edema  Left lower leg: No edema  Comments: Right olecranon redness and slight bogginess of bursa with local induration  rom wnl w/o pain    Figueroa neg on right  Empty can neg  Full shoulder rom   Skin:     General: Skin is warm and dry  Coloration: Skin is not pale  Neurological:      Mental Status: He is alert  Motor: No weakness  Gait: Gait normal    Psychiatric:         Mood and Affect: Mood normal          Behavior: Behavior normal          Thought Content: Thought content normal        41 minutes spent with patient and with chart review and documentation completion             Xuan Nguyễn DO

## 2023-06-02 NOTE — TELEPHONE ENCOUNTER
Patient called asking to speak to Trina Bejarano, he is Chinese speaking   He is asking to be called at [address], LPN

## 2023-06-02 NOTE — TELEPHONE ENCOUNTER
Patient called and left a VM with no msg  I called the patient to follow up on his call  Unable to leave msg as VM is not set up     Brenna Mae MA

## 2023-06-02 NOTE — TELEPHONE ENCOUNTER
Patient was seen in office today  Questions and concerns were addressed with PCP  No further action required     Kerri Mejia MA

## 2023-06-02 NOTE — PATIENT INSTRUCTIONS
Your right elbow is swollen and red  It is probably bursitis but sometimes it can be caused by infection also, so please take the antibiotic pill called cephalexin for 10 days  Because you fell on it, we will also get a Xray of your right elbow and also your right shoulder

## 2023-06-02 NOTE — TELEPHONE ENCOUNTER
I called Annamarie Trujillo, he was returning my call  He says his questions were answered during his appointment today with Dr SINGLETON  No further action required     Joselito Casper MA

## 2023-06-04 ENCOUNTER — APPOINTMENT (OUTPATIENT)
Dept: RADIOLOGY | Facility: CLINIC | Age: 73
End: 2023-06-04
Payer: COMMERCIAL

## 2023-06-04 DIAGNOSIS — J18.9 PNEUMONITIS: ICD-10-CM

## 2023-06-04 PROCEDURE — 71046 X-RAY EXAM CHEST 2 VIEWS: CPT

## 2023-06-06 ENCOUNTER — TELEPHONE (OUTPATIENT)
Dept: FAMILY MEDICINE CLINIC | Facility: CLINIC | Age: 73
End: 2023-06-06

## 2023-06-07 NOTE — TELEPHONE ENCOUNTER
Your chest x-ray is normal    Written by Jaida Velazquez DO on 6/7/2023  1:13 AM EDT  Seen by patient Gia Enamorado on 6/7/2023  6:29 AM

## 2023-07-24 DIAGNOSIS — I10 ESSENTIAL HYPERTENSION: ICD-10-CM

## 2023-07-24 DIAGNOSIS — I48.0 PAF (PAROXYSMAL ATRIAL FIBRILLATION) (HCC): ICD-10-CM

## 2023-07-24 RX ORDER — AMLODIPINE BESYLATE 5 MG/1
5 TABLET ORAL DAILY
Qty: 90 TABLET | Refills: 0 | Status: SHIPPED | OUTPATIENT
Start: 2023-07-24

## 2023-07-24 RX ORDER — METOPROLOL SUCCINATE 50 MG/1
50 TABLET, EXTENDED RELEASE ORAL DAILY
Qty: 90 TABLET | Refills: 0 | Status: SHIPPED | OUTPATIENT
Start: 2023-07-24

## 2023-08-08 ENCOUNTER — OFFICE VISIT (OUTPATIENT)
Dept: CARDIOLOGY CLINIC | Facility: CLINIC | Age: 73
End: 2023-08-08
Payer: COMMERCIAL

## 2023-08-08 VITALS
SYSTOLIC BLOOD PRESSURE: 130 MMHG | OXYGEN SATURATION: 96 % | DIASTOLIC BLOOD PRESSURE: 92 MMHG | BODY MASS INDEX: 26.84 KG/M2 | HEIGHT: 66 IN | HEART RATE: 57 BPM | WEIGHT: 167 LBS

## 2023-08-08 DIAGNOSIS — I48.0 PAF (PAROXYSMAL ATRIAL FIBRILLATION) (HCC): Primary | ICD-10-CM

## 2023-08-08 DIAGNOSIS — R06.02 SHORTNESS OF BREATH: ICD-10-CM

## 2023-08-08 DIAGNOSIS — I10 ESSENTIAL HYPERTENSION: ICD-10-CM

## 2023-08-08 PROCEDURE — 93000 ELECTROCARDIOGRAM COMPLETE: CPT | Performed by: INTERNAL MEDICINE

## 2023-08-08 PROCEDURE — 99214 OFFICE O/P EST MOD 30 MIN: CPT | Performed by: INTERNAL MEDICINE

## 2023-08-08 NOTE — PROGRESS NOTES
Subjective:     Jessica Jain is a 68 y.o. male  who presents to the office today for follow up of atrial fibrillation and hypertension. Recently, he has been feeling shortness of breath with exertion. He primarily notices it as he goes upstairs. He does not have shortness of breath on level ground. Symptoms have been present for the past few years but may be worsening recently. He does not have any chest pain or palpitations. In January 2023, patient was admitted to [facility] with COVID 19 infection. In the ER, he was noted to be in atrial fibrillation with RVR. He converted to sinus rhythm after receiving cardizem. BNP was elevated and he was given IV lasix. He felt significantly better the following day. He was discharged home with metoprolol 25 mg daily along with Eliquis along with amlodipine 10 mg daily. During his last visit, metoprolol was increased to 50 mg daily. Holter monitor was done which showed sinus rhythm. The following portions of the patient's history were reviewed and updated as appropriate: allergies, current medications, past family history, past medical history, past social history, past surgical history and problem list.    Review of Systems  Review of Systems   Respiratory: Positive for shortness of breath. Negative for chest tightness. Cardiovascular: Negative for chest pain, palpitations and leg swelling. Musculoskeletal: Positive for arthralgias. All other systems reviewed and are negative.        Current Outpatient Medications on File Prior to Visit   Medication Sig Dispense Refill   • amLODIPine (NORVASC) 5 mg tablet Take 1 tablet (5 mg total) by mouth daily 90 tablet 0   • Calcium Citrate-Vitamin D 250 mg-2.5 mcg tablet Take 1 tablet by mouth 2 (two) times a day     • ergocalciferol (VITAMIN D2) 50,000 units Take 1 capsule (50,000 Units total) by mouth once a week 4 capsule 0   • metoprolol succinate (TOPROL-XL) 50 mg 24 hr tablet Take 1 tablet (50 mg total) by mouth daily 90 tablet 0     No current facility-administered medications on file prior to visit. Objective:      Physical Exam  /92 (BP Location: Right arm, Patient Position: Sitting, Cuff Size: Standard)   Pulse 57   Ht 5' 6" (1.676 m)   Wt 75.8 kg (167 lb)   SpO2 96%   BMI 26.95 kg/m²    Physical Exam   Constitutional: He appears healthy. No distress. Eyes: Pupils are equal, round, and reactive to light. Conjunctivae are normal.   Neck: No JVD present. Cardiovascular: Normal rate, regular rhythm and normal heart sounds. Exam reveals no gallop and no friction rub. No murmur heard. Pulmonary/Chest: Effort normal and breath sounds normal. He has no wheezes. He has no rales. Musculoskeletal:         General: No tenderness, deformity or edema. Cervical back: Normal range of motion and neck supple. Neurological: He is alert and oriented to person, place, and time. Skin: Skin is warm and dry. Cardiographics  ECG: NSR with RBBB  Echocardiogram: normal and reviewed by myself     Lab Review   Lab Results   Component Value Date    K 3.6 12/24/2022     12/24/2022    CO2 25 12/24/2022    BUN 22 12/24/2022    CREATININE 1.01 12/24/2022    CALCIUM 8.2 (L) 12/24/2022     Lab Results   Component Value Date    WBC 6.74 12/24/2022    HGB 15.2 12/24/2022    HCT 46.3 12/24/2022    MCV 98 12/24/2022     12/24/2022     Lab Results   Component Value Date    TRIG 134 03/08/2022    HDL 96 03/08/2022          Assessment:     1. PAF (paroxysmal atrial fibrillation) ([address])    2. Essential hypertension    3. Shortness of breath      Patient was in sinus rhythm throughout monitoring         Plan:      1. Continue metoprolol 50 mg daily - dyspnea was present prior to beginning metoprolol. 2.  Continue amlodipine 5 mg daily. 3.  Holter monitor did not show any atrial fibrillation.      4.  Will schedule for treadmill stress test for further evaluation of exertional dyspnea.

## 2023-08-24 ENCOUNTER — HOSPITAL ENCOUNTER (OUTPATIENT)
Dept: NON INVASIVE DIAGNOSTICS | Facility: HOSPITAL | Age: 73
Discharge: HOME | End: 2023-08-24
Attending: INTERNAL MEDICINE
Payer: COMMERCIAL

## 2023-08-24 DIAGNOSIS — R06.02 SHORTNESS OF BREATH: ICD-10-CM

## 2023-08-24 LAB
MAX HR PERCENT: 93 %
MAX HR: 137 BPM
RATE PRESSURE PRODUCT: NORMAL
SL CV STRESS RECOVERY BP: NORMAL MMHG
SL CV STRESS RECOVERY HR: 78 BPM
SL CV STRESS RECOVERY O2 SAT: 98 %
SL CV STRESS STAGE REACHED: 1
STRESS ANGINA INDEX: 0
STRESS BASELINE BP: NORMAL MMHG
STRESS BASELINE HR: 79 BPM
STRESS O2 SAT REST: 98 %
STRESS PEAK HR: 137 BPM
STRESS POST ESTIMATED WORKLOAD: 4.6 METS
STRESS POST EXERCISE DUR MIN: 1 MIN
STRESS POST EXERCISE DUR SEC: 31 SEC
STRESS POST O2 SAT PEAK: 98 %
STRESS POST PEAK BP: 130 MMHG

## 2023-08-24 PROCEDURE — 93017 CV STRESS TEST TRACING ONLY: CPT

## 2023-08-24 PROCEDURE — 93016 CV STRESS TEST SUPVJ ONLY: CPT | Performed by: INTERNAL MEDICINE

## 2023-08-24 PROCEDURE — 93018 CV STRESS TEST I&R ONLY: CPT | Performed by: INTERNAL MEDICINE

## 2023-08-24 PROCEDURE — 93005 ELECTROCARDIOGRAM TRACING: CPT

## 2023-08-25 ENCOUNTER — TELEPHONE (OUTPATIENT)
Dept: CARDIOLOGY CLINIC | Facility: CLINIC | Age: 73
End: 2023-08-25

## 2023-08-25 DIAGNOSIS — I10 ESSENTIAL HYPERTENSION: ICD-10-CM

## 2023-08-25 DIAGNOSIS — I48.0 PAF (PAROXYSMAL ATRIAL FIBRILLATION) (HCC): Primary | ICD-10-CM

## 2023-08-25 LAB
ATRIAL RATE: 76 BPM
CHEST PAIN STATEMENT: NORMAL
MAX DIASTOLIC BP: 80 MMHG
MAX HEART RATE: 137 BPM
MAX PREDICTED HEART RATE: 147 BPM
MAX. SYSTOLIC BP: 130 MMHG
P AXIS: 62 DEGREES
PR INTERVAL: 134 MS
PROTOCOL NAME: NORMAL
QRS AXIS: -24 DEGREES
QRSD INTERVAL: 146 MS
QT INTERVAL: 398 MS
QTC INTERVAL: 447 MS
REASON FOR TERMINATION: NORMAL
T WAVE AXIS: 26 DEGREES
TARGET HR FORMULA: NORMAL
TEST INDICATION: NORMAL
TIME IN EXERCISE PHASE: NORMAL
VENTRICULAR RATE: 76 BPM

## 2023-08-25 PROCEDURE — 93010 ELECTROCARDIOGRAM REPORT: CPT | Performed by: INTERNAL MEDICINE

## 2023-08-25 NOTE — TELEPHONE ENCOUNTER
----- Message from Dawna Harvey LPN sent at 3/33/3640  4:37 PM EDT -----    ----- Message -----  From: SARA Yo  Sent: 8/24/2023   4:34 PM EDT  To: Dawna Harvey LPN    Exercise nonnuclear stress test using a standard [place] protocol did not demonstrate any significant EKG changes and was considered nonischemic, unfortunately patient only exercised for 1 minute and 31 seconds achieving a maximal heart rate of 137 bpm or 93% of the maximum heart rate. He did not have any symptoms except for dyspnea and fatigue. This demonstrates severely diminished exercise capacity. Patient would benefit from structured exercise such as cardiac rehab if he is interested.

## 2023-10-12 ENCOUNTER — TELEPHONE (OUTPATIENT)
Dept: FAMILY MEDICINE CLINIC | Facility: CLINIC | Age: 73
End: 2023-10-12

## 2023-10-12 NOTE — TELEPHONE ENCOUNTER
Left message for pt to call back.   Need to schedule awv    Schedule as OVL- awv in notes     [address]

## 2023-10-22 DIAGNOSIS — I10 ESSENTIAL HYPERTENSION: ICD-10-CM

## 2023-10-22 DIAGNOSIS — I48.0 PAF (PAROXYSMAL ATRIAL FIBRILLATION) (HCC): ICD-10-CM

## 2023-10-23 RX ORDER — AMLODIPINE BESYLATE 5 MG/1
5 TABLET ORAL DAILY
Qty: 90 TABLET | Refills: 0 | Status: SHIPPED | OUTPATIENT
Start: 2023-10-23

## 2023-10-23 RX ORDER — METOPROLOL SUCCINATE 50 MG/1
50 TABLET, EXTENDED RELEASE ORAL DAILY
Qty: 90 TABLET | Refills: 0 | Status: SHIPPED | OUTPATIENT
Start: 2023-10-23

## 2023-10-29 PROBLEM — M71.121 SEPTIC BURSITIS OF ELBOW, RIGHT: Status: RESOLVED | Noted: 2023-06-02 | Resolved: 2023-10-29

## 2023-10-29 PROBLEM — E87.6 HYPOKALEMIA: Status: RESOLVED | Noted: 2021-07-21 | Resolved: 2023-10-29

## 2023-10-30 ENCOUNTER — OFFICE VISIT (OUTPATIENT)
Dept: FAMILY MEDICINE CLINIC | Facility: CLINIC | Age: 73
End: 2023-10-30
Payer: COMMERCIAL

## 2023-10-30 VITALS
RESPIRATION RATE: 17 BRPM | DIASTOLIC BLOOD PRESSURE: 88 MMHG | HEART RATE: 66 BPM | WEIGHT: 166.2 LBS | HEIGHT: 66 IN | SYSTOLIC BLOOD PRESSURE: 130 MMHG | TEMPERATURE: 97.1 F | BODY MASS INDEX: 26.71 KG/M2

## 2023-10-30 DIAGNOSIS — K42.9 UMBILICAL HERNIA WITHOUT OBSTRUCTION AND WITHOUT GANGRENE: ICD-10-CM

## 2023-10-30 DIAGNOSIS — I48.0 PAF (PAROXYSMAL ATRIAL FIBRILLATION) (HCC): ICD-10-CM

## 2023-10-30 DIAGNOSIS — Z13.0 SCREENING FOR DEFICIENCY ANEMIA: ICD-10-CM

## 2023-10-30 DIAGNOSIS — Z00.00 MEDICARE ANNUAL WELLNESS VISIT, SUBSEQUENT: Primary | ICD-10-CM

## 2023-10-30 DIAGNOSIS — E55.9 VITAMIN D DEFICIENCY: ICD-10-CM

## 2023-10-30 DIAGNOSIS — Z13.83 SCREENING FOR CARDIOVASCULAR, RESPIRATORY, AND GENITOURINARY DISEASES: ICD-10-CM

## 2023-10-30 DIAGNOSIS — I10 ESSENTIAL HYPERTENSION: ICD-10-CM

## 2023-10-30 DIAGNOSIS — Z13.6 SCREENING FOR CARDIOVASCULAR, RESPIRATORY, AND GENITOURINARY DISEASES: ICD-10-CM

## 2023-10-30 DIAGNOSIS — Z12.5 PROSTATE CANCER SCREENING: ICD-10-CM

## 2023-10-30 DIAGNOSIS — Z13.1 SCREENING FOR DIABETES MELLITUS (DM): ICD-10-CM

## 2023-10-30 DIAGNOSIS — Z13.89 SCREENING FOR CARDIOVASCULAR, RESPIRATORY, AND GENITOURINARY DISEASES: ICD-10-CM

## 2023-10-30 PROBLEM — J98.4 PNEUMONITIS: Status: RESOLVED | Noted: 2023-06-02 | Resolved: 2023-10-30

## 2023-10-30 PROCEDURE — G0439 PPPS, SUBSEQ VISIT: HCPCS | Performed by: FAMILY MEDICINE

## 2023-10-30 NOTE — PROGRESS NOTES
Assessment/Plan:    No problem-specific Assessment & Plan notes found for this encounter. Diagnoses and all orders for this visit:    Medicare annual wellness visit, subsequent    Prostate cancer screening  -     PSA, Total Screen; Future    Screening for deficiency anemia  -     CBC and differential; Future    Screening for cardiovascular, respiratory, and genitourinary diseases  -     Lipid Panel with Direct LDL reflex; Future    Screening for diabetes mellitus (DM)  -     Comprehensive metabolic panel; Future    Vitamin D deficiency  -     Vitamin D 25 hydroxy; Future    Umbilical hernia without obstruction and without gangrene    PAF (paroxysmal atrial fibrillation) (HCC)    Essential hypertension          Htn/PAF stable, f/u with cardiology    No follow-ups on file. Subjective:      Patient ID: Lory Stafford is a 68 y.o. male. Chief Complaint   Patient presents with    Medicare Wellness Visit     Jaylon George LPN         HPI  Here for awv  Htn meds from cardiologist  Cooks some  Has a dog  No social outings  Not much social interaction  Bored but not depressed    Some trouble sleeping  Sleeps at [address] [place] [address] [phone] around [address] [facility] [address] throughout night    Walks about 4-5x/wk  Drinks mostly water    The following portions of the patient's history were reviewed and updated as appropriate: allergies, current medications, past family history, past medical history, past social history, past surgical history and problem list.    Review of Systems   Respiratory:  Negative for shortness of breath. Cardiovascular:  Negative for chest pain.          Current Outpatient Medications   Medication Sig Dispense Refill    amLODIPine (NORVASC) 5 mg tablet Take 1 tablet (5 mg total) by mouth daily 90 tablet 0    Calcium Citrate-Vitamin D 250 mg-2.5 mcg tablet Take 1 tablet by mouth 2 (two) times a day      ergocalciferol (VITAMIN D2) 50,000 units Take 1 capsule (50,000 Units total) by mouth once a week 4 capsule 0 metoprolol succinate (TOPROL-XL) 50 mg 24 hr tablet Take 1 tablet (50 mg total) by mouth daily 90 tablet 0     No current facility-administered medications for this visit. Objective:    /88   Pulse 66   Temp (!) 97.1 °F (36.2 °C)   Resp 17   Ht 5' 6" (1.676 m)   Wt 75.4 kg (166 lb 3.2 oz)   BMI 26.83 kg/m²        Physical Exam  Vitals and nursing note reviewed. Constitutional:       General: He is not in acute distress. Appearance: He is well-developed. He is not ill-appearing. HENT:      Head: Normocephalic. Right Ear: Tympanic membrane normal.      Left Ear: Tympanic membrane normal.      Mouth/Throat:      Mouth: Mucous membranes are moist.   Eyes:      General: No scleral icterus. Conjunctiva/sclera: Conjunctivae normal.   Neck:      Vascular: No carotid bruit. Cardiovascular:      Rate and Rhythm: Normal rate and regular rhythm. Heart sounds: No murmur heard. Pulmonary:      Effort: Pulmonary effort is normal. No respiratory distress. Breath sounds: No wheezing. Abdominal:      General: There is no distension. Palpations: Abdomen is soft. Tenderness: There is no abdominal tenderness. Hernia: No hernia is present. Genitourinary:     Penis: Normal.       Testes: Normal.      Prostate: Normal.      Rectum: Normal.   Musculoskeletal:         General: No deformity. Cervical back: Neck supple. Right lower leg: No edema. Left lower leg: No edema. Skin:     General: Skin is warm and dry. Coloration: Skin is not pale. Neurological:      Mental Status: He is alert. Motor: No weakness. Gait: Gait normal.   Psychiatric:         Mood and Affect: Mood normal.         Behavior: Behavior normal.         Thought Content: Thought content normal.         BMI Counseling: Body mass index is 26.83 kg/m². The BMI is above normal. Nutrition recommendations include decreasing portion sizes and moderation in carbohydrate intake. Exercise recommendations include exercising 3-5 times per week. No pharmacotherapy was ordered. Rationale for BMI follow-up plan is due to patient being overweight or obese.             Bevely [place], DO

## 2023-10-30 NOTE — PATIENT INSTRUCTIONS
SHINGRIX is the new, more effective vaccine for Shingles. It is more than 90% effective. You should check with your local pharmacy and insurance company for availability and coverage. It is a 2 shot series, with the second shot given between 2-6 months after the first, and is approved for ages 48 and up. Medicare Preventive Visit Patient Instructions  Thank you for completing your Welcome to Medicare Visit or Medicare Annual Wellness Visit today. Your next wellness visit will be due in one year (10/30/2024). The screening/preventive services that you may require over the next 5-10 years are detailed below. Some tests may not apply to you based off risk factors and/or age. Screening tests ordered at today's visit but not completed yet may show as past due. Also, please note that scanned in results may not display below. Preventive Screenings:  Service Recommendations Previous Testing/Comments   Colorectal Cancer Screening  Colonoscopy    Fecal Occult Blood Test (FOBT)/Fecal Immunochemical Test (FIT)  Fecal DNA/Cologuard Test  Flexible Sigmoidoscopy Age: 43-73 years old   Colonoscopy: every 10 years (May be performed more frequently if at higher risk)  OR  FOBT/FIT: every 1 year  OR  Cologuard: every 3 years  OR  Sigmoidoscopy: every 5 years  Screening may be recommended earlier than age 39 if at higher risk for colorectal cancer. Also, an individualized decision between you and your healthcare provider will decide whether screening between the ages of 77-80 would be appropriate.  Colonoscopy: 09/12/2022  FOBT/FIT: Not on file  Cologuard: Not on file  Sigmoidoscopy: Not on file          Prostate Cancer Screening Individualized decision between patient and health care provider in men between ages of 53-66   Medicare will cover every 12 months beginning on the day after your 50th birthday PSA: 1.5 ng/mL           Hepatitis C Screening Once for adults born between 1945 and 1965  More frequently in patients at high risk for Hepatitis C Hep C Antibody: 03/08/2022        Diabetes Screening 1-2 times per year if you're at risk for diabetes or have pre-diabetes Fasting glucose: 100 mg/dL (3/8/2022)  A1C: 5.7 % (12/23/2022)      Cholesterol Screening Once every 5 years if you don't have a lipid disorder. May order more often based on risk factors. Lipid panel: 03/08/2022         Other Preventive Screenings Covered by Medicare:  Abdominal Aortic Aneurysm (AAA) Screening: covered once if your at risk. You're considered to be at risk if you have a family history of AAA or a male between the age of 70-76 who smoking at least 100 cigarettes in your lifetime. Lung Cancer Screening: covers low dose CT scan once per year if you meet all of the following conditions: (1) Age 48-67; (2) No signs or symptoms of lung cancer; (3) Current smoker or have quit smoking within the last 15 years; (4) You have a tobacco smoking history of at least 20 pack years (packs per day x number of years you smoked); (5) You get a written order from a healthcare provider. Glaucoma Screening: covered annually if you're considered high risk: (1) You have diabetes OR (2) Family history of glaucoma OR (3)  aged 48 and older OR (3)  American aged 72 and older  Osteoporosis Screening: covered every 2 years if you meet one of the following conditions: (1) Have a vertebral abnormality; (2) On glucocorticoid therapy for more than 3 months; (3) Have primary hyperparathyroidism; (4) On osteoporosis medications and need to assess response to drug therapy. HIV Screening: covered annually if you're between the age of 14-79. Also covered annually if you are younger than 13 and older than 72 with risk factors for HIV infection. For pregnant patients, it is covered up to 3 times per pregnancy.     Immunizations:  Immunization Recommendations   Influenza Vaccine Annual influenza vaccination during flu season is recommended for all persons aged >= 6 months who do not have contraindications   Pneumococcal Vaccine   * Pneumococcal conjugate vaccine = PCV13 (Prevnar 13), PCV15 (Vaxneuvance), PCV20 (Prevnar 20)  * Pneumococcal polysaccharide vaccine = PPSV23 (Pneumovax) Adults 73-81 yo with certain risk factors or if 69+ yo  If never received any pneumonia vaccine: recommend Prevnar 20 (PCV20)  Give PCV20 if previously received 1 dose of PCV13 or PPSV23   Hepatitis B Vaccine 3 dose series if at intermediate or high risk (ex: diabetes, end stage renal disease, liver disease)   Respiratory syncytial virus (RSV) Vaccine - COVERED BY MEDICARE PART D  * RSVPreF3 (Arexvy) CDC recommends that adults 61years of age and older may receive a single dose of RSV vaccine using shared clinical decision-making (SCDM)   Tetanus (Td) Vaccine - COST NOT COVERED BY MEDICARE PART B Following completion of primary series, a booster dose should be given every 10 years to maintain immunity against tetanus. Td may also be given as tetanus wound prophylaxis. Tdap Vaccine - COST NOT COVERED BY MEDICARE PART B Recommended at least once for all adults. For pregnant patients, recommended with each pregnancy. Shingles Vaccine (Shingrix) - COST NOT COVERED BY MEDICARE PART B  2 shot series recommended in those 19 years and older who have or will have weakened immune systems or those 50 years and older     Health Maintenance Due:      Topic Date Due   • Colorectal Cancer Screening  09/11/2025   • Hepatitis C Screening  Completed     Immunizations Due:      Topic Date Due   • COVID-19 Vaccine (5 - Pfizer series) 05/26/2022     Advance Directives   What are advance directives? Advance directives are legal documents that state your wishes and plans for medical care. These plans are made ahead of time in case you lose your ability to make decisions for yourself. Advance directives can apply to any medical decision, such as the treatments you want, and if you want to donate organs.    What are the types of advance directives? There are many types of advance directives, and each state has rules about how to use them. You may choose a combination of any of the following:  Living will: This is a written record of the treatment you want. You can also choose which treatments you do not want, which to limit, and which to stop at a certain time. This includes surgery, medicine, IV fluid, and tube feedings. Durable power of  for [facility]): This is a written record that states who you want to make healthcare choices for you when you are unable to make them for yourself. This person, called a proxy, is usually a family member or a friend. You may choose more than 1 proxy. Do not resuscitate (DNR) order:  A DNR order is used in case your heart stops beating or you stop breathing. It is a request not to have certain forms of treatment, such as CPR. A DNR order may be included in other types of advance directives. Medical directive: This covers the care that you want if you are in a coma, near death, or unable to make decisions for yourself. You can list the treatments you want for each condition. Treatment may include pain medicine, surgery, blood transfusions, dialysis, IV or tube feedings, and a ventilator (breathing machine). Values history: This document has questions about your views, beliefs, and how you feel and think about life. This information can help others choose the care that you would choose. Why are advance directives important? An advance directive helps you control your care. Although spoken wishes may be used, it is better to have your wishes written down. Spoken wishes can be misunderstood, or not followed. Treatments may be given even if you do not want them. An advance directive may make it easier for your family to make difficult choices about your care.    Weight Management   Why it is important to manage your weight:  Being overweight increases your risk of health conditions such as heart disease, high blood pressure, type 2 diabetes, and certain types of cancer. It can also increase your risk for osteoarthritis, sleep apnea, and other respiratory problems. Aim for a slow, steady weight loss. Even a small amount of weight loss can lower your risk of health problems. How to lose weight safely:  A safe and healthy way to lose weight is to eat fewer calories and get regular exercise. You can lose up about 1 pound a week by decreasing the number of calories you eat by 500 calories each day. Healthy meal plan for weight management:  A healthy meal plan includes a variety of foods, contains fewer calories, and helps you stay healthy. A healthy meal plan includes the following:  Eat whole-grain foods more often. A healthy meal plan should contain fiber. Fiber is the part of grains, fruits, and vegetables that is not broken down by your body. Whole-grain foods are healthy and provide extra fiber in your diet. Some examples of whole-grain foods are whole-wheat breads and pastas, oatmeal, brown rice, and bulgur. Eat a variety of vegetables every day. Include dark, leafy greens such as spinach, kale, yan greens, and mustard greens. Eat yellow and orange vegetables such as carrots, sweet potatoes, and winter squash. Eat a variety of fruits every day. Choose fresh or canned fruit (canned in its own juice or light syrup) instead of juice. Fruit juice has very little or no fiber. Eat low-fat dairy foods. Drink fat-free (skim) milk or 1% milk. Eat fat-free yogurt and low-fat cottage cheese. Try low-fat cheeses such as mozzarella and other reduced-fat cheeses. Choose meat and other protein foods that are low in fat. Choose beans or other legumes such as split peas or lentils. Choose fish, skinless poultry (chicken or turkey), or lean cuts of red meat (beef or pork). Before you cook meat or poultry, cut off any visible fat. Use less fat and oil.   Try baking foods instead of frying them. Add less fat, such as margarine, sour cream, regular salad dressing and mayonnaise to foods. Eat fewer high-fat foods. Some examples of high-fat foods include french fries, doughnuts, ice cream, and cakes. Eat fewer sweets. Limit foods and drinks that are high in sugar. This includes candy, cookies, regular soda, and sweetened drinks. Exercise:  Exercise at least 30 minutes per day on most days of the week. Some examples of exercise include walking, biking, dancing, and swimming. You can also fit in more physical activity by taking the stairs instead of the elevator or parking farther away from stores. Ask your healthcare provider about the best exercise plan for you. © Copyright [employer] 2018 Information is for End User's use only and may not be sold, redistributed or otherwise used for commercial purposes.  All illustrations and images included in CareNotes® are the copyrighted property of A.D.A[employer]M., Inc. or [address] [place] [address]

## 2023-10-31 NOTE — PROGRESS NOTES
Assessment and Plan:     Problem List Items Addressed This Visit          Active Problems    Medicare annual wellness visit, subsequent - Primary    Prostate cancer screening    Relevant Orders    PSA, Total Screen    Umbilical hernia without obstruction and without gangrene    Vitamin D deficiency    Relevant Orders    Vitamin D 25 hydroxy    Essential hypertension    PAF (paroxysmal atrial fibrillation) ([address])     Other Visit Diagnoses       Screening for deficiency anemia        Relevant Orders    CBC and differential    Screening for cardiovascular, respiratory, and genitourinary diseases        Relevant Orders    Lipid Panel with Direct LDL reflex    Screening for diabetes mellitus (DM)        Relevant Orders    Comprehensive metabolic panel             Preventive health issues were discussed with patient, and age appropriate screening tests were ordered as noted in patient's After Visit Summary. Personalized health advice and appropriate referrals for health education or preventive services given if needed, as noted in patient's After Visit Summary.      History of Present Illness:     Patient presents for a Medicare Wellness Visit    HPI   Patient Care Team:  Alonso Hernandez DO as PCP - General (Family Medicine)  Alonso Hernandez DO as PCP - [address] [place] [address] (RTE)     Review of Systems:     Review of Systems     Problem List:     Patient Active Problem List   Diagnosis    Ureterolithiasis    BPH (benign prostatic hyperplasia)    Adrenal adenoma, left    Liver cyst    Colon, diverticulosis    Chronic pain of left ankle    Immunization due    History of tobacco abuse    Multiple thyroid nodules    Personal history of colonic polyps    PAF (paroxysmal atrial fibrillation) ([address])    Essential hypertension    Vitamin D deficiency    Acute pain of right shoulder    Medicare annual wellness visit, subsequent    Prostate cancer screening    Umbilical hernia without obstruction and without gangrene    BMI 26.0-26.9,adult      Past Medical and Surgical History:     Past Medical History:   Diagnosis Date    Arthritis     shoulder    BPH (benign prostatic hyperplasia)     Disease of thyroid gland     nodules    Hypertension     Kidney calculi     left    Kidney stone     Nocturia     Urinary incontinence     During the night, urinary frequency during the day. Wears glasses      Past Surgical History:   Procedure Laterality Date    COLONOSCOPY      EXTRACORPOREAL SHOCK WAVE LITHOTRIPSY      FRACTURE SURGERY Right     ankle with hardware    MA CYSTO/URETERO W/LITHOTRIPSY &INDWELL STENT INSRT Left 2021    Procedure: CYSTOSCOPY URETEROSCOPY WITH LITHOTRIPSY HOLMIUM LASER, RETROGRADE PYELOGRAM AND INSERTION STENT URETERAL;  Surgeon: Jessica Yarbrough MD;  Location: [facility];  Service: Urology    TRANSURETHRAL RESECTION OF PROSTATE      US GUIDED THYROID BIOPSY  10/25/2021    US GUIDED THYROID BIOPSY  2022      Family History:     History reviewed. No pertinent family history.    Social History:     Social History     Socioeconomic History    Marital status:      Spouse name: None    Number of children: None    Years of education: None    Highest education level: None   Occupational History    None   Tobacco Use    Smoking status: Former     Packs/day: 0.50     Years: 41.00     Total pack years: 20.50     Types: Cigarettes     Start date: 1964     Quit date: 2005     Years since quittin.8    Smokeless tobacco: Never   Vaping Use    Vaping Use: Never used   Substance and Sexual Activity    Alcohol use: Not Currently     Comment: rare    Drug use: No    Sexual activity: None   Other Topics Concern    None   Social History Narrative    None     Social Determinants of Health     Financial Resource Strain: Low Risk  (10/30/2023)    Overall Financial Resource Strain (CARDIA)     Difficulty of Paying Living Expenses: Not very hard   Food Insecurity: Not on file   Transportation Needs: No Transportation Needs (10/30/2023)    PRAPARE - Transportation     Lack of Transportation (Medical): No     Lack of Transportation (Non-Medical): No   Physical Activity: Not on file   Stress: Not on file   Social Connections: Not on file   Intimate Partner Violence: Not on file   Housing Stability: Not on file      Medications and Allergies:     Current Outpatient Medications   Medication Sig Dispense Refill    amLODIPine (NORVASC) 5 mg tablet Take 1 tablet (5 mg total) by mouth daily 90 tablet 0    Calcium Citrate-Vitamin D 250 mg-2.5 mcg tablet Take 1 tablet by mouth 2 (two) times a day      ergocalciferol (VITAMIN D2) 50,000 units Take 1 capsule (50,000 Units total) by mouth once a week 4 capsule 0    metoprolol succinate (TOPROL-XL) 50 mg 24 hr tablet Take 1 tablet (50 mg total) by mouth daily 90 tablet 0     No current facility-administered medications for this visit. No Known Allergies   Immunizations:     Immunization History   Administered Date(s) Administered    COVID-19 PFIZER VACCINE 0.3 ML IM 03/04/2021, 03/25/2021, 03/31/2022    COVID-19 Pfizer vac (Carlos-sucrose, gray cap) 12 yr+ IM 09/27/2021    INFLUENZA 10/07/2021, 10/08/2022, 10/07/2023    Influenza Split High Dose Preservative Free IM 09/23/2019    Pneumococcal Conjugate 13-Valent 09/23/2019    Pneumococcal Polysaccharide PPV23 08/18/2021      Health Maintenance:         Topic Date Due    Colorectal Cancer Screening  09/11/2025    Hepatitis C Screening  Completed         Topic Date Due    COVID-19 Vaccine (5 - Pfizer series) 05/26/2022      Medicare Screening Tests and Risk Assessments:     Dayna Romero is here for his Subsequent Wellness visit. Last Medicare Wellness visit information reviewed, patient interviewed and updates made to the record today. Health Risk Assessment:   Patient rates overall health as very good. Patient feels that their physical health rating is slightly better. Patient is very satisfied with their life.  Eyesight was rated as same. Hearing was rated as same. Patient feels that their emotional and mental health rating is slightly better. Patients states they are never, rarely angry. Patient states they are often unusually tired/fatigued. Pain experienced in the last 7 days has been a lot. Patient's pain rating has been 5/10. Patient states that he has experienced no weight loss or gain in last 6 months. Depression Screening:   PHQ-2 Score: 0      Fall Risk Screening: In the past year, patient has experienced: no history of falling in past year      Home Safety:  Patient does not have trouble with stairs inside or outside of their home. Patient has working smoke alarms and has working carbon monoxide detector. Home safety hazards include: none. Nutrition:   Current diet is No Added Salt and Low Saturated Fat. Medications:   Patient is not currently taking any over-the-counter supplements. Patient is able to manage medications. Activities of Daily Living (ADLs)/Instrumental Activities of Daily Living (IADLs):   Walk and transfer into and out of bed and chair?: Yes  Dress and groom yourself?: Yes    Bathe or shower yourself?: Yes    Feed yourself? Yes  Do your laundry/housekeeping?: Yes  Manage your money, pay your bills and track your expenses?: Yes  Make your own meals?: Yes    Do your own shopping?: Yes    Previous Hospitalizations:   Any hospitalizations or ED visits within the last 12 months?: No      Advance Care Planning:   Living will: Yes    Durable POA for healthcare:  Yes    Advanced directive: Yes    End of Life Decisions reviewed with patient: No      Cognitive Screening:   Provider or family/friend/caregiver concerned regarding cognition?: No    PREVENTIVE SCREENINGS      Cardiovascular Screening:    General: Screening Current      Diabetes Screening:     General: Screening Current      Colorectal Cancer Screening:     General: Screening Current      Prostate Cancer Screening:    General: History Prostate Cancer and Screening Not Indicated      Osteoporosis Screening:    General: Screening Not Indicated      Abdominal Aortic Aneurysm (AAA) Screening:    Risk factors include: age between 70-77 yo and tobacco use        General: Screening Not Indicated      Lung Cancer Screening:     General: Screening Not Indicated      Hepatitis C Screening:    General: Screening Current    Hep C Screening Accepted: No     Screening, Brief Intervention, and Referral to Treatment (SBIRT)    Screening  Typical number of drinks in a day: 0  Typical number of drinks in a week: 0  Interpretation: Low risk drinking behavior. Single Item Drug Screening:  How often have you used an illegal drug (including marijuana) or a prescription medication for non-medical reasons in the past year? never    Single Item Drug Screen Score: 0  Interpretation: Negative screen for possible drug use disorder    Other Counseling Topics:   Car/seat belt/driving safety and regular weightbearing exercise. No results found.      Physical Exam:     /88   Pulse 66   Temp (!) 97.1 °F (36.2 °C)   Resp 17   Ht 5' 6" (1.676 m)   Wt 75.4 kg (166 lb 3.2 oz)   BMI 26.83 kg/m²     Physical Exam     Denisha Horner,

## 2023-12-05 DIAGNOSIS — I10 ESSENTIAL HYPERTENSION: ICD-10-CM

## 2023-12-05 DIAGNOSIS — I48.0 PAF (PAROXYSMAL ATRIAL FIBRILLATION) (HCC): ICD-10-CM

## 2023-12-05 RX ORDER — AMLODIPINE BESYLATE 5 MG/1
5 TABLET ORAL DAILY
Qty: 90 TABLET | Refills: 0 | Status: SHIPPED | OUTPATIENT
Start: 2023-12-05

## 2023-12-05 RX ORDER — METOPROLOL SUCCINATE 50 MG/1
50 TABLET, EXTENDED RELEASE ORAL DAILY
Qty: 90 TABLET | Refills: 0 | Status: SHIPPED | OUTPATIENT
Start: 2023-12-05

## 2023-12-12 ENCOUNTER — RA CDI HCC (OUTPATIENT)
Dept: OTHER | Facility: HOSPITAL | Age: 73
End: 2023-12-12

## 2023-12-12 NOTE — PROGRESS NOTES
[address] [facility] coding opportunities       Chart reviewed, no opportunity found: CHART REVIEWED, NO OPPORTUNITY FOUND        Patients Insurance     Medic[facility]

## 2023-12-13 ENCOUNTER — OFFICE VISIT (OUTPATIENT)
Dept: FAMILY MEDICINE CLINIC | Facility: CLINIC | Age: 73
End: 2023-12-13
Payer: COMMERCIAL

## 2023-12-13 VITALS
RESPIRATION RATE: 18 BRPM | DIASTOLIC BLOOD PRESSURE: 80 MMHG | HEART RATE: 78 BPM | SYSTOLIC BLOOD PRESSURE: 124 MMHG | HEIGHT: 66 IN | WEIGHT: 165.6 LBS | BODY MASS INDEX: 26.61 KG/M2 | OXYGEN SATURATION: 98 % | TEMPERATURE: 98 F

## 2023-12-13 DIAGNOSIS — I10 ESSENTIAL HYPERTENSION: ICD-10-CM

## 2023-12-13 DIAGNOSIS — I48.0 PAF (PAROXYSMAL ATRIAL FIBRILLATION) (HCC): ICD-10-CM

## 2023-12-13 DIAGNOSIS — H11.32 CONJUNCTIVAL HEMORRHAGE OF LEFT EYE: Primary | ICD-10-CM

## 2023-12-13 PROCEDURE — 99214 OFFICE O/P EST MOD 30 MIN: CPT | Performed by: FAMILY MEDICINE

## 2023-12-13 NOTE — PROGRESS NOTES
Assessment/Plan:    No problem-specific Assessment & Plan notes found for this encounter. Left conjunctival hemorrhage  Warm compresses  No sign of infection  F/u prn    Htn stable  Continue meds    PAF stable  Not on any blood thinners     Diagnoses and all orders for this visit:    Conjunctival hemorrhage of left eye    Essential hypertension        No follow-ups on file. Subjective:      Patient ID: Darwin Pardo is a 68 y.o. male. Chief Complaint   Patient presents with    left eye bloodshot     X 2 days, L. Gomez/LPN    Headache       HPI  Started 2d ago  No injury  Not itchy  No dc  Maybe cough related? No blurry vision  No fever  No headache    The following portions of the patient's history were reviewed and updated as appropriate: allergies, current medications, past family history, past medical history, past social history, past surgical history and problem list.    Review of Systems   Constitutional:  Negative for chills and fever. Eyes:  Negative for visual disturbance. Current Outpatient Medications   Medication Sig Dispense Refill    amLODIPine (NORVASC) 5 mg tablet Take 1 tablet (5 mg total) by mouth daily 90 tablet 0    Calcium Citrate-Vitamin D 250 mg-2.5 mcg tablet Take 1 tablet by mouth 2 (two) times a day      ergocalciferol (VITAMIN D2) 50,000 units Take 1 capsule (50,000 Units total) by mouth once a week 4 capsule 0    metoprolol succinate (TOPROL-XL) 50 mg 24 hr tablet Take 1 tablet (50 mg total) by mouth daily 90 tablet 0     No current facility-administered medications for this visit. Objective:    /80   Pulse 78   Temp 98 °F (36.7 °C) (Temporal)   Resp 18   Ht 5' 6" (1.676 m)   Wt 75.1 kg (165 lb 9.6 oz)   SpO2 98%   BMI 26.73 kg/m²        Physical Exam  Vitals and nursing note reviewed. Constitutional:       General: He is not in acute distress. Appearance: He is well-developed. He is not ill-appearing. HENT:      Head: Normocephalic. Right Ear: Tympanic membrane normal.      Left Ear: Tympanic membrane normal.      Nose: No congestion. Eyes:      General: No scleral icterus. Right eye: No discharge. Left eye: No discharge. Conjunctiva/sclera:      Right eye: Right conjunctiva is not injected. No chemosis, exudate or hemorrhage. Left eye: Left conjunctiva is not injected. Hemorrhage present. No chemosis or exudate. Cardiovascular:      Rate and Rhythm: Normal rate. Pulmonary:      Effort: Pulmonary effort is normal. No respiratory distress. Abdominal:      Palpations: Abdomen is soft. Musculoskeletal:         General: No deformity. Cervical back: Neck supple. Skin:     General: Skin is warm and dry. Neurological:      Mental Status: He is alert. Psychiatric:         Behavior: Behavior normal.         Thought Content:  Thought content normal.                Yashira Soto DO

## 2023-12-13 NOTE — PATIENT INSTRUCTIONS
There is blood on the surface of the eye but not inside the eye. You do not need any eye drops like visine or covering. There is no infection but it would help if you used warm compresses on the left eye 3-4 times a day to make it get better sooner. You can expect it to change colors like yellow/orange color when it dissolves like a bruise. This can happen all on its own but sometimes with forceful cough or sneeze or eye injury or even using too much medications that can make your blood thinner, like ibuprofen or naproxen or aspirin. Tylenol is ok. This may take 1-2 weeks for it to go away and for you to be beautiful again.

## 2023-12-14 PROBLEM — M25.511 ACUTE PAIN OF RIGHT SHOULDER: Status: RESOLVED | Noted: 2023-06-02 | Resolved: 2023-12-14

## 2023-12-29 PROBLEM — Z12.5 PROSTATE CANCER SCREENING: Status: RESOLVED | Noted: 2023-10-30 | Resolved: 2023-12-29

## 2023-12-29 PROBLEM — Z00.00 MEDICARE ANNUAL WELLNESS VISIT, SUBSEQUENT: Status: RESOLVED | Noted: 2023-10-30 | Resolved: 2023-12-29

## 2024-01-10 ENCOUNTER — HOSPITAL ENCOUNTER (OUTPATIENT)
Dept: RADIOLOGY | Facility: HOSPITAL | Age: 74
Discharge: HOME | End: 2024-01-10
Payer: COMMERCIAL

## 2024-01-10 DIAGNOSIS — E04.2 MULTIPLE THYROID NODULES: ICD-10-CM

## 2024-01-10 PROCEDURE — 76536 US EXAM OF HEAD AND NECK: CPT

## 2024-01-17 ENCOUNTER — TELEPHONE (OUTPATIENT)
Dept: OTOLARYNGOLOGY | Facility: CLINIC | Age: 74
End: 2024-01-17

## 2024-01-17 NOTE — TELEPHONE ENCOUNTER
Clarice from radiology called to have Lola Hamilton read report, she stated there is significant findings.  I let her know that Lola will not be in office until Tuesday.

## 2024-01-30 DIAGNOSIS — I48.0 PAF (PAROXYSMAL ATRIAL FIBRILLATION) (HCC): ICD-10-CM

## 2024-01-30 DIAGNOSIS — I10 ESSENTIAL HYPERTENSION: ICD-10-CM

## 2024-01-30 RX ORDER — AMLODIPINE BESYLATE 5 MG/1
5 TABLET ORAL DAILY
Qty: 90 TABLET | Refills: 0 | Status: SHIPPED | OUTPATIENT
Start: 2024-01-30

## 2024-01-30 RX ORDER — METOPROLOL SUCCINATE 50 MG/1
50 TABLET, EXTENDED RELEASE ORAL DAILY
Qty: 90 TABLET | Refills: 0 | Status: SHIPPED | OUTPATIENT
Start: 2024-01-30

## 2024-02-06 ENCOUNTER — OFFICE VISIT (OUTPATIENT)
Dept: OTOLARYNGOLOGY | Facility: CLINIC | Age: 74
End: 2024-02-06
Payer: COMMERCIAL

## 2024-02-06 VITALS — TEMPERATURE: 97.9 F | BODY MASS INDEX: 26.52 KG/M2 | WEIGHT: 165 LBS | HEIGHT: 66 IN

## 2024-02-06 DIAGNOSIS — H61.23 BILATERAL IMPACTED CERUMEN: ICD-10-CM

## 2024-02-06 DIAGNOSIS — R49.0 HOARSENESS OF VOICE: ICD-10-CM

## 2024-02-06 DIAGNOSIS — E04.2 MULTIPLE THYROID NODULES: Primary | ICD-10-CM

## 2024-02-06 PROCEDURE — 99214 OFFICE O/P EST MOD 30 MIN: CPT | Performed by: NURSE PRACTITIONER

## 2024-02-06 PROCEDURE — 69210 REMOVE IMPACTED EAR WAX UNI: CPT | Performed by: NURSE PRACTITIONER

## 2024-02-06 RX ORDER — FAMOTIDINE 40 MG/1
40 TABLET, FILM COATED ORAL
Qty: 30 TABLET | Refills: 4 | Status: SHIPPED | OUTPATIENT
Start: 2024-02-06

## 2024-02-06 NOTE — PATIENT INSTRUCTIONS
Wax care at home - avoidance of q-tips, may use cerumen softeners every one to two months.  Hydrocortisone cream pea sized amount on finger as needed for itching in ears.

## 2024-02-06 NOTE — ASSESSMENT & PLAN NOTE
Use of  via phone for in depth discussion     TSh level 0.886 - 12/2022  Thyroid US 01/2024 indicating   Right lobe: 4.4 x 2.5 x 2.1 cm. Volume 10.8 mL  Left lobe:  5.2 x 1.6 x 1.7 cm. Volume 6.8 mL  Isthmus: 0.6  cm.  RIGHT midlobe nodule measuring 1.3 x 1.5 x 1.2 cm.  This nodule has decreased in size from prior (it measured 1.7 x 1.6 x 1.5 cm on 9/20/2021 and 1.6 x 1.4 x 1.6 cm on 1/23/2023). TR 4 (4-6 points),  FNA if > 1.5 cm. Follow if > 1cm.  LEFT lower pole nodule measuring 1.1 x 0.7 x 1.1 cm. Given differences in measuring technique, no significant change from prior. TR 3 (3 points),  FNA if >2.5 cm.  Follow if >1.5 cm.     FNAB 03/2022 of the Right nodule indicating Jackson III, Afirma Benign     Discussed Jackson rating system, EZRA guidelines, and indications for further interventions.  Reviewed options for treatment including repeat ultrasound in one year, repeat F[facility]B of the nodule with [facility].  Surgery not currently indicated.  Based on guidelines for EZRA, thyroid us in year or two years is reasonable due to FNA afirma Benign.       After discussion agreed to repeat thyroid US in one year  Follow up after testing January 2025    Bilateral impacted cerumen     On exam noted bilateral cerumen impaction and unable to fully view tympanic membrane.  Cerumen impaction removed bilateral eac with #5 suction, pt tolerated procedure well.  Upon removal, improved hearing and decreased clogged sensation of bilateral ears.    Encourage ongoing follow up annually to monitor for cerumen and hearing.     Neck and head pain may consider PT    Discussed nature of chronic cough and may be multi-factorial.  Discussed post nasal drip, reflux, vs other processes that may impact cough.  Reviewed options including voice rest, reflux medication therapy.  Reviewed action of reflux therapy medications.   After discussion agreed to H2 blockers.    Follow up annually to monitor pt progression of concerns

## 2024-02-06 NOTE — PROGRESS NOTES
Assessment/Plan:    Multiple thyroid nodules  Use of  via phone for in depth discussion     TSh level 0.886 - 12/2022  Thyroid US 01/2024 indicating   Right lobe: 4.4 x 2.5 x 2.1 cm. Volume 10.8 mL  Left lobe:  5.2 x 1.6 x 1.7 cm. Volume 6.8 mL  Isthmus: 0.6  cm.  RIGHT midlobe nodule measuring 1.3 x 1.5 x 1.2 cm.  This nodule has decreased in size from prior (it measured 1.7 x 1.6 x 1.5 cm on 9/20/2021 and 1.6 x 1.4 x 1.6 cm on 1/23/2023). TR 4 (4-6 points),  FNA if > 1.5 cm. Follow if > 1cm.  LEFT lower pole nodule measuring 1.1 x 0.7 x 1.1 cm. Given differences in measuring technique, no significant change from prior. TR 3 (3 points),  FNA if >2.5 cm.  Follow if >1.5 cm.     FNAB 03/2022 of the Right nodule indicating Lachine III, Afirma Benign     Discussed Lachine rating system, EZRA guidelines, and indications for further interventions.  Reviewed options for treatment including repeat ultrasound in one year, repeat FNAB of the nodule with [facility].  Surgery not currently indicated.  Based on guidelines for EZRA, thyroid us in year or two years is reasonable due to FNA afirma Benign.       After discussion agreed to repeat thyroid US in one year  Follow up after testing January 2025    Bilateral impacted cerumen     On exam noted bilateral cerumen impaction and unable to fully view tympanic membrane.  Cerumen impaction removed bilateral eac with #5 suction, pt tolerated procedure well.  Upon removal, improved hearing and decreased clogged sensation of bilateral ears.    Encourage ongoing follow up annually to monitor for cerumen and hearing.     Neck and head pain may consider PT    Discussed nature of chronic cough and may be multi-factorial.  Discussed post nasal drip, reflux, vs other processes that may impact cough.  Reviewed options including voice rest, reflux medication therapy.  Reviewed action of reflux therapy medications.   After discussion agreed to H2 blockers.    Follow up annually to  "monitor pt progression of concerns               Diagnoses and all orders for this visit:    Multiple thyroid nodules  -     US thyroid; Future  -     TSH, 3rd generation with Free T4 reflex; Future  -     T4; Future    Bilateral impacted cerumen  -     Ear cerumen removal    Hoarseness of voice  -     famotidine (PEPCID) 40 MG tablet; Take 1 tablet (40 mg total) by mouth daily at bedtime          Subjective:      Patient ID: Regino Huffman is a 73 y.o. male.    Presents today for follow up due to thyroid nodules and blocked ears.  Recent thyroid us and here to review results.  Occasional pain in head at night. Occasional neck and shoulder pain. Additional concerns include throat clearing and hoarseness of voice.      Use of  via phone for in depth discussion          The following portions of the patient's history were reviewed and updated as appropriate: allergies, current medications, past family history, past medical history, past social history, past surgical history, and problem list.    Review of Systems   Constitutional: Negative.    HENT:  Positive for hearing loss and voice change. Negative for congestion, ear discharge, ear pain, nosebleeds, postnasal drip, rhinorrhea, sinus pressure, sinus pain, sore throat and tinnitus.    Respiratory:  Negative for chest tightness and shortness of breath.    Skin:  Negative for color change.   Neurological:  Negative for dizziness, numbness and headaches.   Psychiatric/Behavioral: Negative.           Objective:      Temp 97.9 °F (36.6 °C) (Temporal)   Ht 5' 6\" (1.676 m)   Wt 74.8 kg (165 lb)   BMI 26.63 kg/m²          Physical Exam  Constitutional:       Appearance: He is well-developed.   HENT:      Head: Normocephalic.      Right Ear: Hearing, tympanic membrane, ear canal and external ear normal. No decreased hearing noted. No drainage or tenderness. There is impacted cerumen. Tympanic membrane is not perforated or erythematous.      Left Ear: " Hearing, tympanic membrane, ear canal and external ear normal. No decreased hearing noted. No drainage or tenderness. There is impacted cerumen. Tympanic membrane is not perforated or erythematous.      Nose: Nose normal. No nasal deformity or septal deviation.      Mouth/Throat:      Mouth: Mucous membranes are not pale and not dry. No oral lesions.      Dentition: Normal dentition.      Pharynx: Uvula midline. No oropharyngeal exudate.   Neck:      Trachea: No tracheal deviation.   Pulmonary:      Effort: No accessory muscle usage or respiratory distress.   Musculoskeletal:      Cervical back: Neck supple.   Lymphadenopathy:      Cervical: No cervical adenopathy.   Skin:     General: Skin is warm and dry.   Neurological:      Mental Status: He is alert and oriented to person, place, and time.      Cranial Nerves: No cranial nerve deficit.      Sensory: No sensory deficit.   Psychiatric:         Behavior: Behavior is cooperative.         Ear cerumen removal    Date/Time: 2/6/2024 9:00 AM    Performed by: SARA Olvera  Authorized by: SARA Olvera  Universal Protocol:  Consent: Verbal consent obtained.  Risks and benefits: risks, benefits and alternatives were discussed  Consent given by: patient  Patient understanding: patient states understanding of the procedure being performed    Patient location:  Clinic  Procedure details:     Local anesthetic:  None    Location:  L ear and R ear    Approach:  External  Post-procedure details:     Complication:  None    Hearing quality:  Normal    Patient tolerance of procedure:  Tolerated well, no immediate complications

## 2024-02-21 PROBLEM — H61.23 BILATERAL IMPACTED CERUMEN: Status: RESOLVED | Noted: 2021-07-21 | Resolved: 2024-02-21

## 2024-03-25 DIAGNOSIS — I48.0 PAF (PAROXYSMAL ATRIAL FIBRILLATION) (HCC): ICD-10-CM

## 2024-03-25 DIAGNOSIS — I10 ESSENTIAL HYPERTENSION: ICD-10-CM

## 2024-03-25 RX ORDER — AMLODIPINE BESYLATE 5 MG/1
5 TABLET ORAL DAILY
Qty: 90 TABLET | Refills: 0 | Status: SHIPPED | OUTPATIENT
Start: 2024-03-25

## 2024-03-25 RX ORDER — METOPROLOL SUCCINATE 50 MG/1
50 TABLET, EXTENDED RELEASE ORAL DAILY
Qty: 90 TABLET | Refills: 0 | Status: SHIPPED | OUTPATIENT
Start: 2024-03-25

## 2024-06-26 ENCOUNTER — APPOINTMENT (OUTPATIENT)
Dept: LAB | Facility: HOSPITAL | Age: 74
End: 2024-06-26
Payer: COMMERCIAL

## 2024-06-26 DIAGNOSIS — Z13.83 SCREENING FOR CARDIOVASCULAR, RESPIRATORY, AND GENITOURINARY DISEASES: ICD-10-CM

## 2024-06-26 DIAGNOSIS — Z13.1 SCREENING FOR DIABETES MELLITUS (DM): ICD-10-CM

## 2024-06-26 DIAGNOSIS — Z12.5 PROSTATE CANCER SCREENING: ICD-10-CM

## 2024-06-26 DIAGNOSIS — Z13.6 SCREENING FOR CARDIOVASCULAR, RESPIRATORY, AND GENITOURINARY DISEASES: ICD-10-CM

## 2024-06-26 DIAGNOSIS — E55.9 VITAMIN D DEFICIENCY: ICD-10-CM

## 2024-06-26 DIAGNOSIS — E04.2 MULTIPLE THYROID NODULES: ICD-10-CM

## 2024-06-26 DIAGNOSIS — Z13.89 SCREENING FOR CARDIOVASCULAR, RESPIRATORY, AND GENITOURINARY DISEASES: ICD-10-CM

## 2024-06-26 DIAGNOSIS — Z13.0 SCREENING FOR DEFICIENCY ANEMIA: ICD-10-CM

## 2024-06-26 LAB
25(OH)D3 SERPL-MCNC: 27.6 NG/ML (ref 30–100)
ALBUMIN SERPL BCG-MCNC: 4.1 G/DL (ref 3.5–5)
ALP SERPL-CCNC: 47 U/L (ref 34–104)
ALT SERPL W P-5'-P-CCNC: 21 U/L (ref 7–52)
ANION GAP SERPL CALCULATED.3IONS-SCNC: 7 MMOL/L (ref 4–13)
AST SERPL W P-5'-P-CCNC: 15 U/L (ref 13–39)
BASOPHILS # BLD AUTO: 0.04 THOUSANDS/ÂΜL (ref 0–0.1)
BASOPHILS NFR BLD AUTO: 0 % (ref 0–1)
BILIRUB SERPL-MCNC: 1.07 MG/DL (ref 0.2–1)
BUN SERPL-MCNC: 23 MG/DL (ref 5–25)
CALCIUM SERPL-MCNC: 9.2 MG/DL (ref 8.4–10.2)
CHLORIDE SERPL-SCNC: 109 MMOL/L (ref 96–108)
CHOLEST SERPL-MCNC: 233 MG/DL
CO2 SERPL-SCNC: 26 MMOL/L (ref 21–32)
CREAT SERPL-MCNC: 1.01 MG/DL (ref 0.6–1.3)
EOSINOPHIL # BLD AUTO: 0.08 THOUSAND/ÂΜL (ref 0–0.61)
EOSINOPHIL NFR BLD AUTO: 1 % (ref 0–6)
ERYTHROCYTE [DISTWIDTH] IN BLOOD BY AUTOMATED COUNT: 13.7 % (ref 11.6–15.1)
GFR SERPL CREATININE-BSD FRML MDRD: 72 ML/MIN/1.73SQ M
GLUCOSE P FAST SERPL-MCNC: 103 MG/DL (ref 65–99)
HCT VFR BLD AUTO: 50.5 % (ref 36.5–49.3)
HDLC SERPL-MCNC: 74 MG/DL
HGB BLD-MCNC: 16.9 G/DL (ref 12–17)
IMM GRANULOCYTES # BLD AUTO: 0.05 THOUSAND/UL (ref 0–0.2)
IMM GRANULOCYTES NFR BLD AUTO: 1 % (ref 0–2)
LDLC SERPL CALC-MCNC: 121 MG/DL (ref 0–100)
LYMPHOCYTES # BLD AUTO: 2.04 THOUSANDS/ÂΜL (ref 0.6–4.47)
LYMPHOCYTES NFR BLD AUTO: 21 % (ref 14–44)
MCH RBC QN AUTO: 32.7 PG (ref 26.8–34.3)
MCHC RBC AUTO-ENTMCNC: 33.5 G/DL (ref 31.4–37.4)
MCV RBC AUTO: 98 FL (ref 82–98)
MONOCYTES # BLD AUTO: 0.67 THOUSAND/ÂΜL (ref 0.17–1.22)
MONOCYTES NFR BLD AUTO: 7 % (ref 4–12)
NEUTROPHILS # BLD AUTO: 6.71 THOUSANDS/ÂΜL (ref 1.85–7.62)
NEUTS SEG NFR BLD AUTO: 70 % (ref 43–75)
NRBC BLD AUTO-RTO: 0 /100 WBCS
PLATELET # BLD AUTO: 214 THOUSANDS/UL (ref 149–390)
PMV BLD AUTO: 9 FL (ref 8.9–12.7)
POTASSIUM SERPL-SCNC: 3.3 MMOL/L (ref 3.5–5.3)
PROT SERPL-MCNC: 6.6 G/DL (ref 6.4–8.4)
PSA SERPL-MCNC: 1.98 NG/ML (ref 0–4)
RBC # BLD AUTO: 5.17 MILLION/UL (ref 3.88–5.62)
SODIUM SERPL-SCNC: 142 MMOL/L (ref 135–147)
T4 SERPL-MCNC: 7.72 UG/DL (ref 6.09–12.23)
TRIGL SERPL-MCNC: 192 MG/DL
TSH SERPL DL<=0.05 MIU/L-ACNC: 0.61 UIU/ML (ref 0.45–4.5)
WBC # BLD AUTO: 9.59 THOUSAND/UL (ref 4.31–10.16)

## 2024-06-26 PROCEDURE — 84443 ASSAY THYROID STIM HORMONE: CPT

## 2024-06-26 PROCEDURE — 80053 COMPREHEN METABOLIC PANEL: CPT

## 2024-06-26 PROCEDURE — 85025 COMPLETE CBC W/AUTO DIFF WBC: CPT

## 2024-06-26 PROCEDURE — 82306 VITAMIN D 25 HYDROXY: CPT

## 2024-06-26 PROCEDURE — 36415 COLL VENOUS BLD VENIPUNCTURE: CPT

## 2024-06-26 PROCEDURE — G0103 PSA SCREENING: HCPCS

## 2024-06-26 PROCEDURE — 80061 LIPID PANEL: CPT

## 2024-06-26 PROCEDURE — 84436 ASSAY OF TOTAL THYROXINE: CPT

## 2024-06-27 ENCOUNTER — RA CDI HCC (OUTPATIENT)
Dept: OTHER | Facility: HOSPITAL | Age: 74
End: 2024-06-27

## 2024-07-08 ENCOUNTER — OFFICE VISIT (OUTPATIENT)
Dept: FAMILY MEDICINE CLINIC | Facility: CLINIC | Age: 74
End: 2024-07-08
Payer: COMMERCIAL

## 2024-07-08 VITALS
SYSTOLIC BLOOD PRESSURE: 136 MMHG | DIASTOLIC BLOOD PRESSURE: 74 MMHG | RESPIRATION RATE: 20 BRPM | WEIGHT: 160 LBS | HEIGHT: 66 IN | HEART RATE: 84 BPM | TEMPERATURE: 96.1 F | BODY MASS INDEX: 25.71 KG/M2

## 2024-07-08 DIAGNOSIS — M19.011 BILATERAL SHOULDER REGION ARTHRITIS: Primary | ICD-10-CM

## 2024-07-08 DIAGNOSIS — M19.012 BILATERAL SHOULDER REGION ARTHRITIS: Primary | ICD-10-CM

## 2024-07-08 DIAGNOSIS — I10 ESSENTIAL HYPERTENSION: ICD-10-CM

## 2024-07-08 DIAGNOSIS — I48.0 PAF (PAROXYSMAL ATRIAL FIBRILLATION) (HCC): ICD-10-CM

## 2024-07-08 PROBLEM — H11.32 CONJUNCTIVAL HEMORRHAGE OF LEFT EYE: Status: RESOLVED | Noted: 2023-12-13 | Resolved: 2024-07-08

## 2024-07-08 PROCEDURE — 99214 OFFICE O/P EST MOD 30 MIN: CPT | Performed by: FAMILY MEDICINE

## 2024-07-08 PROCEDURE — G2211 COMPLEX E/M VISIT ADD ON: HCPCS | Performed by: FAMILY MEDICINE

## 2024-07-08 RX ORDER — MELOXICAM 15 MG/1
15 TABLET ORAL DAILY PRN
Qty: 90 TABLET | Refills: 1 | Status: SHIPPED | OUTPATIENT
Start: 2024-07-08 | End: 2025-01-04

## 2024-07-08 NOTE — PROGRESS NOTES
Assessment/Plan:    No problem-specific Assessment & Plan notes found for this encounter.    Arthritis pain  Med shoulders, OA seen on prior xrays  Nsaid risks advised  Discussed trial of mobic or duloxetine  He can call if decides to try duloxetine in future    Htn stable    PAF stable    We can offer you some safer daily options for arthritis pain,especially of your shoulders.    Ibuprofen and naproxen are not good options to use because frequent use can cause bleeding, ulcers, and kidney problems.    Safer options are the following:    MELOXICAM  (also known as Mobic) 15mg once a day is a pill for arthritis and inflammation.  It is lower risk than ibuprofen and naproxen.  If you take it every day and it seems to work, then you could later on just use it as needed, not necessarily every day.  15mg is the maximum dose but some patients do ok on the lower dose of 7.5mg  per day.  If you decide to start with this, then I suggest you take it every day for at least 10 days to see if it does anything at all, because if it does not, then we should not bother with it.    DULOXETINE (also known as Cymbalta) 30mg is a once a day pill that also works on depression, but is safer since there is no bleeding risk but you have to commit to using it every day, since it is not effective on an as needed basis. If 30mg does not help after 2 weeks, we can go higher if needed, to 60mg.    Another option that is less convenient is Tylenol (acetaminophen) and you can safely take up to 4000mg per day as the maximum dose or 1000mg three times a day and still be at a safe amount.    If you take the meloxicam or the duloxetine, you can still take tylenol but not advil or motrin or aleve.  You can also still use topical products for pain.     Diagnoses and all orders for this visit:    Bilateral shoulder region arthritis  -     meloxicam (MOBIC) 15 mg tablet; Take 1 tablet (15 mg total) by mouth daily as needed for moderate pain    Essential  "hypertension    PAF (paroxysmal atrial fibrillation) (HCC)              Return if symptoms worsen or fail to improve.    Subjective:      Patient ID: Regino Huffman is a 74 y.o. male.    Chief Complaint   Patient presents with    Shoulder Pain     Discuss pain in both shoulders- started \"a long time ago\" [id number]       HPI  B/l shoulder pain  Daily  Affects sleep  Tried topical otc  Advil helps  Affects all activities and QOL  Known OA    The following portions of the patient's history were reviewed and updated as appropriate: allergies, current medications, past family history, past medical history, past social history, past surgical history and problem list.    Review of Systems   Constitutional:  Negative for chills and fever.   Musculoskeletal:  Positive for arthralgias.         Current Outpatient Medications   Medication Sig Dispense Refill    amLODIPine (NORVASC) 5 mg tablet Take 1 tablet (5 mg total) by mouth daily 90 tablet 0    Calcium Citrate-Vitamin D 250 mg-2.5 mcg tablet Take 1 tablet by mouth 2 (two) times a day      ergocalciferol (VITAMIN D2) 50,000 units Take 1 capsule (50,000 Units total) by mouth once a week 4 capsule 0    famotidine (PEPCID) 40 MG tablet Take 1 tablet (40 mg total) by mouth daily at bedtime 30 tablet 4    meloxicam (MOBIC) 15 mg tablet Take 1 tablet (15 mg total) by mouth daily as needed for moderate pain 90 tablet 1    metoprolol succinate (TOPROL-XL) 50 mg 24 hr tablet Take 1 tablet (50 mg total) by mouth daily 90 tablet 0     No current facility-administered medications for this visit.       Objective:    /74   Pulse 84   Temp (!) 96.1 °F (35.6 °C)   Resp 20   Ht 5' 6\" (1.676 m)   Wt 72.6 kg (160 lb)   BMI 25.82 kg/m²        Physical Exam  Vitals and nursing note reviewed.   Constitutional:       General: He is not in acute distress.     Appearance: He is well-developed. He is not ill-appearing.   HENT:      Head: Normocephalic.      Right Ear: Tympanic membrane " normal.      Left Ear: Tympanic membrane normal.   Eyes:      General: No scleral icterus.     Conjunctiva/sclera: Conjunctivae normal.   Cardiovascular:      Rate and Rhythm: Normal rate and regular rhythm.      Heart sounds: No murmur heard.  Pulmonary:      Effort: Pulmonary effort is normal. No respiratory distress.      Breath sounds: No wheezing.   Abdominal:      Palpations: Abdomen is soft.      Tenderness: There is no abdominal tenderness.   Musculoskeletal:         General: Tenderness present. No swelling or deformity.      Cervical back: Neck supple.      Right lower leg: No edema.      Left lower leg: No edema.      Comments: Figuerao neg b/l   Skin:     General: Skin is warm and dry.      Coloration: Skin is not jaundiced or pale.   Neurological:      Mental Status: He is alert.      Gait: Gait normal.   Psychiatric:         Mood and Affect: Mood normal.         Behavior: Behavior normal.         Thought Content: Thought content normal.                Blanco Rojo DO

## 2024-07-08 NOTE — PATIENT INSTRUCTIONS
We can offer you some safer daily options for arthritis pain,especially of your shoulders.    Ibuprofen and naproxen are not good options to use because frequent use can cause bleeding, ulcers, and kidney problems.    Safer options are the following:    MELOXICAM  (also known as Mobic) 15mg once a day is a pill for arthritis and inflammation.  It is lower risk than ibuprofen and naproxen.  If you take it every day and it seems to work, then you could later on just use it as needed, not necessarily every day.  15mg is the maximum dose but some patients do ok on the lower dose of 7.5mg  per day.  If you decide to start with this, then I suggest you take it every day for at least 10 days to see if it does anything at all, because if it does not, then we should not bother with it.    DULOXETINE (also known as Cymbalta) 30mg is a once a day pill that also works on depression, but is safer since there is no bleeding risk but you have to commit to using it every day, since it is not effective on an as needed basis. If 30mg does not help after 2 weeks, we can go higher if needed, to 60mg.    Another option that is less convenient is Tylenol (acetaminophen) and you can safely take up to 4000mg per day as the maximum dose or 1000mg three times a day and still be at a safe amount.    If you take the meloxicam or the duloxetine, you can still take tylenol but not advil or motrin or aleve.  You can also still use topical products for pain.

## 2024-07-21 DIAGNOSIS — I48.0 PAF (PAROXYSMAL ATRIAL FIBRILLATION) (HCC): ICD-10-CM

## 2024-07-21 DIAGNOSIS — I10 ESSENTIAL HYPERTENSION: ICD-10-CM

## 2024-07-21 RX ORDER — METOPROLOL SUCCINATE 50 MG/1
50 TABLET, EXTENDED RELEASE ORAL DAILY
Qty: 90 TABLET | Refills: 0 | Status: SHIPPED | OUTPATIENT
Start: 2024-07-21

## 2024-07-21 RX ORDER — AMLODIPINE BESYLATE 5 MG/1
5 TABLET ORAL DAILY
Qty: 90 TABLET | Refills: 0 | Status: SHIPPED | OUTPATIENT
Start: 2024-07-21

## 2024-09-23 ENCOUNTER — OFFICE VISIT (OUTPATIENT)
Dept: FAMILY MEDICINE CLINIC | Facility: CLINIC | Age: 74
End: 2024-09-23
Payer: COMMERCIAL

## 2024-09-23 VITALS
WEIGHT: 159.4 LBS | HEART RATE: 66 BPM | BODY MASS INDEX: 25.62 KG/M2 | RESPIRATION RATE: 18 BRPM | SYSTOLIC BLOOD PRESSURE: 136 MMHG | HEIGHT: 66 IN | TEMPERATURE: 96.7 F | DIASTOLIC BLOOD PRESSURE: 88 MMHG

## 2024-09-23 DIAGNOSIS — Z13.1 SCREENING FOR DIABETES MELLITUS (DM): ICD-10-CM

## 2024-09-23 DIAGNOSIS — R26.9 GAIT DISORDER: ICD-10-CM

## 2024-09-23 DIAGNOSIS — R42 DIZZINESS: Primary | ICD-10-CM

## 2024-09-23 DIAGNOSIS — I10 ESSENTIAL HYPERTENSION: ICD-10-CM

## 2024-09-23 PROCEDURE — 99214 OFFICE O/P EST MOD 30 MIN: CPT | Performed by: FAMILY MEDICINE

## 2024-09-23 PROCEDURE — G2211 COMPLEX E/M VISIT ADD ON: HCPCS | Performed by: FAMILY MEDICINE

## 2024-09-23 NOTE — PATIENT INSTRUCTIONS
Your blood pressure is ok today.  Please contact your cardiologist Dr Hernandez if you need refills of the metoprolol and amlodipine.    Your neurologic exam seems normal but we can do a CAT scan of your brain and order physical therapy to evaluate the dizziness and weakness you have.    Blood tests were ordered for you to do soon.  It should be fasting.  The [facility] lab has the orders in their system so you don't the paper copy.

## 2024-09-23 NOTE — PROGRESS NOTES
Assessment/Plan:    No problem-specific Assessment & Plan notes found for this encounter.    Intermittent dizziness  Normal neuro exam  Check labs  PT eval for gait stability and HEP    Htn stable    Prediabetes  Watch carbs  Stay hydrated    Low K  recheck     Diagnoses and all orders for this visit:    Dizziness  -     Comprehensive metabolic panel; Future  -     CBC and differential; Future  -     Ambulatory referral to Physical Therapy; Future    Gait disorder  -     Ambulatory referral to Physical Therapy; Future    Essential hypertension  -     Comprehensive metabolic panel; Future    Screening for diabetes mellitus (DM)              No follow-ups on file.    Subjective:      Patient ID: Regino Huffman is a 74 y.o. male.    Chief Complaint   Patient presents with    Follow-up     Discuss medications Ting Smith LPN      Dizziness     While walking, has difficulty with stairs       HPI  Dizzy and trouble walking at times  Not sure if related to the meds, takes them in am  Feels weak in legs at times  2 falls per pt  No sob  No blood in stool or urine  No major weight loss  Prediabetes  Loves pasta  Slight low K aware    The following portions of the patient's history were reviewed and updated as appropriate: allergies, current medications, past family history, past medical history, past social history, past surgical history and problem list.    Review of Systems   Respiratory:  Negative for shortness of breath.    Cardiovascular:  Negative for chest pain and palpitations.   Neurological:  Negative for syncope.         Current Outpatient Medications   Medication Sig Dispense Refill    amLODIPine (NORVASC) 5 mg tablet Take 1 tablet (5 mg total) by mouth daily 90 tablet 0    Calcium Citrate-Vitamin D 250 mg-2.5 mcg tablet Take 1 tablet by mouth 2 (two) times a day      metoprolol succinate (TOPROL-XL) 50 mg 24 hr tablet Take 1 tablet (50 mg total) by mouth daily 90 tablet 0    meloxicam (MOBIC) 15 mg  "tablet Take 1 tablet (15 mg total) by mouth daily as needed for moderate pain (Patient not taking: Reported on 9/23/2024) 90 tablet 1     No current facility-administered medications for this visit.       Objective:    /88   Pulse 66   Temp (!) 96.7 °F (35.9 °C)   Resp 18   Ht 5' 6\" (1.676 m)   Wt 72.3 kg (159 lb 6.4 oz)   BMI 25.73 kg/m²        Physical Exam  Vitals and nursing note reviewed.   Constitutional:       General: He is not in acute distress.     Appearance: He is well-developed. He is not ill-appearing or diaphoretic.   HENT:      Head: Normocephalic.      Right Ear: Tympanic membrane normal.      Left Ear: Tympanic membrane normal.   Eyes:      General: No scleral icterus.     Conjunctiva/sclera: Conjunctivae normal.   Neck:      Vascular: No carotid bruit.   Cardiovascular:      Rate and Rhythm: Normal rate and regular rhythm.      Heart sounds: No murmur heard.  Pulmonary:      Effort: Pulmonary effort is normal. No respiratory distress.      Breath sounds: No wheezing.   Abdominal:      General: There is no distension.      Palpations: Abdomen is soft.      Tenderness: There is no abdominal tenderness.   Musculoskeletal:         General: No deformity.      Cervical back: Neck supple.      Right lower leg: No edema.      Left lower leg: No edema.   Skin:     General: Skin is warm and dry.      Coloration: Skin is not pale.   Neurological:      General: No focal deficit present.      Mental Status: He is alert.      Cranial Nerves: No cranial nerve deficit.      Coordination: Coordination normal.      Deep Tendon Reflexes: Reflexes normal.   Psychiatric:         Mood and Affect: Mood normal.         Behavior: Behavior normal.         Thought Content: Thought content normal.                Blanco Rojo DO    "

## 2024-09-24 ENCOUNTER — APPOINTMENT (OUTPATIENT)
Dept: LAB | Facility: HOSPITAL | Age: 74
End: 2024-09-24
Attending: FAMILY MEDICINE
Payer: COMMERCIAL

## 2024-09-24 DIAGNOSIS — I10 ESSENTIAL HYPERTENSION: ICD-10-CM

## 2024-09-24 DIAGNOSIS — R42 DIZZINESS: ICD-10-CM

## 2024-09-24 LAB
ALBUMIN SERPL BCG-MCNC: 4.3 G/DL (ref 3.5–5)
ALP SERPL-CCNC: 47 U/L (ref 34–104)
ALT SERPL W P-5'-P-CCNC: 25 U/L (ref 7–52)
ANION GAP SERPL CALCULATED.3IONS-SCNC: 9 MMOL/L (ref 4–13)
AST SERPL W P-5'-P-CCNC: 16 U/L (ref 13–39)
BASOPHILS # BLD AUTO: 0.05 THOUSANDS/ΜL (ref 0–0.1)
BASOPHILS NFR BLD AUTO: 1 % (ref 0–1)
BILIRUB SERPL-MCNC: 0.82 MG/DL (ref 0.2–1)
BUN SERPL-MCNC: 18 MG/DL (ref 5–25)
CALCIUM SERPL-MCNC: 9.1 MG/DL (ref 8.4–10.2)
CHLORIDE SERPL-SCNC: 107 MMOL/L (ref 96–108)
CO2 SERPL-SCNC: 28 MMOL/L (ref 21–32)
CREAT SERPL-MCNC: 1.04 MG/DL (ref 0.6–1.3)
EOSINOPHIL # BLD AUTO: 0.08 THOUSAND/ΜL (ref 0–0.61)
EOSINOPHIL NFR BLD AUTO: 1 % (ref 0–6)
ERYTHROCYTE [DISTWIDTH] IN BLOOD BY AUTOMATED COUNT: 13.7 % (ref 11.6–15.1)
GFR SERPL CREATININE-BSD FRML MDRD: 70 ML/MIN/1.73SQ M
GLUCOSE SERPL-MCNC: 105 MG/DL (ref 65–140)
HCT VFR BLD AUTO: 51.5 % (ref 36.5–49.3)
HGB BLD-MCNC: 16.4 G/DL (ref 12–17)
IMM GRANULOCYTES # BLD AUTO: 0.07 THOUSAND/UL (ref 0–0.2)
IMM GRANULOCYTES NFR BLD AUTO: 1 % (ref 0–2)
LYMPHOCYTES # BLD AUTO: 3.54 THOUSANDS/ΜL (ref 0.6–4.47)
LYMPHOCYTES NFR BLD AUTO: 32 % (ref 14–44)
MCH RBC QN AUTO: 31.6 PG (ref 26.8–34.3)
MCHC RBC AUTO-ENTMCNC: 31.8 G/DL (ref 31.4–37.4)
MCV RBC AUTO: 99 FL (ref 82–98)
MONOCYTES # BLD AUTO: 0.78 THOUSAND/ΜL (ref 0.17–1.22)
MONOCYTES NFR BLD AUTO: 7 % (ref 4–12)
NEUTROPHILS # BLD AUTO: 6.48 THOUSANDS/ΜL (ref 1.85–7.62)
NEUTS SEG NFR BLD AUTO: 58 % (ref 43–75)
NRBC BLD AUTO-RTO: 0 /100 WBCS
PLATELET # BLD AUTO: 233 THOUSANDS/UL (ref 149–390)
PMV BLD AUTO: 8.6 FL (ref 8.9–12.7)
POTASSIUM SERPL-SCNC: 3.6 MMOL/L (ref 3.5–5.3)
PROT SERPL-MCNC: 7.1 G/DL (ref 6.4–8.4)
RBC # BLD AUTO: 5.19 MILLION/UL (ref 3.88–5.62)
SODIUM SERPL-SCNC: 144 MMOL/L (ref 135–147)
WBC # BLD AUTO: 11 THOUSAND/UL (ref 4.31–10.16)

## 2024-09-24 PROCEDURE — 85025 COMPLETE CBC W/AUTO DIFF WBC: CPT

## 2024-09-24 PROCEDURE — 36415 COLL VENOUS BLD VENIPUNCTURE: CPT

## 2024-09-24 PROCEDURE — 80053 COMPREHEN METABOLIC PANEL: CPT

## 2024-10-01 ENCOUNTER — EVALUATION (OUTPATIENT)
Facility: CLINIC | Age: 74
End: 2024-10-01
Payer: COMMERCIAL

## 2024-10-01 DIAGNOSIS — R26.9 GAIT DISORDER: ICD-10-CM

## 2024-10-01 DIAGNOSIS — R42 DIZZINESS: ICD-10-CM

## 2024-10-01 DIAGNOSIS — R26.89 IMBALANCE: Primary | ICD-10-CM

## 2024-10-01 PROCEDURE — 97162 PT EVAL MOD COMPLEX 30 MIN: CPT

## 2024-10-01 NOTE — PROGRESS NOTES
PT Evaluation          POC expires Unit limit Auth Expiration date PT/OT + Visit Limit? Co-Insurance   24 Submitted to FD 10/1/24, AV pending pending No and $10 Co-pay                               Visit/Unit Tracking  AUTH Status:  Date 10/1              Submitted to FD 10/1/24, AV Used 1               Remaining                    PCP    Neurologist/Last Seen    ENT/Last Seen    Psych/Last Seen    OT    Imaging               Today's date: 10/1/2024  Patient name: Regino Huffman  : 1950  MRN: [id number]  Referring provider: Blanco Rojo DO  Dx:   Encounter Diagnosis     ICD-10-CM    1. Imbalance  R26.89       2. Dizziness  R42 Ambulatory referral to Physical Therapy      3. Gait disorder  R26.9 Ambulatory referral to Physical Therapy            Assessment  Assessment details: Patient is a 74 y.o. Male who presents to skilled outpatient PT with complaints of dizziness and activity intolerance which is impacting his balance confidence and ability to ambulate in his community. Patient past medical history significant for kidney stone removal and urinary incontinence. Cervical spine integrity intact per normal and negative results of mVBI, Sharp Loni, and Alar Stability Tests respectively. Coordination, sensation, and cervical active range of motion all intact and within functional limitations as per screening. During VOMs patient displayed increased difficulty with vertical saccadic movements and with smooth pursuits however, no reports of dizziness onset. He did relay dizziness with VORx1, VOR cancel, and Head Shake Test suggesting possible mild vestibular hypofunction. He had great difficulty performing gait activities with volitional head movements, when visual field was occluded, and with narrow Marva during FGA and DGI further suggesting limitations in vestibular system function as it pertains to balance. This is also  reflected in mCTSIB as patient was unable to complete last condition of test. Educated patient on inner ear system and how it can cause dizziness symptoms as well as imbalance; patient in good verbal understanding. Included lower extremity strength testing this date secondary to complaints of weakness and decreased tolerance to activity. He demonstrates deficits in proximal hip strength as per MMT and global limitations in lower extremity strength and power as per 5x STS. His gait speed of 1.0 m/s classifies him as a community ambulator however, not yet meeting the criteria for safely crossing the street. Patient classifies as HIGH risk for falls per APTA and Rehab Measures Lab cutoff values for FGA and DGI scores. Plan is to commence with vestibular based rehab and incorporate balance and aerobic exercises to promote return to recreational activities such as walking in the park. Patient is highly motivated to participate with skilled OPPT services and will benefit from continued care to target limitations, decreased risk for falls, and improve overall QoL.     Patient verbalized understanding of POC.    Please contact me if you have any questions or recommendations. Thank you for the referral and the opportunity to share in Regino Huffman's care.      Cut off score   All date taken from APTA Neuro Section or Rehab Measures    DGI:  MDC for Vestibular Disorders: 4 points  MDC for Geriatrics/Community Dwelling Older Adults: 3 Points  Falls risk cut off: <19/24    FGA:  MCID: 4 points  Geriatrics/Community Dwelling Older Adults: </= 22/30 fall risk  Geriatrics/Community Dwelling Older Adults: </= 20/30 unexplained falls in the next 6 months  Parkinsons: </= 18/30 fall risk    mCTSIB (normed on ages 20-60, lower number is less sway or better static balance)  Eyes open firm surface (norm 0.21-0.48)  Eyes closed firm surface (norm 0.48-0.99)  Eyes open foam surface (norm 0.38-0.71)  Eyes closed foam surface (norm  0.70-2.22)    DHI:  0-39: low perception of handicap  40-69: moderate perception of handicap  : severe perception of handicap  > 60: increased risk for falls    Joint Position Error Testing (JPET):  > 4.5 degrees: abnormal joint proprioception        Impairments: Abnormal coordination, abnormal gait, abnormal muscle tone, abnormal or restricted ROM, activity intolerance, impaired balance, impaired physical strength, lacks appropriate HEP, poor posture, poor body mechanics, pain with function, safety issue, abnormal movement  Understanding of Dx/Px/POC: good  Prognosis: good      Goals    Vestibular Short Term Goals (4 weeks):  - Patient will be independent with simple HEP  - Patient will tolerate 60 seconds of oculomotor exercises with minimal increase in symptoms  - Patient will demonstrate 10% decrease in symptom severity scoring with independent use of modalities  - Patient will be able to tolerate 30 seconds with eyes closed on foam surface without any loss of balance demonstrating improvement in vestibular system  - Patient will improve with DGI by 3 points per MDC to promote improved safety with dynamic tasks  - Patient will improve FGA score by 4 points per MDC to promote improved safety with dynamic tasks  - Patient will improve 5xSTS score by 2.3 seconds to promote improved LE functional strength needed for ADLs    Vestibular Long Term Goals (12 weeks):  - Patient will be independent with complex HEP  - Patient will tolerate >=2 minutes of oculomotor exercises to facilitate return to reading and computer work  - Patient will report >= 50% improvement on symptom severity scoring  - Patient will demonstrate ability to perform HT in gait without veering  - Patient will be able to perform 15 minutes of aerobic activity at HR 70% max to facilitate return to sport/normal functional tasks  - Patient will score low risk for falls with DGI test with score of 19/24 or higher per current research data  - Patient  will score low risk for falls with FGA test with score of 23/30 or higher per current research data  - Patient will report baseline dizziness of 1/10 or less   - Patient will report 2/10 dizziness or less with visual stimulating surround with duration of 2 minutes   - Patient will report subjective improvement to 90% or higher to promote return to PLOF      Plan  Plan details: vestibular rehab, functional strengthening, balance  Patient would benefit from: PT Eval and Skilled PT  Planned modality interventions: Biofeedback, Cryotherapy, TENS, Thermotherapy  Planned therapy interventions: Abdominal trunk stabilization, ADL training, balance, balance/WB training, breathing training, body mechanics training, coordination, flexibility, functional ROM exercises, gait training, HEP, manual therapy, Mckeon Taping, motor coordination training, neuromuscular re-education, patient education, postural training, strengthening, stretching, therapeutic activities, therapeutic exercises, work re-integration  Frequency: 2x/wk  Duration in weeks: 12 weeks  Plan of Care beginning date: 10/1/14  Plan of Care expiration date: 12/24/24  Treatment plan discussed with: patient        Subjective Evaluation    History of Present Illness  Mechanism of injury: Patient presents to PT stating he has been experiencing dizziness he describes as swaying which throws him off balance making it difficult for him to walk. He states he notices his dizziness most following medication consumption. He states he had a recent change to medication which has helped the dizziness however continues to feel imbalance. Patient also reports he has noticed decreased tolerance to activity as he is unable to walk long distances and his tolerance to exercise has decreased. He states his greatest goal is to improve overall tolerance to activity in order to return to walking in the park.     Patient goal: tolerate more activity       Dizziness Subjective  How long  "does dizziness last: hour  How would you describe the dizziness: swaying dizziness   Rolling in bed: No  Supine to/from sit: Yes  Recent hearing loss: No  Tinnitus: No  Aural fullness/ear pain: No  Vision changes: Yes  History of migraines: Yes    Red Flag Screen  - Numbness: No  - Tingling: No  - Weakness: Yes  - Unilateral hearing loss: No  - Slurred speech: No  - Progressive hearing loss: No  - Tremors: No    Pain  NA     Social Support  Steps to enter house: 6 DARIANA  Stairs in house: 1 flight up  Lives in: multi-level home   Lives with: daughter and her family     Employment status: retired  Hand dominance: RHD    Treatments  Previous treatment: none  Current treatment: medication  Diagnostic Testing: none      Objective     Vestibular Objective  Cervical Spine AROM:  - Flexion: WFL no pain  - Extension: WFL no pain  - R Rotation: WFL no pain  - L Rotation: WFL no pain  - R Lateral Flexion: WFL pain at end range  - L Lateral Flexion: WFL no pain    Integrity Testing  - mVBI: normal  - Sharp Loni: normal  - Alar Stability Test: normal    Coordination Screen  - Dysmetria: normal  - Dysdiadochokinesia: normal    Oculomotor Screen  - Baseline Symptoms: 0/10  - Smooth Pursuits (central): H: Normal Dizziness: 0/10, Observation: difficulty tracking  - Saccades (central): H: Normal and V: Abnormal Dizziness: 0/10, Observation: vertical slowed  - Near Point Convergence (normal: < 4\"/10 cm - central): H: Normal Dizziness: 0/10, Observation: able to converge  - VORx1: H: Abnormal Dizziness: H:6/10 V:0/10, Observation: able to maintain gaze  - VOR Cancel (central): H: Normal Dizziness: H: 2/10, V:2/10 Observation: difficulty coordinating  - Head Thrust (moderate to severe hypofunction): H: Normal Dizziness: 0/10, Observation: difficulty maintaining gaze  - Head Shaking Test (mild hypofunction): H: Abnormal Dizziness: 6/10, Observation: no visible nystagmus    LE MMT  - R Hip Flexion: 3+/5  L Hip Flexion: 3+/5  - R Hip " Extension: 4-/5  L Hip Extension: 4-/5  - R Hip Abduction: 4/5  L Hip Abduction: 4/5  - R Hip Adduction: 4-/5  L Hip Adduction: 4-/5  - R Knee Extension: 4-/5  L Knee Extension: 4-/5  - R Knee Flexion: 4/5  L Knee Flexion: 4/5  - R Ankle DF: 4-/5   L Ankle DF: 4-/5  - R Ankle PF: 4-/5   L Ankle PF: 4-/5      Outcome Measures Initial Eval  10/1/24   5xSTS 15.94 sec   10 meter 10.03 ft/sec  1.00 m/s   FGA 15/30   DGI 13/24   mCTSIB  - FTEO (firm)  - FTEC (firm)  - FTEO (foam)  - FTEC (foam)   30 sec  30 sec +  30 sec +  2 sec   6MWT defer           Precautions:   Past Medical History:   Diagnosis Date    Arthritis     shoulder    BPH (benign prostatic hyperplasia)     Disease of thyroid gland     nodules    Hypertension     Kidney calculi     left    Kidney stone     Nocturia     Urinary incontinence     During the night, urinary frequency during the day.    Wears glasses

## 2024-10-03 ENCOUNTER — OFFICE VISIT (OUTPATIENT)
Facility: CLINIC | Age: 74
End: 2024-10-03
Payer: COMMERCIAL

## 2024-10-03 DIAGNOSIS — R26.89 IMBALANCE: ICD-10-CM

## 2024-10-03 DIAGNOSIS — R42 DIZZINESS: Primary | ICD-10-CM

## 2024-10-03 DIAGNOSIS — R26.9 GAIT DISORDER: ICD-10-CM

## 2024-10-03 PROCEDURE — 97112 NEUROMUSCULAR REEDUCATION: CPT

## 2024-10-03 NOTE — PROGRESS NOTES
Daily Note     Today's date: 10/3/2024  Patient name: Regino Huffman  : 1950  MRN: [id number]  Referring provider: Blanco Rojo DO  Dx:   Encounter Diagnosis     ICD-10-CM    1. Dizziness  R42       2. Gait disorder  R26.9       3. Imbalance  R26.89         POC expires Unit limit Auth Expiration date PT/OT + Visit Limit? Co-Insurance   24 16 v 10/1-  No and $10 Co-pay                               Visit/Unit Tracking  AUTH Status:  Date 10/1 10/3             16 visits  Used 1   15 14                 PCP    Neurologist/Last Seen    ENT/Last Seen    Psych/Last Seen    OT    Imaging        Subjective: Patient states that he has not been feeling dizzy since evaluation.      Objective: See treatment diary below  NMR:   - Seated VOR x1    H: 0/10 DZ   V: 0/10 DZ  - Standing VOR x1: 3 sets   H: 0/10 DZ   V: 0/10 DZ   - Walking with H and V turns: 200 ft   Asymptomatic   - Fwd/bwd walking with 180 turns: 200ft    Asymptomatic   - Tossing ball to self H: 1 min    Asymptomatic   - Tossing ball to self V: 1 min    Asymptomatic    Assessment: Tolerated treatment well with a focus on vestibular adaptation exercises. Patient asymptomatic with all exercises today, suggesting improvement in vestibular hypofunction. Plan to add balance exercises for next session secondary to patient M/L instability with ambulation and whole body turns. Patient would benefit from continued PT      Plan: Continue per plan of care.        Outcome Measures Initial Eval  10/1/24   5xSTS 15.94 sec   10 meter 10.03 ft/sec  1.00 m/s   FGA 15/30   DGI    mCTSIB  - FTEO (firm)  - FTEC (firm)  - FTEO (foam)  - FTEC (foam)   30 sec  30 sec +  30 sec +  2 sec   6MWT defer

## 2024-10-04 DIAGNOSIS — I10 ESSENTIAL HYPERTENSION: ICD-10-CM

## 2024-10-04 DIAGNOSIS — I48.0 PAF (PAROXYSMAL ATRIAL FIBRILLATION) (HCC): ICD-10-CM

## 2024-10-07 RX ORDER — AMLODIPINE BESYLATE 5 MG/1
5 TABLET ORAL DAILY
Qty: 90 TABLET | Refills: 0 | Status: SHIPPED | OUTPATIENT
Start: 2024-10-07 | End: 2024-10-14

## 2024-10-07 RX ORDER — METOPROLOL SUCCINATE 50 MG/1
50 TABLET, EXTENDED RELEASE ORAL DAILY
Qty: 90 TABLET | Refills: 0 | Status: SHIPPED | OUTPATIENT
Start: 2024-10-07 | End: 2024-10-14

## 2024-10-08 ENCOUNTER — OFFICE VISIT (OUTPATIENT)
Facility: CLINIC | Age: 74
End: 2024-10-08
Payer: COMMERCIAL

## 2024-10-08 DIAGNOSIS — R26.89 IMBALANCE: ICD-10-CM

## 2024-10-08 DIAGNOSIS — R42 DIZZINESS: Primary | ICD-10-CM

## 2024-10-08 DIAGNOSIS — R26.9 GAIT DISORDER: ICD-10-CM

## 2024-10-08 PROCEDURE — 97112 NEUROMUSCULAR REEDUCATION: CPT

## 2024-10-08 NOTE — PROGRESS NOTES
Daily Note     Today's date: 10/8/2024  Patient name: Regino Huffman  : 1950  MRN: [id number]  Referring provider: Blanco Rojo DO  Dx:   Encounter Diagnosis     ICD-10-CM    1. Dizziness  R42       2. Gait disorder  R26.9       3. Imbalance  R26.89         POC expires Unit limit Auth Expiration date PT/OT + Visit Limit? Co-Insurance   24 16 v 10/1-  No and $10 Co-pay                               Visit/Unit Tracking  AUTH Status:  Date 10/1 10/3 10/8            16 visits  Used 1 1 1             Remaining  15 14 13                PCP    Neurologist/Last Seen    ENT/Last Seen    Psych/Last Seen    OT    Imaging        Subjective: Patient states that he has not been feeling dizzy since evaluation.      Objective: See treatment diary below  NMR:   - STS with feet on foam pad : 20 x  - Step ups onto small/medium + foam pad: 1 min   - Side stepping on foam beam: 5 laps   - Tandem walking on foam beam: 5 laps   - Lateral 6 in hurdles: 5 laps   - Fwd/bwd 6 in hurdles: 5 laps   - Bouncing tennis ball while walkin min     Assessment: Tolerated treatment well with a focus on balance exercises today. Patient most challenged with stepping on and off RR onto foam pad as noted by M/L instability and assistance from PT to remain balanced. He illustrated 1-2 LOB with side stepping/tandem ambulation on complaint surfaces which required use of stepping vs ankle strategy, likely due to decreased proprioceptive input. Required cueing to maintain neutral hips/shoulders with lateral stepping over hurdles with fair carryover. Patient would benefit from continued PT.       Plan: Continue per plan of care.        Outcome Measures Initial Eval  10/1/24   5xSTS 15.94 sec   10 meter 10.03 ft/sec  1.00 m/s   FGA 15/30   DGI    mCTSIB  - FTEO (firm)  - FTEC (firm)  - FTEO (foam)  - FTEC (foam)   30 sec  30 sec +  30 sec +  2 sec   6MWT defer

## 2024-10-10 ENCOUNTER — OFFICE VISIT (OUTPATIENT)
Facility: CLINIC | Age: 74
End: 2024-10-10
Payer: COMMERCIAL

## 2024-10-10 DIAGNOSIS — R26.9 GAIT DISORDER: ICD-10-CM

## 2024-10-10 DIAGNOSIS — R26.89 IMBALANCE: ICD-10-CM

## 2024-10-10 DIAGNOSIS — R42 DIZZINESS: Primary | ICD-10-CM

## 2024-10-10 PROCEDURE — 97112 NEUROMUSCULAR REEDUCATION: CPT

## 2024-10-10 NOTE — PROGRESS NOTES
Daily Note     Today's date: 10/10/2024  Patient name: Regino Huffman  : 1950  MRN: [id number]  Referring provider: Blanco Rojo DO  Dx:   Encounter Diagnosis     ICD-10-CM    1. Dizziness  R42       2. Gait disorder  R26.9       3. Imbalance  R26.89           POC expires Unit limit Auth Expiration date PT/OT + Visit Limit? Co-Insurance   24 16 v 10/1-  No and $10 Co-pay                               Visit/Unit Tracking  AUTH Status:  Date 10/1 10/3 10/8 10/10           16 visits  Used 1 1 1 1            Remaining  15 14 13 12               PCP    Neurologist/Last Seen    ENT/Last Seen    Psych/Last Seen    OT    Imaging        Subjective: Patient states that he has not been feeling dizzy and has been participating in increased aerobic activities with improved balance.       Objective: See treatment diary below    NMR:   - VORx1, firm, self selected pace, small letter: 120 sec   H: 0/10   V: 0/10  - STS with feet on foam pad, EC: 15x  - Sidestepping on foam beam w/ 180 deg turns to LE blaze pod taps: 1 min, 2 sets  - UE blaze pod taps at various heights:1 min, 2 sets  - Fwd hurdles w/ intermittent foam pads: 15ft, 3 laps    Assessment: Patient tolerated treatment well with a focus on vestibular based exercises and balance training. Discharge adaptation based exercises secondary to no reported symptoms onset in visually stimulating background. Patient displayed SoB during aerobic activities however, vitals with in normal exercise parameters; discussed possible contacting PCP regarding increased SoB with activity patient verbalized that he will contact PCP. Patient challenged with complaint surfaces however, he was able to complete activities without UE support and no overt LoB. Plan to continue with balance based exercises. Patient would benefit from continued PT to address limitations, reduce risk for falls, and improve tolerance to activity to improve overall function and promote  participation with grandchildren.    Plan: Continue per plan of care.        Outcome Measures Initial Eval  10/1/24   5xSTS 15.94 sec   10 meter 10.03 ft/sec  1.00 m/s   FGA 15/30   DGI 13/24   mCTSIB  - FTEO (firm)  - FTEC (firm)  - FTEO (foam)  - FTEC (foam)   30 sec  30 sec +  30 sec +  2 sec   6MWT defer

## 2024-10-11 ENCOUNTER — APPOINTMENT (EMERGENCY)
Dept: RADIOLOGY | Facility: HOSPITAL | Age: 74
DRG: 291 | End: 2024-10-11
Payer: COMMERCIAL

## 2024-10-11 ENCOUNTER — HOSPITAL ENCOUNTER (INPATIENT)
Facility: HOSPITAL | Age: 74
LOS: 3 days | Discharge: HOME | DRG: 291 | End: 2024-10-14
Attending: EMERGENCY MEDICINE | Admitting: INTERNAL MEDICINE
Payer: COMMERCIAL

## 2024-10-11 DIAGNOSIS — E78.5 HYPERLIPIDEMIA: Chronic | ICD-10-CM

## 2024-10-11 DIAGNOSIS — I48.91 ATRIAL FIBRILLATION WITH RVR (HCC): ICD-10-CM

## 2024-10-11 DIAGNOSIS — I48.91 ATRIAL FIBRILLATION WITH RAPID VENTRICULAR RESPONSE (HCC): Primary | ICD-10-CM

## 2024-10-11 PROBLEM — R73.03 PRE-DIABETES: Status: ACTIVE | Noted: 2024-10-11

## 2024-10-11 PROBLEM — I10 HYPERTENSION: Chronic | Status: ACTIVE | Noted: 2024-10-11

## 2024-10-11 PROBLEM — I45.10 RBBB: Status: ACTIVE | Noted: 2024-10-11

## 2024-10-11 PROBLEM — R79.89 ELEVATED BRAIN NATRIURETIC PEPTIDE (BNP) LEVEL: Status: ACTIVE | Noted: 2024-10-11

## 2024-10-11 LAB
2HR DELTA HS TROPONIN: 0 NG/L
ALBUMIN SERPL BCG-MCNC: 3.9 G/DL (ref 3.5–5)
ALP SERPL-CCNC: 41 U/L (ref 34–104)
ALT SERPL W P-5'-P-CCNC: 26 U/L (ref 7–52)
ANION GAP SERPL CALCULATED.3IONS-SCNC: 9 MMOL/L (ref 4–13)
AST SERPL W P-5'-P-CCNC: 19 U/L (ref 13–39)
BASOPHILS # BLD AUTO: 0.03 THOUSANDS/ΜL (ref 0–0.1)
BASOPHILS NFR BLD AUTO: 0 % (ref 0–1)
BILIRUB SERPL-MCNC: 0.84 MG/DL (ref 0.2–1)
BNP SERPL-MCNC: 940 PG/ML (ref 0–100)
BUN SERPL-MCNC: 25 MG/DL (ref 5–25)
CALCIUM SERPL-MCNC: 9.3 MG/DL (ref 8.4–10.2)
CARDIAC TROPONIN I PNL SERPL HS: 9 NG/L
CARDIAC TROPONIN I PNL SERPL HS: 9 NG/L
CHLORIDE SERPL-SCNC: 109 MMOL/L (ref 96–108)
CO2 SERPL-SCNC: 24 MMOL/L (ref 21–32)
CREAT SERPL-MCNC: 1.14 MG/DL (ref 0.6–1.3)
EOSINOPHIL # BLD AUTO: 0.03 THOUSAND/ΜL (ref 0–0.61)
EOSINOPHIL NFR BLD AUTO: 0 % (ref 0–6)
ERYTHROCYTE [DISTWIDTH] IN BLOOD BY AUTOMATED COUNT: 14.6 % (ref 11.6–15.1)
EST. AVERAGE GLUCOSE BLD GHB EST-MCNC: 123 MG/DL
GFR SERPL CREATININE-BSD FRML MDRD: 63 ML/MIN/1.73SQ M
GLUCOSE SERPL-MCNC: 89 MG/DL (ref 65–140)
HBA1C MFR BLD: 5.9 %
HCT VFR BLD AUTO: 47.3 % (ref 36.5–49.3)
HGB BLD-MCNC: 15.1 G/DL (ref 12–17)
IMM GRANULOCYTES # BLD AUTO: 0.08 THOUSAND/UL (ref 0–0.2)
IMM GRANULOCYTES NFR BLD AUTO: 1 % (ref 0–2)
LYMPHOCYTES # BLD AUTO: 2.17 THOUSANDS/ΜL (ref 0.6–4.47)
LYMPHOCYTES NFR BLD AUTO: 20 % (ref 14–44)
MCH RBC QN AUTO: 31.9 PG (ref 26.8–34.3)
MCHC RBC AUTO-ENTMCNC: 31.9 G/DL (ref 31.4–37.4)
MCV RBC AUTO: 100 FL (ref 82–98)
MONOCYTES # BLD AUTO: 0.79 THOUSAND/ΜL (ref 0.17–1.22)
MONOCYTES NFR BLD AUTO: 7 % (ref 4–12)
NEUTROPHILS # BLD AUTO: 8.02 THOUSANDS/ΜL (ref 1.85–7.62)
NEUTS SEG NFR BLD AUTO: 72 % (ref 43–75)
NRBC BLD AUTO-RTO: 0 /100 WBCS
PLATELET # BLD AUTO: 188 THOUSANDS/UL (ref 149–390)
PMV BLD AUTO: 9 FL (ref 8.9–12.7)
POTASSIUM SERPL-SCNC: 3.8 MMOL/L (ref 3.5–5.3)
PROT SERPL-MCNC: 6.2 G/DL (ref 6.4–8.4)
QRS AXIS: -36 DEGREES
QRSD INTERVAL: 120 MS
QT INTERVAL: 314 MS
QTC INTERVAL: 523 MS
RBC # BLD AUTO: 4.73 MILLION/UL (ref 3.88–5.62)
SODIUM SERPL-SCNC: 142 MMOL/L (ref 135–147)
T WAVE AXIS: -28 DEGREES
TSH SERPL DL<=0.05 MIU/L-ACNC: 0.52 UIU/ML (ref 0.45–4.5)
VENTRICULAR RATE: 167 BPM
WBC # BLD AUTO: 11.12 THOUSAND/UL (ref 4.31–10.16)

## 2024-10-11 PROCEDURE — 80053 COMPREHEN METABOLIC PANEL: CPT

## 2024-10-11 PROCEDURE — 84484 ASSAY OF TROPONIN QUANT: CPT

## 2024-10-11 PROCEDURE — 84443 ASSAY THYROID STIM HORMONE: CPT | Performed by: INTERNAL MEDICINE

## 2024-10-11 PROCEDURE — 71045 X-RAY EXAM CHEST 1 VIEW: CPT

## 2024-10-11 PROCEDURE — 99223 1ST HOSP IP/OBS HIGH 75: CPT | Performed by: INTERNAL MEDICINE

## 2024-10-11 PROCEDURE — 83880 ASSAY OF NATRIURETIC PEPTIDE: CPT

## 2024-10-11 PROCEDURE — 99222 1ST HOSP IP/OBS MODERATE 55: CPT | Performed by: INTERNAL MEDICINE

## 2024-10-11 PROCEDURE — 99291 CRITICAL CARE FIRST HOUR: CPT | Performed by: EMERGENCY MEDICINE

## 2024-10-11 PROCEDURE — 96374 THER/PROPH/DIAG INJ IV PUSH: CPT

## 2024-10-11 PROCEDURE — 83735 ASSAY OF MAGNESIUM: CPT | Performed by: NURSE PRACTITIONER

## 2024-10-11 PROCEDURE — 96375 TX/PRO/DX INJ NEW DRUG ADDON: CPT

## 2024-10-11 PROCEDURE — RECHECK: Performed by: INTERNAL MEDICINE

## 2024-10-11 PROCEDURE — 96365 THER/PROPH/DIAG IV INF INIT: CPT

## 2024-10-11 PROCEDURE — 99285 EMERGENCY DEPT VISIT HI MDM: CPT

## 2024-10-11 PROCEDURE — 83036 HEMOGLOBIN GLYCOSYLATED A1C: CPT | Performed by: PHYSICIAN ASSISTANT

## 2024-10-11 PROCEDURE — 93010 ELECTROCARDIOGRAM REPORT: CPT | Performed by: INTERNAL MEDICINE

## 2024-10-11 PROCEDURE — 36415 COLL VENOUS BLD VENIPUNCTURE: CPT

## 2024-10-11 PROCEDURE — 85025 COMPLETE CBC W/AUTO DIFF WBC: CPT

## 2024-10-11 PROCEDURE — 93005 ELECTROCARDIOGRAM TRACING: CPT

## 2024-10-11 PROCEDURE — 96376 TX/PRO/DX INJ SAME DRUG ADON: CPT

## 2024-10-11 PROCEDURE — 96366 THER/PROPH/DIAG IV INF ADDON: CPT

## 2024-10-11 RX ORDER — DILTIAZEM HYDROCHLORIDE 5 MG/ML
20 INJECTION INTRAVENOUS ONCE
Status: COMPLETED | OUTPATIENT
Start: 2024-10-11 | End: 2024-10-11

## 2024-10-11 RX ORDER — METOPROLOL SUCCINATE 50 MG/1
50 TABLET, EXTENDED RELEASE ORAL EVERY 12 HOURS SCHEDULED
Status: DISCONTINUED | OUTPATIENT
Start: 2024-10-11 | End: 2024-10-11

## 2024-10-11 RX ORDER — ONDANSETRON 2 MG/ML
4 INJECTION INTRAMUSCULAR; INTRAVENOUS EVERY 4 HOURS PRN
Status: DISCONTINUED | OUTPATIENT
Start: 2024-10-11 | End: 2024-10-14 | Stop reason: HOSPADM

## 2024-10-11 RX ORDER — FUROSEMIDE 10 MG/ML
40 INJECTION INTRAMUSCULAR; INTRAVENOUS ONCE
Status: DISCONTINUED | OUTPATIENT
Start: 2024-10-11 | End: 2024-10-11

## 2024-10-11 RX ORDER — DILTIAZEM HYDROCHLORIDE 5 MG/ML
15 INJECTION INTRAVENOUS ONCE
Status: COMPLETED | OUTPATIENT
Start: 2024-10-11 | End: 2024-10-11

## 2024-10-11 RX ORDER — FUROSEMIDE 10 MG/ML
40 INJECTION INTRAMUSCULAR; INTRAVENOUS ONCE
Status: COMPLETED | OUTPATIENT
Start: 2024-10-11 | End: 2024-10-11

## 2024-10-11 RX ORDER — ATORVASTATIN CALCIUM 20 MG/1
20 TABLET, FILM COATED ORAL
Status: DISCONTINUED | OUTPATIENT
Start: 2024-10-11 | End: 2024-10-14 | Stop reason: HOSPADM

## 2024-10-11 RX ORDER — METOPROLOL TARTRATE 1 MG/ML
5 INJECTION, SOLUTION INTRAVENOUS ONCE
Status: COMPLETED | OUTPATIENT
Start: 2024-10-11 | End: 2024-10-11

## 2024-10-11 RX ORDER — ACETAMINOPHEN 325 MG/1
650 TABLET ORAL EVERY 4 HOURS PRN
Status: DISCONTINUED | OUTPATIENT
Start: 2024-10-11 | End: 2024-10-14 | Stop reason: HOSPADM

## 2024-10-11 RX ORDER — METOPROLOL TARTRATE 25 MG/1
25 TABLET, FILM COATED ORAL EVERY 12 HOURS SCHEDULED
Status: DISCONTINUED | OUTPATIENT
Start: 2024-10-11 | End: 2024-10-11

## 2024-10-11 RX ORDER — ENOXAPARIN SODIUM 100 MG/ML
1 INJECTION SUBCUTANEOUS EVERY 12 HOURS SCHEDULED
Status: DISCONTINUED | OUTPATIENT
Start: 2024-10-11 | End: 2024-10-13

## 2024-10-11 RX ORDER — DIGOXIN 125 MCG
250 TABLET ORAL EVERY 6 HOURS
Status: COMPLETED | OUTPATIENT
Start: 2024-10-11 | End: 2024-10-11

## 2024-10-11 RX ORDER — POTASSIUM CHLORIDE 1500 MG/1
20 TABLET, EXTENDED RELEASE ORAL ONCE
Status: COMPLETED | OUTPATIENT
Start: 2024-10-12 | End: 2024-10-11

## 2024-10-11 RX ADMIN — METOPROLOL TARTRATE 5 MG: 5 INJECTION INTRAVENOUS at 13:30

## 2024-10-11 RX ADMIN — Medication 12.5 MG: at 16:02

## 2024-10-11 RX ADMIN — DILTIAZEM HYDROCHLORIDE 15 MG: 5 INJECTION INTRAVENOUS at 11:25

## 2024-10-11 RX ADMIN — FUROSEMIDE 40 MG: 10 INJECTION, SOLUTION INTRAMUSCULAR; INTRAVENOUS at 16:02

## 2024-10-11 RX ADMIN — DILTIAZEM HYDROCHLORIDE 15 MG/HR: 5 INJECTION, SOLUTION INTRAVENOUS at 19:13

## 2024-10-11 RX ADMIN — ENOXAPARIN SODIUM 80 MG: 80 INJECTION SUBCUTANEOUS at 21:25

## 2024-10-11 RX ADMIN — DIGOXIN 250 MCG: 125 TABLET ORAL at 18:25

## 2024-10-11 RX ADMIN — ATORVASTATIN CALCIUM 20 MG: 20 TABLET, FILM COATED ORAL at 16:02

## 2024-10-11 RX ADMIN — POTASSIUM CHLORIDE 20 MEQ: 1500 TABLET, EXTENDED RELEASE ORAL at 23:54

## 2024-10-11 RX ADMIN — DILTIAZEM HYDROCHLORIDE 5 MG/HR: 5 INJECTION, SOLUTION INTRAVENOUS at 12:02

## 2024-10-11 RX ADMIN — METOPROLOL TARTRATE 5 MG: 5 INJECTION INTRAVENOUS at 14:24

## 2024-10-11 RX ADMIN — DIGOXIN 250 MCG: 125 TABLET ORAL at 23:54

## 2024-10-11 RX ADMIN — DILTIAZEM HYDROCHLORIDE 20 MG: 5 INJECTION INTRAVENOUS at 12:47

## 2024-10-11 NOTE — ASSESSMENT & PLAN NOTE
Prior to admission on metoprolol and amlodipine.  Holding amlodipine while undergoing rate control of atrial fibrillation

## 2024-10-11 NOTE — ASSESSMENT & PLAN NOTE
Patient with documented history of paroxysmal atrial fibrillation in June 2022 while hospitalized for COVID-19.  Presented on 10/11/24 for evaluation for shortness of breath, palpitations and intermittent chest discomfort. On presentation patient noted to be in atrial fibrillation with RVR, ventricular rate 167 bpm.  Patient received one-time dose of Cardizem 15 mg followed by an additional dose of Cardizem 120 mg.  Also received 2 doses of Lopressor 5 mg IV.  Patient does have improvement in heart rate.  Was started on a Cardizem drip.   10/11/24 EKG: Atrial fibrillation with rapid ventricular response, 167 bpm.  RBBB.  10/11/24 hs troponin: 9 (0hrs), 9 (2hrs).   Start Lopressor 12.5 mg twice daily.  Currently on Cardizem drip.  If heart rate remains elevated can consider IV digoxin.  [id number] stroke risk score: 2 points, moderate to high risk.  Review of home medications notes that patient is not on anticoagulation, unclear as to why.  Will start therapeutic Lovenox.  12/23/22 TTE: LVEF 60%.  Diastolic function normal for age.  Mitral valve with trace regurgitation. Tricuspid valve trace regurgitation.  Repeat TTE ordered.

## 2024-10-11 NOTE — ASSESSMENT & PLAN NOTE
History of paroxysmal atrial fibrillation while hospitalized for COVID, hypertension, hyperlipidemia who presented to the hospital for worsening shortness of breath.    In the ED he was found to have rapid atrial fibrillation and given diltiazem and IV metoprolol without much improvement prompting admission.   Seen by cardiology.  Continue diltiazem infusion.  Metoprolol added.  Anticoagulation: Weight-based enoxaparin ordered.

## 2024-10-11 NOTE — ASSESSMENT & PLAN NOTE
6/26/24 lipid panel: Cholesterol 233, triglycerides 192, HDL 74, .  Review of home meds notes that patient is not on statin therapy.  ASCVD 10-year risk score 21.8%, high risk.  Start Lipitor 20 mg daily.

## 2024-10-11 NOTE — H&P
H&P - Hospitalist   Name: Regino Huffman 74 y.o. male I MRN: [id number]  Unit/Bed#: ED 04 I Date of Admission: 10/11/2024   Date of Service: 10/11/2024 I Hospital Day: 0     Assessment & Plan  Atrial fibrillation with RVR (HCC)  History of paroxysmal atrial fibrillation while hospitalized for COVID, hypertension, hyperlipidemia who presented to the hospital for worsening shortness of breath.    In the ED he was found to have rapid atrial fibrillation and given diltiazem and IV metoprolol without much improvement prompting admission.   Seen by cardiology.  Continue diltiazem infusion.  Metoprolol added.  Anticoagulation: Weight-based enoxaparin ordered.  Hypertension  Prior to admission on metoprolol and amlodipine.  Holding amlodipine while undergoing rate control of atrial fibrillation  Hyperlipidemia  Not on statin.  Cardiology starting atorvastatin today  RBBB  Chronic upon review  Pre-diabetes  A1c 5.7.  Repeat ordered.    Results from last 7 days   Lab Units 10/11/24  1113   GLUCOSE RANDOM mg/dL 89     Elevated brain natriuretic peptide (BNP) level  Presentation with shortness of breath found to have elevated BNP  Given one-time dose of furosemide.  Follow-up on echocardiogram.    VTE Pharmacologic Prophylaxis:   Moderate Risk (Score 3-4) - Pharmacological DVT Prophylaxis Ordered: enoxaparin (Lovenox).  Code Status: Level 1 - Full Code   Discussion with family: Updated  (daughter) via phone.    Anticipated Length of Stay: Patient will be admitted on an inpatient basis with an anticipated length of stay of greater than 2 midnights secondary to rapid atrial fibrillation.    Chief Complaint:     Shortness of Breath (Patient states yesterday his heart rate was elevated during PT and last night he had SOB while laying flat and had chest pain.  Currently denies feeling SOB or having pain.)    History of Present Illness  Regino Huffman is a 74 y.o. male with a PMH of paroxysmal atrial fibrillation  hypertension and dyslipidemia who presented to the hospital with worsening shortness of breath.  In the ED he was found to have rapid atrial fibrillation and was given intravenous metoprolol and diltiazem with only slight improvement.  He has been admitted and seen by cardiology.  In any chest pain or palpitations.  No nausea vomiting diarrhea.  He reports he tries to stay healthy and eats a lot of fruits and vegetables.  He does consume coffee but not to excess.  He walks daily with no difficulty until recently when he has had exertional dyspnea.    Review of Systems   Constitutional:  Negative for fever.   HENT:  Negative for facial swelling.    Eyes:  Negative for visual disturbance.   Respiratory:  Positive for shortness of breath.    Cardiovascular:  Negative for chest pain and palpitations.   Gastrointestinal:  Negative for abdominal distention, abdominal pain, diarrhea, nausea and vomiting.   Genitourinary:  Negative for hematuria.   Musculoskeletal:  Negative for myalgias.   Skin:  Negative for rash.   Neurological:  Negative for dizziness, speech difficulty and headaches.   Psychiatric/Behavioral:  The patient is not nervous/anxious.    All other systems reviewed and are negative.      Past Medical and Surgical History:   Past Medical History:   Diagnosis Date    A-fib (HCC)     Arthritis     shoulder    BPH (benign prostatic hyperplasia)     Disease of thyroid gland     nodules    Hypertension     Kidney calculi     left    Kidney stone     Nocturia     Urinary incontinence     During the night, urinary frequency during the day.     Past Surgical History:   Procedure Laterality Date    COLONOSCOPY      EXTRACORPOREAL SHOCK WAVE LITHOTRIPSY      FRACTURE SURGERY Right     ankle with hardware    MA CYSTO/URETERO W/LITHOTRIPSY &INDWELL STENT INSRT Left 07/18/2021    Procedure: CYSTOSCOPY URETEROSCOPY WITH LITHOTRIPSY HOLMIUM LASER, RETROGRADE PYELOGRAM AND INSERTION STENT URETERAL;  Surgeon: Rajesh Méndez,  MD;  Location: [facility];  Service: Urology    TRANSURETHRAL RESECTION OF PROSTATE      US GUIDED THYROID BIOPSY  10/25/2021    US GUIDED THYROID BIOPSY  2022     Meds/Allergies:  Allergies: No Known Allergies  Prior to Admission Medications   Prescriptions Last Dose Informant Patient Reported? Taking?   Calcium Citrate-Vitamin D 250 mg-2.5 mcg tablet  Self Yes No   Sig: Take 1 tablet by mouth 2 (two) times a day   amLODIPine (NORVASC) 5 mg tablet 10/10/2024  No Yes   Sig: Take 1 tablet (5 mg total) by mouth daily   meloxicam (MOBIC) 15 mg tablet   No No   Sig: Take 1 tablet (15 mg total) by mouth daily as needed for moderate pain   metoprolol succinate (TOPROL-XL) 50 mg 24 hr tablet 10/10/2024  No Yes   Sig: Take 1 tablet (50 mg total) by mouth daily      Facility-Administered Medications: None     Social History:     Social History     Socioeconomic History    Marital status:      Spouse name: Not on file    Number of children: Not on file    Years of education: Not on file    Highest education level: Not on file   Occupational History    Not on file   Tobacco Use    Smoking status: Former     Current packs/day: 0.00     Average packs/day: 0.5 packs/day for 41.0 years (20.5 ttl pk-yrs)     Types: Cigarettes     Start date: 1964     Quit date: 2005     Years since quittin.7     Passive exposure: Never    Smokeless tobacco: Never   Vaping Use    Vaping status: Never Used   Substance and Sexual Activity    Alcohol use: Not Currently     Comment: rare    Drug use: No    Sexual activity: Not on file   Other Topics Concern    Not on file   Social History Narrative    Not on file     Social Determinants of Health     Financial Resource Strain: Low Risk  (10/30/2023)    Overall Financial Resource Strain (CARDIA)     Difficulty of Paying Living Expenses: Not very hard   Food Insecurity: Not on file   Transportation Needs: No Transportation Needs (10/30/2023)    PRAPARE - Transportation     Lack  of Transportation (Medical): No     Lack of Transportation (Non-Medical): No   Physical Activity: Not on file   Stress: Not on file   Social Connections: Not on file   Intimate Partner Violence: Not on file   Housing Stability: Not on file     Patient Pre-hospital Living Situation: Home  Patient Pre-hospital Level of Mobility:   Patient Pre-hospital Diet Restrictions:     Objective   Vitals:   Blood Pressure: 113/83 (10/11/24 1415)  Pulse: (!) 122 (10/11/24 1415)  Temperature: 98.2 °F (36.8 °C) (10/11/24 1100)  Temp Source: Tympanic (10/11/24 1100)  Respirations: (!) 29 (10/11/24 1415)  Weight - Scale: 82 kg (180 lb 12.4 oz) (10/11/24 1142)  SpO2: 95 % (10/11/24 1415)    Physical Exam  Vitals reviewed.   Constitutional:       General: He is not in acute distress.     Appearance: Normal appearance.   HENT:      Head: Atraumatic.   Eyes:      General: No scleral icterus.  Cardiovascular:      Rate and Rhythm: Tachycardia present. Rhythm irregular.      Heart sounds: Normal heart sounds.   Pulmonary:      Effort: Pulmonary effort is normal. No respiratory distress.   Abdominal:      General: Bowel sounds are normal. There is no distension.      Palpations: Abdomen is soft.      Tenderness: There is no abdominal tenderness.   Musculoskeletal:         General: No swelling or tenderness.      Cervical back: Neck supple.   Skin:     General: Skin is warm and dry.   Neurological:      General: No focal deficit present.      Mental Status: He is alert and oriented to person, place, and time.      Motor: No weakness.   Psychiatric:         Mood and Affect: Mood normal.         Additional Data:   Lab Results: I have reviewed the following results:  Results from last 7 days   Lab Units 10/11/24  1113   WBC Thousand/uL 11.12*   HEMOGLOBIN g/dL 15.1   HEMATOCRIT % 47.3   PLATELETS Thousands/uL 188   SEGS PCT % 72   LYMPHO PCT % 20   MONO PCT % 7   EOS PCT % 0     Results from last 7 days   Lab Units 10/11/24  1113   SODIUM mmol/L  142   POTASSIUM mmol/L 3.8   CHLORIDE mmol/L 109*   CO2 mmol/L 24   ANION GAP mmol/L 9   BUN mg/dL 25   CREATININE mg/dL 1.14   CALCIUM mg/dL 9.3   ALBUMIN g/dL 3.9   TOTAL BILIRUBIN mg/dL 0.84   ALK PHOS U/L 41   ALT U/L 26   AST U/L 19   EGFR ml/min/1.73sq m 63   GLUCOSE RANDOM mg/dL 89             Results from last 7 days   Lab Units 10/11/24  1306 10/11/24  1113   HS TNI 0HR ng/L  --  9   HS TNI 2HR ng/L 9  --              Lines/Drains  Invasive Devices       Peripheral Intravenous Line  Duration             Peripheral IV 10/11/24 Left Antecubital <1 day    Peripheral IV 10/11/24 Left Wrist <1 day                    Imaging:   Personally reviewed the following image studies in PACS and associated radiology reports:  XR chest 1 view portable    Result Date: 10/11/2024  Impression: No acute cardiopulmonary disease. Workstation performed: [id number]       EKG, Pathology, and Other Studies Reviewed on Admission:   EKG  Result Date: 10/11/24  Personally reviewed strips with impression of: Atrial fibrillation 167 bpm    Administrative Statements       ** Please Note: This note has been constructed using a voice recognition system. **

## 2024-10-11 NOTE — ASSESSMENT & PLAN NOTE
Presentation with shortness of breath found to have elevated BNP  Given one-time dose of furosemide.  Follow-up on echocardiogram.

## 2024-10-11 NOTE — ASSESSMENT & PLAN NOTE
BP currently acceptable.   Start Lopressor 12.5 mg twice daily.  Currently on Cardizem drip in setting of atrial fibrillation with RVR.  Review of home medications notes that patient is on amlodipine 5 mg daily and Toprol-XL 50 mg daily.  Continue to monitor BP.

## 2024-10-11 NOTE — ASSESSMENT & PLAN NOTE
10/11/24 BNP: 940.   Patient with poor air entry on physical exam and trace bilateral lower extremity edema.  10/11/24 CXR: No acute cardiopulmonary disease.   Will order one-time dose of Lasix 40 mg IV.  Follow-up TTE.  Monitor I/Os.

## 2024-10-11 NOTE — ASSESSMENT & PLAN NOTE
A1c 5.7.  Repeat ordered.    Results from last 7 days   Lab Units 10/11/24  1113   GLUCOSE RANDOM mg/dL 89

## 2024-10-11 NOTE — QUICK NOTE
Progress Note - Triage Asssessment   Regino Huffman 74 y.o. male MRN: [id number]    Time Called ( Time): 1330  Date Called: 10/11/24  Room#: ER 4  Person requesting evaluation: Dr. Lewis    Situation:    75yo M presenting to ER with 3-day hx of CP, SOB. Found to be in AF RVR with rates >150 on admission to ER. Administered Cardizem bolus x2, initiated on Cardizem gtt, trialed lopressor IV x2 with improvement of HR to 110-115.     On exam, he is speaking long sentences on telephone, awake and alert and in no acute distress. Lungs CTA, though diminished in bases. No LE edema. He reports resolution of his CP and SOB.     Patient is stable for admit to [facility]. Discussed with Dr. Arteaga.      Interventions:   Suggestions:  Rate control for goal <120  Discuss AC with Cardiology and patient   Continuous tele  Consider echo        Triage Assessment:     Patient can be admitted to med-surg level of care    Recommendations discussed with Floyd Lewis and [place]

## 2024-10-11 NOTE — CONSULTS
Consultation - Cardiology   [facility]     Regino Huffman 74 y.o. male MRN: [id number]  : 1950  Unit/Bed#: [address] [place] [address] Encounter: [id number]      Assessment & Plan     Assessment & Plan  Atrial fibrillation with RVR (HCC)  Patient with documented history of paroxysmal atrial fibrillation in 2022 while hospitalized for COVID-19.  Presented on 10/11/24 for evaluation for shortness of breath, palpitations and intermittent chest discomfort. On presentation patient noted to be in atrial fibrillation with RVR, ventricular rate 167 bpm.  Patient received one-time dose of Cardizem 15 mg followed by an additional dose of Cardizem 120 mg.  Also received 2 doses of Lopressor 5 mg IV.  Patient does have improvement in heart rate.  Was started on a Cardizem drip.   10/11/24 EKG: Atrial fibrillation with rapid ventricular response, 167 bpm.  RBBB.  10/11/24 hs troponin: 9 (0hrs), 9 (2hrs).   Start Lopressor 12.5 mg twice daily.  Currently on Cardizem drip.  If heart rate remains elevated can consider IV digoxin.  [id number] stroke risk score: 2 points, moderate to high risk.  Review of home medications notes that patient is not on anticoagulation, unclear as to why.  Will start therapeutic Lovenox.  22 TTE: LVEF 60%.  Diastolic function normal for age.  Mitral valve with trace regurgitation. Tricuspid valve trace regurgitation.  Repeat TTE ordered.   Elevated brain natriuretic peptide (BNP) level  10/11/24 BNP: 940.   Patient with poor air entry on physical exam and trace bilateral lower extremity edema.  10/11/24 CXR: No acute cardiopulmonary disease.   Will order one-time dose of Lasix 40 mg IV.  Follow-up TTE.  Monitor I/Os.   Hypertension  BP currently acceptable.   Start Lopressor 12.5 mg twice daily.  Currently on Cardizem drip in setting of atrial fibrillation with RVR.  Review of home medications notes that patient is on amlodipine 5 mg daily and Toprol-XL 50 mg  daily.  Continue to monitor BP.  Hyperlipidemia  6/26/24 lipid panel: Cholesterol 233, triglycerides 192, HDL 74, .  Review of home meds notes that patient is not on statin therapy.  ASCVD 10-year risk score 21.8%, high risk.  Start Lipitor 20 mg daily.  RBBB  Chronic, noted on EKG since 2022.  Pre-diabetes  12/23/22 HgbA1c: 5.7.   Repeat HgbA1c ordered.   Care per primary team.     Summary of Recommendations:        Thank you for your consultation.    Physician Requesting Consult: Lewis Srivastava DO    Reason for Consult / Principal Problem: Atrial fibrillation with RVR.    Inpatient consult to Cardiology  Consult performed by: Marisol Price PA-C  Consult ordered by: Lewis Srivastava DO          HPI: Regino Huffman is a 74 y.o. male with PMHx of paroxysmal atrial fibrillation (not on anticoagulation), essential hypertension, hyperlipidemia, chronic RBBB, pre-diabetes, who presented on 10/11/24 for evaluation for shortness of breath, palpitations and intermittent chest discomfort.     On presentation patient noted to be in atrial fibrillation with RVR, ventricular rate 167 bpm.  Patient received one-time dose of Cardizem 15 mg followed by an additional dose of Cardizem 120 mg.  Also received 2 doses of Lopressor 5 mg IV.  Patient does have improvement in heart rate.  Was started on a Cardizem drip and admitted to the hospital.    Review of Systems   Constitutional:  Positive for activity change. Negative for appetite change, chills, diaphoresis, fatigue, fever and unexpected weight change.   Respiratory:  Positive for chest tightness and shortness of breath. Negative for wheezing.    Cardiovascular:  Positive for palpitations and leg swelling. Negative for chest pain.   Gastrointestinal:  Negative for abdominal distention, abdominal pain, constipation, diarrhea, nausea and vomiting.   Neurological:  Negative for dizziness, syncope, weakness, light-headedness, numbness and headaches.       Historical Information    Past Medical History:   Diagnosis Date    A-fib (HCC)     Arthritis     shoulder    BPH (benign prostatic hyperplasia)     Disease of thyroid gland     nodules    Hypertension     Kidney calculi     left    Kidney stone     Nocturia     Urinary incontinence     During the night, urinary frequency during the day.     Past Surgical History:   Procedure Laterality Date    COLONOSCOPY      EXTRACORPOREAL SHOCK WAVE LITHOTRIPSY      FRACTURE SURGERY Right     ankle with hardware    IN CYSTO/URETERO W/LITHOTRIPSY &INDWELL STENT INSRT Left 2021    Procedure: CYSTOSCOPY URETEROSCOPY WITH LITHOTRIPSY HOLMIUM LASER, RETROGRADE PYELOGRAM AND INSERTION STENT URETERAL;  Surgeon: Rajesh Méndez MD;  Location: [facility];  Service: Urology    TRANSURETHRAL RESECTION OF PROSTATE      US GUIDED THYROID BIOPSY  10/25/2021    US GUIDED THYROID BIOPSY  2022     Social History     Substance and Sexual Activity   Alcohol Use Not Currently    Comment: rare     Social History     Substance and Sexual Activity   Drug Use No     Social History     Tobacco Use   Smoking Status Former    Current packs/day: 0.00    Average packs/day: 0.5 packs/day for 41.0 years (20.5 ttl pk-yrs)    Types: Cigarettes    Start date: 1964    Quit date: 2005    Years since quittin.7    Passive exposure: Never   Smokeless Tobacco Never     Family History: History reviewed. No pertinent family history.    Meds/Allergies    PTA meds:    Medications Prior to Admission:     amLODIPine (NORVASC) 5 mg tablet    metoprolol succinate (TOPROL-XL) 50 mg 24 hr tablet    Calcium Citrate-Vitamin D 250 mg-2.5 mcg tablet    meloxicam (MOBIC) 15 mg tablet   No Known Allergies    Current Facility-Administered Medications:     diltiazem (CARDIZEM) 125 mg in sodium chloride 0.9 % 125 mL infusion, 1-15 mg/hr, Intravenous, Titrated, Moi Lewis MD, Last Rate: 15 mL/hr at 10/11/24 1303, 15 mg/hr at 10/11/24 1303    furosemide (LASIX) injection 40  mg, 40 mg, Intravenous, Once, Moi Lewis MD    metoprolol succinate (TOPROL-XL) 24 hr tablet 50 mg, 50 mg, Oral, Q12H [facility] DO    VTE Pharmacologic Prophylaxis:   Enoxaparin (Lovenox)    Objective:   Vitals: Blood pressure 113/83, pulse (!) 122, temperature 98.2 °F (36.8 °C), temperature source Tympanic, resp. rate (!) 29, weight 82 kg (180 lb 12.4 oz), SpO2 95%.  Body mass index is 29.18 kg/m².  Wt Readings from Last 3 Encounters:   10/11/24 82 kg (180 lb 12.4 oz)   09/23/24 72.3 kg (159 lb 6.4 oz)   07/08/24 72.6 kg (160 lb)     BP Readings from Last 3 Encounters:   10/11/24 113/83   09/23/24 136/88   07/08/24 136/74     Orthostatic Blood Pressures      Flowsheet Row Most Recent Value   Blood Pressure 113/83 filed at 10/11/2024 1415   Patient Position - Orthostatic VS Lying filed at 10/11/2024 1215          No intake or output data in the 24 hours ending 10/11/24 1457    Invasive Devices       Peripheral Intravenous Line  Duration             Peripheral IV 10/11/24 Left Antecubital <1 day    Peripheral IV 10/11/24 Left Wrist <1 day                      Physical Exam:   Physical Exam  Vitals reviewed.   Constitutional:       General: He is not in acute distress.  Cardiovascular:      Rate and Rhythm: Tachycardia present. Rhythm irregular.      Pulses: Normal pulses.      Heart sounds: No murmur heard.  Pulmonary:      Effort: Pulmonary effort is normal. No respiratory distress.      Breath sounds: Decreased air movement present. Decreased breath sounds present.   Abdominal:      General: Abdomen is flat. There is no distension.      Palpations: Abdomen is soft.      Tenderness: There is no abdominal tenderness.   Musculoskeletal:      Right lower leg: Edema present.      Left lower leg: Edema present.   Skin:     General: Skin is warm and dry.   Neurological:      Mental Status: He is alert and oriented to person, place, and time.         Labs:   Troponins:  Results from last 7 days   Lab  Units 10/11/24  1306   HSTNI D2 ng/L 0       CBC with diff:   Results from last 7 days   Lab Units 10/11/24  1113   WBC Thousand/uL 11.12*   HEMOGLOBIN g/dL 15.1   HEMATOCRIT % 47.3   MCV fL 100*   PLATELETS Thousands/uL 188   RBC Million/uL 4.73   MCH pg 31.9   MCHC g/dL 31.9   RDW % 14.6   MPV fL 9.0   NRBC AUTO /100 WBCs 0       CMP:   Results from last 7 days   Lab Units 10/11/24  1113   SODIUM mmol/L 142   POTASSIUM mmol/L 3.8   CHLORIDE mmol/L 109*   CO2 mmol/L 24   ANION GAP mmol/L 9   BUN mg/dL 25   CREATININE mg/dL 1.14   CALCIUM mg/dL 9.3   AST U/L 19   ALT U/L 26   ALK PHOS U/L 41   TOTAL PROTEIN g/dL 6.2*   ALBUMIN g/dL 3.9   TOTAL BILIRUBIN mg/dL 0.84   EGFR ml/min/1.73sq m 63   GLUCOSE RANDOM mg/dL 89     Lipid Profile:   Lab Results   Component Value Date    CHOLESTEROL 233 (H) 06/26/2024    HDL 74 06/26/2024    LDLCALC 121 (H) 06/26/2024    TRIG 192 (H) 06/26/2024     Imaging & Testing     Cardiac testing:   Exercise stress test  Result date: 8/24/23      The stress ECG is negative for ischemia after maximal exercise without chest pain but with significant fatigue requiring stopping the treadmill after 1 minute and 31 seconds.  Patient's exercise capacity is severely diminished.  Because of baseline RBBB and decreased exercise capacity, specificity of the study is limited.  If clinically indicated, a pharmacologic nuclear stress test may be performed.        Resting ECG    Resting ECG ECG is abnormal. The ECG shows normal sinus rhythm. There are nonspecific ST and T changes. ECG demonstrates right bundle branch block.   Stress Findings    Stress Findings A Maicol protocol stress test was performed. Lungs, no wheezing auscultated. The patient reached stage 1.0 of the protocol after exercising for 1 min and 31 sec and had a maximal HR of 137 bpm (93 % of MPHR) and 4.6 METS. The patient experienced no angina during the test. The test was stopped because the patient experienced fatigue and dyspnea. The  patient reported dyspnea and fatigue during the stress test. Onset of symptoms occurred at stage 1 of the protocol. Symptoms ended during recovery. Blood pressure demonstrated a normal response and heart rate demonstrated a normal response to stress. The patient's heart rate recovery was normal.   Stress ECG    Stress ECG No significant ST segment changes from baseline There were no arrhythmias during stress. There were no arrhythmias during recovery. . The ECG was negative for ischemia. The stress ECG is negative for ischemia after maximal exercise without chest pain but with significant fatigue requiring stopping the treadmill after 1 minute and 31 seconds.  Patient's exercise capacity is severely diminished.  Because of baseline RBBB and decreased exercise capacity, specificity of the study is limited.  If clinically indicated, a pharmacologic nuclear stress test may be performed.       Echo   Result date: 12/23/22    Left Ventricle Left ventricular cavity size is normal. Wall thickness is normal. The left ventricular ejection fraction is 60% by visual estimation. Systolic function is normal.  Wall motion is normal. Diastolic function is normal for age.  Left atrial filling pressure is normal.   Right Ventricle Right ventricular cavity size is normal. Systolic function is normal. Wall thickness is normal.   Left Atrium The atrium is normal in size (16-34 mL/m2).   Right Atrium The atrium is normal in size.   Aortic Valve The aortic valve is trileaflet. The leaflets are not thickened. The leaflets are moderately calcified. There is mildly reduced mobility. There is no evidence of regurgitation. The aortic valve has no significant stenosis.   Mitral Valve Mitral valve structure is normal. There is trace regurgitation. There is no evidence of stenosis.   Tricuspid Valve Tricuspid valve structure is normal. There is trace regurgitation. There is no evidence of stenosis. The right ventricular systolic pressure is  normal. The estimated right ventricular systolic pressure is 22.00 mmHg.   Pulmonic Valve Pulmonic valve structure is normal. There is trace regurgitation. There is no evidence of stenosis.   Ascending Aorta The aortic root is normal in size.   IVC/SVC The right atrial pressure is estimated at 8.0 mmHg. The inferior vena cava is normal in size.   Pericardium There is no pericardial effusion. The pericardium is normal in appearance.           Imaging: Results Review Statement: I reviewed radiology reports from this admission including: chest xray.  XR chest 1 view portable    Result Date: 10/11/2024    Impression: No acute cardiopulmonary disease.     EKG/ Monitor: Personally reviewed.     10/11/24 EKG: Atrial fibrillation with rapid ventricular response, 167 bpm.  RBBB.     Code Status: Prior  Advance Directive and Living Will:      POLST:        Marisol Price PA-C

## 2024-10-11 NOTE — ED PROVIDER NOTES
Time reflects when diagnosis was documented in both MDM as applicable and the Disposition within this note       Time User Action Codes Description Comment    10/11/2024  2:05 PM Moi Lewis Add [I48.91] Atrial fibrillation with rapid ventricular response (HCC)           ED Disposition       ED Disposition   Admit    Condition   Stable    Date/Time   Fri Oct 11, 2024  2:05 PM    Comment   Case was discussed with JENARO and the patient's admission status was agreed to be Admission Status: inpatient status to the service of Dr. Srivastava .               Assessment & Plan       Medical Decision Making  Patient in apparent new onset atrial fibrillation with rapid ventricular response.  Rate in the 160s but overall well-appearing, complaining of shortness of breath but denying chest pain.  Patient with bilateral lower extremity edema and rales in the lung bases concerning for CHF.  I ordered and reviewed lab work including CBC, CMP, troponin, BNP.  I treated patient with Cardizem bolus and drip.  I ordered and independently interpreted an EKG which demonstrates atrial fibrillation with rapid ventricular response rate of 167 with left axis deviation, right bundle branch block, nonspecific likely rate related ST changes. Patient with an elevated BNP, normal troponin.  Patient remaining in atrial fibrillation with rapid ventricular response despite multiple Cardizem boluses, Cardizem drip, and Lopressor bolus but does have moderate improvement of heart rate.  I discussed patient with the critical care team and the hospitalist with agreement to admit patient level to stepdown to the hospitalist.    Risk  Prescription drug management.  Decision regarding hospitalization.             Medications   diltiazem (CARDIZEM) 125 mg in sodium chloride 0.9 % 125 mL infusion (15 mg/hr Intravenous Rate/Dose Change 10/11/24 1303)   furosemide (LASIX) injection 40 mg (0 mg Intravenous Hold 10/11/24 1301)   metoprolol succinate  (TOPROL-XL) 24 hr tablet 50 mg (has no administration in time range)   diltiazem (CARDIZEM) injection 15 mg (15 mg Intravenous Given 10/11/24 1125)   diltiazem (CARDIZEM) injection 20 mg (20 mg Intravenous Given 10/11/24 1247)   metoprolol (LOPRESSOR) injection 5 mg (5 mg Intravenous Given 10/11/24 1330)   metoprolol (LOPRESSOR) injection 5 mg (5 mg Intravenous Given 10/11/24 1424)       ED Risk Strat Scores                                               History of Present Illness       Chief Complaint   Patient presents with    Shortness of Breath     Patient states yesterday his heart rate was elevated during PT and last night he had SOB while laying flat and had chest pain.  Currently denies feeling SOB or having pain.       Past Medical History:   Diagnosis Date    A-fib (HCC)     Arthritis     shoulder    BPH (benign prostatic hyperplasia)     Disease of thyroid gland     nodules    Hypertension     Kidney calculi     left    Kidney stone     Nocturia     Urinary incontinence     During the night, urinary frequency during the day.      Past Surgical History:   Procedure Laterality Date    COLONOSCOPY      EXTRACORPOREAL SHOCK WAVE LITHOTRIPSY      FRACTURE SURGERY Right     ankle with hardware    NV CYSTO/URETERO W/LITHOTRIPSY &INDWELL STENT INSRT Left 2021    Procedure: CYSTOSCOPY URETEROSCOPY WITH LITHOTRIPSY HOLMIUM LASER, RETROGRADE PYELOGRAM AND INSERTION STENT URETERAL;  Surgeon: Rajesh Méndez MD;  Location: [facility];  Service: Urology    TRANSURETHRAL RESECTION OF PROSTATE      US GUIDED THYROID BIOPSY  10/25/2021    US GUIDED THYROID BIOPSY  2022      History reviewed. No pertinent family history.   Social History     Tobacco Use    Smoking status: Former     Current packs/day: 0.00     Average packs/day: 0.5 packs/day for 41.0 years (20.5 ttl pk-yrs)     Types: Cigarettes     Start date: 1964     Quit date: 2005     Years since quittin.7     Passive exposure: Never     Smokeless tobacco: Never   Vaping Use    Vaping status: Never Used   Substance Use Topics    Alcohol use: Not Currently     Comment: rare    Drug use: No      E-Cigarette/Vaping    E-Cigarette Use Never User       E-Cigarette/Vaping Substances    Nicotine No     THC No     CBD No     Flavoring No     Other No     Unknown No       I have reviewed and agree with the history as documented.     Patient is a 71-year-old male presenting for evaluation of several days of shortness of breath, palpitations, intermittent chest pain.  Patient states some component of bilateral leg swelling over the course of the past week, is unsure if he has gained any weight.  Patient states intermittent mild nonproductive cough.  Patient denies ongoing chest pain in the emergency department but remains short of breath.  Patient noted to be in atrial fibrillation with rapid ventricular response, denies prior history of this.  Patient denies fevers, chills, nausea, vomiting, syncope or near syncope.        Review of Systems   Constitutional:  Negative for chills, fatigue and fever.   Respiratory:  Positive for shortness of breath. Negative for cough and wheezing.    Cardiovascular:  Positive for chest pain and leg swelling.   Gastrointestinal:  Negative for diarrhea, nausea and vomiting.   Musculoskeletal:  Negative for arthralgias and myalgias.   Neurological:  Negative for headaches.   Psychiatric/Behavioral:  Negative for confusion.    All other systems reviewed and are negative.          Objective       ED Triage Vitals [10/11/24 1100]   Temperature Pulse Blood Pressure Respirations SpO2 Patient Position - Orthostatic VS   98.2 °F (36.8 °C) (!) 158 151/100 (!) 24 96 % Lying      Temp Source Heart Rate Source BP Location FiO2 (%) Pain Score    Tympanic Monitor Left arm -- No Pain      Vitals      Date and Time Temp Pulse SpO2 Resp BP Pain Score FACES Pain Rating User   10/11/24 1415 -- 122 95 % 29 113/83 -- -- JG   10/11/24 1334 -- -- --  -- 118/78 -- -- JG   10/11/24 1330 -- 134 95 % 31 -- -- -- JG   10/11/24 1315 -- 130 92 % 26 -- -- -- JG   10/11/24 1300 -- 132 92 % 26 -- -- -- JG   10/11/24 1255 -- 128 -- 26 -- -- -- JG   10/11/24 1247 -- 143 -- -- 128/78 -- -- JG   10/11/24 1245 -- 144 93 % 33 -- -- -- JG   10/11/24 1230 -- 146 92 % 31 -- -- -- J   10/11/24 1215 -- 150 94 % 23 132/78 No Pain -- AC   10/11/24 1145 -- 148 96 % 24 122/84 No Pain -- AC   10/11/24 1136 -- 140 95 % 23 110/70 No Pain -- AC   10/11/24 1128 -- -- -- -- 118/80 -- -- AC   10/11/24 1124 -- 154 96 % 21 108/82 -- -- AC   10/11/24 1100 98.2 °F (36.8 °C) 158 96 % 24 151/100 No Pain -- AC            Physical Exam  Vitals and nursing note reviewed.   Constitutional:       General: He is in acute distress.      Appearance: Normal appearance. He is not ill-appearing, toxic-appearing or diaphoretic.      Comments: Well-appearing, nontoxic with nondistressed   HENT:      Head: Normocephalic and atraumatic.      Comments: Moist mucous membranes     Right Ear: External ear normal.      Left Ear: External ear normal.   Eyes:      General:         Right eye: No discharge.         Left eye: No discharge.   Cardiovascular:      Comments: Atrial fibrillation with rapid ventricular response rate of approximately 160.  No murmurs rubs or gallops.  Extremities warm and well-perfused without mottling.  Bilateral pitting lower extremity edema  Pulmonary:      Effort: No respiratory distress.      Breath sounds: Rales present.      Comments: No increased work of breathing.  Speaking in complete sentences.  Bilateral rales left more prominent than right.  No wheezes or rhonchi.  Satting 95% on room air indicating adequate oxygenation  Abdominal:      General: There is no distension.      Comments: Abdomen soft, nontender, nondistended without rigidity, rebound, guarding   Musculoskeletal:         General: No deformity.      Cervical back: Normal range of motion.   Skin:     Findings: No lesion or  rash.   Neurological:      Mental Status: He is alert and oriented to person, place, and time. Mental status is at baseline.      Comments: Awake, alert, pleasant, interactive   Psychiatric:         Mood and Affect: Mood and affect normal.         Results Reviewed       Procedure Component Value Units Date/Time    TSH, 3rd generation [[id number]] Collected: 10/11/24 1113    Lab Status: In process Specimen: Blood from Arm, Left Updated: 10/11/24 1439    HS Troponin I 2hr [[id number]]  (Normal) Collected: 10/11/24 1306    Lab Status: Final result Specimen: Blood from Hand, Left Updated: 10/11/24 1359     hs TnI 2hr 9 ng/L      Delta 2hr hsTnI 0 ng/L     HS Troponin I 4hr [[id number]]     Lab Status: No result Specimen: Blood     HS Troponin 0hr (reflex protocol) [[id number]]  (Normal) Collected: 10/11/24 1113    Lab Status: Final result Specimen: Blood from Arm, Left Updated: 10/11/24 1142     hs TnI 0hr 9 ng/L     Comprehensive metabolic panel [[id number]]  (Abnormal) Collected: 10/11/24 1113    Lab Status: Final result Specimen: Blood from Arm, Left Updated: 10/11/24 1141     Sodium 142 mmol/L      Potassium 3.8 mmol/L      Chloride 109 mmol/L      CO2 24 mmol/L      ANION GAP 9 mmol/L      BUN 25 mg/dL      Creatinine 1.14 mg/dL      Glucose 89 mg/dL      Calcium 9.3 mg/dL      AST 19 U/L      ALT 26 U/L      Alkaline Phosphatase 41 U/L      Total Protein 6.2 g/dL      Albumin 3.9 g/dL      Total Bilirubin 0.84 mg/dL      eGFR 63 ml/min/1.73sq m     Narrative:      [employer] guidelines for Chronic Kidney Disease (CKD):     Stage 1 with normal or high GFR (GFR > 90 mL/min/1.73 square meters)    Stage 2 Mild CKD (GFR = 60-89 mL/min/1.73 square meters)    Stage 3A Moderate CKD (GFR = 45-59 mL/min/1.73 square meters)    Stage 3B Moderate CKD (GFR = 30-44 mL/min/1.73 square meters)    Stage 4 Severe CKD (GFR = 15-29 mL/min/1.73 square meters)    Stage 5 End Stage CKD (GFR <15 mL/min/1.73 square  meters)  Note: GFR calculation is accurate only with a steady state creatinine    B-Type Natriuretic Peptide(BNP) [[id number]]  (Abnormal) Collected: 10/11/24 1113    Lab Status: Final result Specimen: Blood from Arm, Left Updated: 10/11/24 1140      pg/mL     CBC and differential [[id number]]  (Abnormal) Collected: 10/11/24 1113    Lab Status: Final result Specimen: Blood from Arm, Left Updated: 10/11/24 1118     WBC 11.12 Thousand/uL      RBC 4.73 Million/uL      Hemoglobin 15.1 g/dL      Hematocrit 47.3 %       fL      MCH 31.9 pg      MCHC 31.9 g/dL      RDW 14.6 %      MPV 9.0 fL      Platelets 188 Thousands/uL      nRBC 0 /100 WBCs      Segmented % 72 %      Immature Grans % 1 %      Lymphocytes % 20 %      Monocytes % 7 %      Eosinophils Relative 0 %      Basophils Relative 0 %      Absolute Neutrophils 8.02 Thousands/µL      Absolute Immature Grans 0.08 Thousand/uL      Absolute Lymphocytes 2.17 Thousands/µL      Absolute Monocytes 0.79 Thousand/µL      Eosinophils Absolute 0.03 Thousand/µL      Basophils Absolute 0.03 Thousands/µL             XR chest 1 view portable   Final Interpretation by Jordy Patton MD (10/11 1304)      No acute cardiopulmonary disease.            Workstation performed: [id number]             CriticalCare Time    Date/Time: 10/11/2024 2:08 PM    Performed by: Moi Lewis MD  Authorized by: Moi Lewis MD    Critical care provider statement:     Critical care time (minutes):  45    Critical care time was exclusive of:  Separately billable procedures and treating other patients and teaching time    Critical care was necessary to treat or prevent imminent or life-threatening deterioration of the following conditions:  Cardiac failure    Critical care was time spent personally by me on the following activities:  Blood draw for specimens, interpretation of cardiac output measurements, ordering and performing treatments and interventions, ordering and review of  laboratory studies, obtaining history from patient or surrogate, development of treatment plan with patient or surrogate, discussions with consultants, evaluation of patient's response to treatment, examination of patient, review of old charts, re-evaluation of patient's condition and ordering and review of radiographic studies  Comments:      Treated with multiple Cardizem boluses and Cardizem drip for atrial fibrillation with rapid ventricular response      ED Medication and Procedure Management   Prior to Admission Medications   Prescriptions Last Dose Informant Patient Reported? Taking?   Calcium Citrate-Vitamin D 250 mg-2.5 mcg tablet  Self Yes No   Sig: Take 1 tablet by mouth 2 (two) times a day   amLODIPine (NORVASC) 5 mg tablet 10/10/2024  No Yes   Sig: Take 1 tablet (5 mg total) by mouth daily   meloxicam (MOBIC) 15 mg tablet   No No   Sig: Take 1 tablet (15 mg total) by mouth daily as needed for moderate pain   metoprolol succinate (TOPROL-XL) 50 mg 24 hr tablet 10/10/2024  No Yes   Sig: Take 1 tablet (50 mg total) by mouth daily      Facility-Administered Medications: None     Current Discharge Medication List        CONTINUE these medications which have NOT CHANGED    Details   amLODIPine (NORVASC) 5 mg tablet Take 1 tablet (5 mg total) by mouth daily  Qty: 90 tablet, Refills: 0    Associated Diagnoses: Essential hypertension      metoprolol succinate (TOPROL-XL) 50 mg 24 hr tablet Take 1 tablet (50 mg total) by mouth daily  Qty: 90 tablet, Refills: 0    Associated Diagnoses: Essential hypertension; PAF (paroxysmal atrial fibrillation) ([facility])      Calcium Citrate-Vitamin D 250 mg-2.5 mcg tablet Take 1 tablet by mouth 2 (two) times a day      meloxicam (MOBIC) 15 mg tablet Take 1 tablet (15 mg total) by mouth daily as needed for moderate pain  Qty: 90 tablet, Refills: 1    Associated Diagnoses: Bilateral shoulder region arthritis           No discharge procedures on file.  ED SEPSIS DOCUMENTATION   Time  reflects when diagnosis was documented in both MDM as applicable and the Disposition within this note       Time User Action Codes Description Comment    10/11/2024  2:05 PM Moi Lewis Add [I48.91] Atrial fibrillation with rapid ventricular response (HCC)                  Moi Lewis MD  10/11/24 1407       Moi Lewis MD  10/11/24 1409       Moi Lewis MD  10/11/24 1440

## 2024-10-11 NOTE — PLAN OF CARE
Problem: PAIN - ADULT  Goal: Verbalizes/displays adequate comfort level or baseline comfort level  Description: Interventions:  - Encourage patient to monitor pain and request assistance  - Assess pain using appropriate pain scale  - Administer analgesics based on type and severity of pain and evaluate response  - Implement non-pharmacological measures as appropriate and evaluate response  - Consider cultural and social influences on pain and pain management  - Notify physician/advanced practitioner if interventions unsuccessful or patient reports new pain  Outcome: Progressing     Problem: INFECTION - ADULT  Goal: Absence or prevention of progression during hospitalization  Description: INTERVENTIONS:  - Assess and monitor for signs and symptoms of infection  - Monitor lab/diagnostic results  - Monitor all insertion sites, i.e. indwelling lines, tubes, and drains  - Monitor endotracheal if appropriate and nasal secretions for changes in amount and color  - [place] appropriate cooling/warming therapies per order  - Administer medications as ordered  - Instruct and encourage patient and family to use good hand hygiene technique  - Identify and instruct in appropriate isolation precautions for identified infection/condition  Outcome: Progressing  Goal: Absence of fever/infection during neutropenic period  Description: INTERVENTIONS:  - Monitor WBC    Outcome: Progressing     Problem: SAFETY ADULT  Goal: Patient will remain free of falls  Description: INTERVENTIONS:  - Educate patient/family on patient safety including physical limitations  - Instruct patient to call for assistance with activity   - Consult OT/PT to assist with strengthening/mobility   - Keep Call bell within reach  - Keep bed low and locked with side rails adjusted as appropriate  - Keep care items and personal belongings within reach  - Initiate and maintain comfort rounds  - Make Fall Risk Sign visible to staff  - Offer Toileting every 2 Hours,  in advance of need  - Initiate/Maintain bed alarm  - Obtain necessary fall risk management equipment: yellow socks  - Apply yellow socks and bracelet for high fall risk patients  - Consider moving patient to room near nurses station  Outcome: Progressing  Goal: Maintain or return to baseline ADL function  Description: INTERVENTIONS:  -  Assess patient's ability to carry out ADLs; assess patient's baseline for ADL function and identify physical deficits which impact ability to perform ADLs (bathing, care of mouth/teeth, toileting, grooming, dressing, etc.)  - Assess/evaluate cause of self-care deficits   - Assess range of motion  - Assess patient's mobility; develop plan if impaired  - Assess patient's need for assistive devices and provide as appropriate  - Encourage maximum independence but intervene and supervise when necessary  - Involve family in performance of ADLs  - Assess for home care needs following discharge   - Consider OT consult to assist with ADL evaluation and planning for discharge  - Provide patient education as appropriate  Outcome: Progressing  Goal: Maintains/Returns to pre admission functional level  Description: INTERVENTIONS:  - Perform AM-PAC 6 Click Basic Mobility/ Daily Activity assessment daily.  - Set and communicate daily mobility goal to care team and patient/family/caregiver.   - Collaborate with rehabilitation services on mobility goals if consulted  - Perform Range of Motion 4 times a day.  - Reposition patient every 2 hours.  - Dangle patient 3 times a day  - Stand patient 3 times a day  - Ambulate patient 3 times a day  - Out of bed to chair 3 times a day   - Out of bed for meals 3 times a day  - Out of bed for toileting  - Record patient progress and toleration of activity level   Outcome: Progressing     Problem: DISCHARGE PLANNING  Goal: Discharge to home or other facility with appropriate resources  Description: INTERVENTIONS:  - Identify barriers to discharge w/patient and  caregiver  - Arrange for needed discharge resources and transportation as appropriate  - Identify discharge learning needs (meds, wound care, etc.)  - Arrange for interpretive services to assist at discharge as needed  - Refer to Case Management Department for coordinating discharge planning if the patient needs post-hospital services based on physician/advanced practitioner order or complex needs related to functional status, cognitive ability, or social support system  Outcome: Progressing     Problem: Knowledge Deficit  Goal: Patient/family/caregiver demonstrates understanding of disease process, treatment plan, medications, and discharge instructions  Description: Complete learning assessment and assess knowledge base.  Interventions:  - Provide teaching at level of understanding  - Provide teaching via preferred learning methods  Outcome: Progressing     Problem: CARDIOVASCULAR - ADULT  Goal: Maintains optimal cardiac output and hemodynamic stability  Description: INTERVENTIONS:  - Monitor I/O, vital signs and rhythm  - Monitor for S/S and trends of decreased cardiac output  - Administer and titrate ordered vasoactive medications to optimize hemodynamic stability  - Assess quality of pulses, skin color and temperature  - Assess for signs of decreased coronary artery perfusion  - Instruct patient to report change in severity of symptoms  Outcome: Progressing  Goal: Absence of cardiac dysrhythmias or at baseline rhythm  Description: INTERVENTIONS:  - Continuous cardiac monitoring, vital signs, obtain 12 lead EKG if ordered  - Administer antiarrhythmic and heart rate control medications as ordered  - Monitor electrolytes and administer replacement therapy as ordered  Outcome: Progressing

## 2024-10-11 NOTE — PLAN OF CARE
Problem: PAIN - ADULT  Goal: Verbalizes/displays adequate comfort level or baseline comfort level  Description: Interventions:  - Encourage patient to monitor pain and request assistance  - Assess pain using appropriate pain scale  - Administer analgesics based on type and severity of pain and evaluate response  - Implement non-pharmacological measures as appropriate and evaluate response  - Consider cultural and social influences on pain and pain management  - Notify physician/advanced practitioner if interventions unsuccessful or patient reports new pain  Outcome: Progressing     Problem: INFECTION - ADULT  Goal: Absence or prevention of progression during hospitalization  Description: INTERVENTIONS:  - Assess and monitor for signs and symptoms of infection  - Monitor lab/diagnostic results  - Monitor all insertion sites, i.e. indwelling lines, tubes, and drains  - Monitor endotracheal if appropriate and nasal secretions for changes in amount and color  - [place] appropriate cooling/warming therapies per order  - Administer medications as ordered  - Instruct and encourage patient and family to use good hand hygiene technique  - Identify and instruct in appropriate isolation precautions for identified infection/condition  Outcome: Progressing  Goal: Absence of fever/infection during neutropenic period  Description: INTERVENTIONS:  - Monitor WBC    Outcome: Progressing     Problem: SAFETY ADULT  Goal: Patient will remain free of falls  Description: INTERVENTIONS:  - Educate patient/family on patient safety including physical limitations  - Instruct patient to call for assistance with activity   - Consult OT/PT to assist with strengthening/mobility   - Keep Call bell within reach  - Keep bed low and locked with side rails adjusted as appropriate  - Keep care items and personal belongings within reach  - Initiate and maintain comfort rounds  - Make Fall Risk Sign visible to staff  - Offer Toileting every 2 Hours,  in advance of need  - Initiate/Maintain bed alarm  - Obtain necessary fall risk management equipment: socks   - Apply yellow socks and bracelet for high fall risk patients  - Consider moving patient to room near nurses station  Outcome: Progressing  Goal: Maintain or return to baseline ADL function  Description: INTERVENTIONS:  -  Assess patient's ability to carry out ADLs; assess patient's baseline for ADL function and identify physical deficits which impact ability to perform ADLs (bathing, care of mouth/teeth, toileting, grooming, dressing, etc.)  - Assess/evaluate cause of self-care deficits   - Assess range of motion  - Assess patient's mobility; develop plan if impaired  - Assess patient's need for assistive devices and provide as appropriate  - Encourage maximum independence but intervene and supervise when necessary  - Involve family in performance of ADLs  - Assess for home care needs following discharge   - Consider OT consult to assist with ADL evaluation and planning for discharge  - Provide patient education as appropriate  Outcome: Progressing  Goal: Maintains/Returns to pre admission functional level  Description: INTERVENTIONS:  - Perform AM-PAC 6 Click Basic Mobility/ Daily Activity assessment daily.  - Set and communicate daily mobility goal to care team and patient/family/caregiver.   - Collaborate with rehabilitation services on mobility goals if consulted  - Perform Range of Motion 3 times a day.  - Reposition patient every 2 hours.  - Dangle patient 3 times a day  - Stand patient 3 times a day  - Ambulate patient 3 times a day  - Out of bed to chair 3 times a day   - Out of bed for meals 3 times a day  - Out of bed for toileting  - Record patient progress and toleration of activity level   Outcome: Progressing     Problem: DISCHARGE PLANNING  Goal: Discharge to home or other facility with appropriate resources  Description: INTERVENTIONS:  - Identify barriers to discharge w/patient and  caregiver  - Arrange for needed discharge resources and transportation as appropriate  - Identify discharge learning needs (meds, wound care, etc.)  - Arrange for interpretive services to assist at discharge as needed  - Refer to Case Management Department for coordinating discharge planning if the patient needs post-hospital services based on physician/advanced practitioner order or complex needs related to functional status, cognitive ability, or social support system  Outcome: Progressing     Problem: Knowledge Deficit  Goal: Patient/family/caregiver demonstrates understanding of disease process, treatment plan, medications, and discharge instructions  Description: Complete learning assessment and assess knowledge base.  Interventions:  - Provide teaching at level of understanding  - Provide teaching via preferred learning methods  Outcome: Progressing     Problem: CARDIOVASCULAR - ADULT  Goal: Maintains optimal cardiac output and hemodynamic stability  Description: INTERVENTIONS:  - Monitor I/O, vital signs and rhythm  - Monitor for S/S and trends of decreased cardiac output  - Administer and titrate ordered vasoactive medications to optimize hemodynamic stability  - Assess quality of pulses, skin color and temperature  - Assess for signs of decreased coronary artery perfusion  - Instruct patient to report change in severity of symptoms  Outcome: Progressing  Goal: Absence of cardiac dysrhythmias or at baseline rhythm  Description: INTERVENTIONS:  - Continuous cardiac monitoring, vital signs, obtain 12 lead EKG if ordered  - Administer antiarrhythmic and heart rate control medications as ordered  - Monitor electrolytes and administer replacement therapy as ordered  Outcome: Progressing

## 2024-10-12 ENCOUNTER — APPOINTMENT (INPATIENT)
Dept: NON INVASIVE DIAGNOSTICS | Facility: HOSPITAL | Age: 74
DRG: 291 | End: 2024-10-12
Payer: COMMERCIAL

## 2024-10-12 LAB
ALBUMIN SERPL BCG-MCNC: 3.4 G/DL (ref 3.5–5)
ALP SERPL-CCNC: 37 U/L (ref 34–104)
ALT SERPL W P-5'-P-CCNC: 20 U/L (ref 7–52)
ANION GAP SERPL CALCULATED.3IONS-SCNC: 8 MMOL/L (ref 4–13)
AORTIC ROOT: 3.5 CM
AST SERPL W P-5'-P-CCNC: 14 U/L (ref 13–39)
AV LVOT PEAK GRADIENT: 5 MMHG
AV PEAK GRADIENT: 9 MMHG
BILIRUB SERPL-MCNC: 0.91 MG/DL (ref 0.2–1)
BSA FOR ECHO PROCEDURE: 1.85 M2
BUN SERPL-MCNC: 24 MG/DL (ref 5–25)
CALCIUM ALBUM COR SERPL-MCNC: 8.9 MG/DL (ref 8.3–10.1)
CALCIUM SERPL-MCNC: 8.4 MG/DL (ref 8.4–10.2)
CHLORIDE SERPL-SCNC: 107 MMOL/L (ref 96–108)
CO2 SERPL-SCNC: 25 MMOL/L (ref 21–32)
CREAT SERPL-MCNC: 1.08 MG/DL (ref 0.6–1.3)
DIGOXIN SERPL-MCNC: 1.2 NG/ML (ref 0.8–2)
DOP CALC LVOT AREA: 3.8 CM2
DOP CALC LVOT DIAMETER: 2.2 CM
ERYTHROCYTE [DISTWIDTH] IN BLOOD BY AUTOMATED COUNT: 14.5 % (ref 11.6–15.1)
FRACTIONAL SHORTENING: 24 (ref 28–44)
GFR SERPL CREATININE-BSD FRML MDRD: 67 ML/MIN/1.73SQ M
GLUCOSE SERPL-MCNC: 98 MG/DL (ref 65–140)
HCT VFR BLD AUTO: 43.6 % (ref 36.5–49.3)
HGB BLD-MCNC: 14.2 G/DL (ref 12–17)
INTERVENTRICULAR SEPTUM IN DIASTOLE (PARASTERNAL SHORT AXIS VIEW): 1.1 CM
INTERVENTRICULAR SEPTUM: 1.1 CM (ref 0.6–1.1)
LAAS-AP2: 23.6 CM2
LAAS-AP4: 22.6 CM2
LEFT ATRIUM AREA SYSTOLE SINGLE PLANE A4C: 25 CM2
LEFT ATRIUM SIZE: 4.3 CM
LEFT ATRIUM VOLUME (MOD BIPLANE): 74 ML
LEFT ATRIUM VOLUME INDEX (MOD BIPLANE): 40 ML/M2
LEFT INTERNAL DIMENSION IN SYSTOLE: 3.1 CM (ref 2.1–4)
LEFT VENTRICULAR INTERNAL DIMENSION IN DIASTOLE: 4.1 CM (ref 3.5–6)
LEFT VENTRICULAR POSTERIOR WALL IN END DIASTOLE: 1.1 CM
LEFT VENTRICULAR STROKE VOLUME: 36 ML
LVSV (TEICH): 36 ML
MAGNESIUM SERPL-MCNC: 2.2 MG/DL (ref 1.9–2.7)
MCH RBC QN AUTO: 32.2 PG (ref 26.8–34.3)
MCHC RBC AUTO-ENTMCNC: 32.6 G/DL (ref 31.4–37.4)
MCV RBC AUTO: 99 FL (ref 82–98)
MV E'TISSUE VEL-LAT: 16 CM/S
MV E'TISSUE VEL-SEP: 10 CM/S
PLATELET # BLD AUTO: 162 THOUSANDS/UL (ref 149–390)
PMV BLD AUTO: 9 FL (ref 8.9–12.7)
POTASSIUM SERPL-SCNC: 3.5 MMOL/L (ref 3.5–5.3)
PROT SERPL-MCNC: 5.4 G/DL (ref 6.4–8.4)
PV PEAK GRADIENT: 4 MMHG
RBC # BLD AUTO: 4.41 MILLION/UL (ref 3.88–5.62)
RIGHT ATRIUM AREA SYSTOLE A4C: 18.9 CM2
RIGHT VENTRICLE ID DIMENSION: 3.4 CM
SL CV LEFT ATRIUM LENGTH A2C: 6 CM
SL CV LV EF: 55
SL CV PED ECHO LEFT VENTRICLE DIASTOLIC VOLUME (MOD BIPLANE) 2D: 75 ML
SL CV PED ECHO LEFT VENTRICLE SYSTOLIC VOLUME (MOD BIPLANE) 2D: 39 ML
SODIUM SERPL-SCNC: 140 MMOL/L (ref 135–147)
TR MAX PG: 38 MMHG
TR PEAK VELOCITY: 3.1 M/S
TRICUSPID ANNULAR PLANE SYSTOLIC EXCURSION: 1.6 CM
TRICUSPID VALVE PEAK REGURGITATION VELOCITY: 3.09 M/S
WBC # BLD AUTO: 8.34 THOUSAND/UL (ref 4.31–10.16)

## 2024-10-12 PROCEDURE — 85027 COMPLETE CBC AUTOMATED: CPT | Performed by: INTERNAL MEDICINE

## 2024-10-12 PROCEDURE — 99232 SBSQ HOSP IP/OBS MODERATE 35: CPT | Performed by: INTERNAL MEDICINE

## 2024-10-12 PROCEDURE — 80053 COMPREHEN METABOLIC PANEL: CPT | Performed by: INTERNAL MEDICINE

## 2024-10-12 PROCEDURE — 80162 ASSAY OF DIGOXIN TOTAL: CPT | Performed by: INTERNAL MEDICINE

## 2024-10-12 PROCEDURE — 93306 TTE W/DOPPLER COMPLETE: CPT | Performed by: INTERNAL MEDICINE

## 2024-10-12 PROCEDURE — 93306 TTE W/DOPPLER COMPLETE: CPT

## 2024-10-12 RX ORDER — METOPROLOL TARTRATE 25 MG/1
25 TABLET, FILM COATED ORAL EVERY 12 HOURS SCHEDULED
Status: DISCONTINUED | OUTPATIENT
Start: 2024-10-12 | End: 2024-10-12

## 2024-10-12 RX ORDER — POTASSIUM CHLORIDE 1500 MG/1
40 TABLET, EXTENDED RELEASE ORAL ONCE
Status: COMPLETED | OUTPATIENT
Start: 2024-10-12 | End: 2024-10-12

## 2024-10-12 RX ORDER — METOPROLOL TARTRATE 25 MG/1
25 TABLET, FILM COATED ORAL 3 TIMES DAILY
Status: DISCONTINUED | OUTPATIENT
Start: 2024-10-12 | End: 2024-10-14

## 2024-10-12 RX ORDER — DIGOXIN 0.25 MG/ML
125 INJECTION INTRAMUSCULAR; INTRAVENOUS ONCE
Status: COMPLETED | OUTPATIENT
Start: 2024-10-12 | End: 2024-10-12

## 2024-10-12 RX ADMIN — ACETAMINOPHEN 650 MG: 325 TABLET ORAL at 04:34

## 2024-10-12 RX ADMIN — ATORVASTATIN CALCIUM 20 MG: 20 TABLET, FILM COATED ORAL at 16:28

## 2024-10-12 RX ADMIN — Medication 12.5 MG: at 08:31

## 2024-10-12 RX ADMIN — DILTIAZEM HYDROCHLORIDE 15 MG/HR: 5 INJECTION, SOLUTION INTRAVENOUS at 20:21

## 2024-10-12 RX ADMIN — ENOXAPARIN SODIUM 80 MG: 80 INJECTION SUBCUTANEOUS at 08:31

## 2024-10-12 RX ADMIN — METOPROLOL TARTRATE 25 MG: 25 TABLET, FILM COATED ORAL at 16:28

## 2024-10-12 RX ADMIN — Medication 12.5 MG: at 09:29

## 2024-10-12 RX ADMIN — METOPROLOL TARTRATE 25 MG: 25 TABLET, FILM COATED ORAL at 22:49

## 2024-10-12 RX ADMIN — DILTIAZEM HYDROCHLORIDE 15 MG/HR: 5 INJECTION, SOLUTION INTRAVENOUS at 12:11

## 2024-10-12 RX ADMIN — ENOXAPARIN SODIUM 80 MG: 80 INJECTION SUBCUTANEOUS at 21:30

## 2024-10-12 RX ADMIN — POTASSIUM CHLORIDE 40 MEQ: 1500 TABLET, EXTENDED RELEASE ORAL at 13:32

## 2024-10-12 RX ADMIN — DIGOXIN 125 MCG: 0.25 INJECTION INTRAMUSCULAR; INTRAVENOUS at 12:13

## 2024-10-12 RX ADMIN — DILTIAZEM HYDROCHLORIDE 15 MG/HR: 5 INJECTION, SOLUTION INTRAVENOUS at 03:45

## 2024-10-12 NOTE — ASSESSMENT & PLAN NOTE
Patient with documented history of paroxysmal atrial fibrillation in June 2022 while hospitalized for COVID-19.  10/11/24 EKG: Atrial fibrillation with rapid ventricular response, 167 bpm.  RBBB.  Patient remains in atrial fibrillation with RVR.  Currently on Lopressor 12.5 mg twice daily.  Will increase to Lopressor 25 mg twice daily for better heart rate control.  Currently on Cardizem drip.  Received 2 doses of digoxin 250 mcg on 10/11/24.   Will order additional dose of digoxin 125 mcg on 10/12/24.   [id number] stroke risk score: 2 points, moderate to high risk.  Continue therapeutic Lovenox.  12/23/22 TTE: LVEF 60%.  Diastolic function normal for age.  Mitral valve with trace regurgitation. Tricuspid valve trace regurgitation.  Follow-up TTE.   If patient fails to convert with pharmacological intervention will plan for cardioversion likely on 10/14/24.   Patient will require ischemic workup during hospitalization. Will plan for nuclear stress test on 10/14/24.   Overnight pulse oximetry ordered for 10/12/24 to rule out obstructive sleep apnea.

## 2024-10-12 NOTE — ASSESSMENT & PLAN NOTE
BP currently acceptable.   Currently on Lopressor 12.5 mg twice daily.  Will increase to Lopressor 25 mg twice daily for better heart rate control.  Currently on Cardizem drip in setting of atrial fibrillation with RVR.  Continue to monitor BP.

## 2024-10-12 NOTE — ASSESSMENT & PLAN NOTE
Lab Results   Component Value Date    HGBA1C 5.9 (H) 10/11/2024     Results from last 7 days   Lab Units 10/12/24  0433 10/11/24  1113   GLUCOSE RANDOM mg/dL 98 89

## 2024-10-12 NOTE — ASSESSMENT & PLAN NOTE
6/26/24 lipid panel: Cholesterol 233, triglycerides 192, HDL 74, .  ASCVD 10-year risk score 21.8%, high risk.  Continue Lipitor 20 mg daily.

## 2024-10-12 NOTE — PROGRESS NOTES
Progress Note - Cardiology   [facility]     Regino Huffman 74 y.o. male MRN: [id number]  : 1950  Unit/Bed#: [address] [place] [address]01 Encounter: [id number]    Assessment and Plan:     Assessment & Plan  Atrial fibrillation with RVR (HCC)  Patient with documented history of paroxysmal atrial fibrillation in 2022 while hospitalized for COVID-19.  10/11/24 EKG: Atrial fibrillation with rapid ventricular response, 167 bpm.  RBBB.  Patient remains in atrial fibrillation with RVR.  Currently on Lopressor 12.5 mg twice daily.  Will increase to Lopressor 25 mg twice daily for better heart rate control.  Currently on Cardizem drip.  Received 2 doses of digoxin 250 mcg on 10/11/24.   Will order additional dose of digoxin 125 mcg on 10/12/24.   [id number] stroke risk score: 2 points, moderate to high risk.  Continue therapeutic Lovenox.  22 TTE: LVEF 60%.  Diastolic function normal for age.  Mitral valve with trace regurgitation. Tricuspid valve trace regurgitation.  Follow-up TTE.   If patient fails to convert with pharmacological intervention will plan for cardioversion likely on 10/14/24.   Patient will require ischemic workup during hospitalization. Will plan for nuclear stress test on 10/14/24.   Overnight pulse oximetry ordered for 10/12/24 to rule out obstructive sleep apnea.  Elevated brain natriuretic peptide (BNP) level  10/11/24 BNP: 940.   Patient with poor air entry on physical exam and trace bilateral lower extremity edema.  10/11/24 CXR: No acute cardiopulmonary disease.   Received one-time dose of Lasix 40 mg IV on 10/11/24.   Order another dose of Lasix 40 mg IV on 10/12/24.   Follow-up TTE.  Monitor I/Os.   Hypertension  BP currently acceptable.   Currently on Lopressor 12.5 mg twice daily.  Will increase to Lopressor 25 mg twice daily for better heart rate control.  Currently on Cardizem drip in setting of atrial fibrillation with RVR.  Continue to monitor  "BP.  Hyperlipidemia  6/26/24 lipid panel: Cholesterol 233, triglycerides 192, HDL 74, .  ASCVD 10-year risk score 21.8%, high risk.  Continue Lipitor 20 mg daily.  RBBB  Chronic, noted on EKG since 2022.  Pre-diabetes  10/11/24 HgbA1c: 5.9.   Care per primary team.     Subjective / Objective:         Patient remains in atrial fibrillation with RVR.  Patient reports he is overall feeling well.  Patient denies experiencing chest pain, palpitations, shortness of breath, lightheadedness, dizziness, headache and nausea, vomiting.    Vitals: Blood pressure 136/90, pulse 66, temperature 97.6 °F (36.4 °C), resp. rate 16, height 5' 6\" (1.676 m), weight 75.7 kg (166 lb 12.8 oz), SpO2 94%.  Vitals:    10/11/24 1142 10/11/24 1502   Weight: 82 kg (180 lb 12.4 oz) 75.7 kg (166 lb 12.8 oz)     Body mass index is 26.92 kg/m².  BP Readings from Last 3 Encounters:   10/12/24 136/90   09/23/24 136/88   07/08/24 136/74     Orthostatic Blood Pressures      Flowsheet Row Most Recent Value   Blood Pressure 136/90 filed at 10/12/2024 0731   Patient Position - Orthostatic VS Lying filed at 10/11/2024 1215          I/O         10/10 0701  10/11 0700 10/11 0701  10/12 0700 10/12 0701  10/13 0700    Urine (mL/kg/hr)  1475     Total Output  1475     Net  -1475                  Invasive Devices       Peripheral Intravenous Line  Duration             Peripheral IV 10/11/24 Left Antecubital <1 day    Peripheral IV 10/11/24 Left Wrist <1 day                      Intake/Output Summary (Last 24 hours) at 10/12/2024 0909  Last data filed at 10/12/2024 0433  Gross per 24 hour   Intake --   Output 1475 ml   Net -1475 ml         Physical Exam:   Physical Exam  Vitals reviewed.   Constitutional:       General: He is not in acute distress.  Cardiovascular:      Rate and Rhythm: Tachycardia present. Rhythm irregular.      Pulses: Normal pulses.      Heart sounds: Murmur heard.   Pulmonary:      Effort: Pulmonary effort is normal. No respiratory " distress.      Breath sounds: Normal breath sounds.   Abdominal:      General: Abdomen is flat. There is no distension.      Palpations: Abdomen is soft.      Tenderness: There is no abdominal tenderness.   Musculoskeletal:      Right lower leg: Edema present.      Left lower leg: Edema present.   Skin:     General: Skin is warm and dry.   Neurological:      Mental Status: He is alert and oriented to person, place, and time.       Medications/ Allergies:     Current Facility-Administered Medications   Medication Dose Route Frequency Provider Last Rate    acetaminophen  650 mg Oral Q4H PRN Lewis Srivastava, DO      atorvastatin  20 mg Oral Daily With Dinner Marisol Price PA-C      diltiazem  1-15 mg/hr Intravenous Titrated Lewis Srivastava, DO 15 mg/hr (10/12/24 0345)    enoxaparin  1 mg/kg Subcutaneous Q12H [facility] Marisol Price PA-C      metoprolol tartrate  12.5 mg Oral Q12H [facility] Marisol Price PA-C      ondansetron  4 mg Intravenous Q4H PRN Lewis Atif, DO       acetaminophen, 650 mg, Q4H PRN  ondansetron, 4 mg, Q4H PRN      No Known Allergies    VTE Pharmacologic Prophylaxis:   Enoxaparin (Lovenox)    Labs:   Troponins:  Results from last 7 days   Lab Units 10/11/24  1306   HSTNI D2 ng/L 0         CBC with diff:  Results from last 7 days   Lab Units 10/12/24  0433 10/11/24  1113   WBC Thousand/uL 8.34 11.12*   HEMOGLOBIN g/dL 14.2 15.1   HEMATOCRIT % 43.6 47.3   MCV fL 99* 100*   PLATELETS Thousands/uL 162 188   RBC Million/uL 4.41 4.73   MCH pg 32.2 31.9   MCHC g/dL 32.6 31.9   RDW % 14.5 14.6   MPV fL 9.0 9.0   NRBC AUTO /100 WBCs  --  0       CMP:  Results from last 7 days   Lab Units 10/12/24  0433 10/11/24  1113   SODIUM mmol/L 140 142   POTASSIUM mmol/L 3.5 3.8   CHLORIDE mmol/L 107 109*   CO2 mmol/L 25 24   ANION GAP mmol/L 8 9   BUN mg/dL 24 25   CREATININE mg/dL 1.08 1.14   CALCIUM mg/dL 8.4 9.3   AST U/L 14 19   ALT U/L 20 26   ALK PHOS U/L 37 41   TOTAL PROTEIN g/dL 5.4* 6.2*   ALBUMIN g/dL 3.4* 3.9   TOTAL  BILIRUBIN mg/dL 0.91 0.84   EGFR ml/min/1.73sq m 67 63       Magnesium:  Results from last 7 days   Lab Units 10/11/24  1113   MAGNESIUM mg/dL 2.2     TSH:  Results from last 7 days   Lab Units 10/11/24  1113   TSH 3RD GENERATON uIU/mL 0.523     Hgb A1c:  Results from last 7 days   Lab Units 10/11/24  1113   HEMOGLOBIN A1C % 5.9*       Imaging & Testing   I have personally reviewed pertinent reports.    XR chest 1 view portable    Result Date: 10/11/2024    Impression: No acute cardiopulmonary disease.      EKG / Monitor: Personally reviewed.      Telemetry reviewed: Currently atrial fibrillation with rapid ventricular response, ventricular 132 bpm.    Cardiac testing:   Exercise stress test  Result date: 8/24/23       The stress ECG is negative for ischemia after maximal exercise without chest pain but with significant fatigue requiring stopping the treadmill after 1 minute and 31 seconds.  Patient's exercise capacity is severely diminished.  Because of baseline RBBB and decreased exercise capacity, specificity of the study is limited.  If clinically indicated, a pharmacologic nuclear stress test may be performed.         Resting ECG     Resting ECG ECG is abnormal. The ECG shows normal sinus rhythm. There are nonspecific ST and T changes. ECG demonstrates right bundle branch block.   Stress Findings     Stress Findings A [place] protocol stress test was performed. Lungs, no wheezing auscultated. The patient reached stage 1.0 of the protocol after exercising for 1 min and 31 sec and had a maximal HR of 137 bpm (93 % of MPHR) and 4.6 METS. The patient experienced no angina during the test. The test was stopped because the patient experienced fatigue and dyspnea. The patient reported dyspnea and fatigue during the stress test. Onset of symptoms occurred at stage 1 of the protocol. Symptoms ended during recovery. Blood pressure demonstrated a normal response and heart rate demonstrated a normal response to stress. The  patient's heart rate recovery was normal.   Stress ECG     Stress ECG No significant ST segment changes from baseline There were no arrhythmias during stress. There were no arrhythmias during recovery. . The ECG was negative for ischemia. The stress ECG is negative for ischemia after maximal exercise without chest pain but with significant fatigue requiring stopping the treadmill after 1 minute and 31 seconds.  Patient's exercise capacity is severely diminished.  Because of baseline RBBB and decreased exercise capacity, specificity of the study is limited.  If clinically indicated, a pharmacologic nuclear stress test may be performed.         Echo   Result date: 12/23/22           Left Ventricle Left ventricular cavity size is normal. Wall thickness is normal. The left ventricular ejection fraction is 60% by visual estimation. Systolic function is normal.  Wall motion is normal. Diastolic function is normal for age.  Left atrial filling pressure is normal.   Right Ventricle Right ventricular cavity size is normal. Systolic function is normal. Wall thickness is normal.   Left Atrium The atrium is normal in size (16-34 mL/m2).   Right Atrium The atrium is normal in size.   Aortic Valve The aortic valve is trileaflet. The leaflets are not thickened. The leaflets are moderately calcified. There is mildly reduced mobility. There is no evidence of regurgitation. The aortic valve has no significant stenosis.   Mitral Valve Mitral valve structure is normal. There is trace regurgitation. There is no evidence of stenosis.   Tricuspid Valve Tricuspid valve structure is normal. There is trace regurgitation. There is no evidence of stenosis. The right ventricular systolic pressure is normal. The estimated right ventricular systolic pressure is 22.00 mmHg.   Pulmonic Valve Pulmonic valve structure is normal. There is trace regurgitation. There is no evidence of stenosis.   Ascending Aorta The aortic root is normal in size.    IVC/SVC The right atrial pressure is estimated at 8.0 mmHg. The inferior vena cava is normal in size.   Pericardium There is no pericardial effusion. The pericardium is normal in appearance.                   Marisol Price PA-C

## 2024-10-12 NOTE — ASSESSMENT & PLAN NOTE
History of paroxysmal atrial fibrillation while hospitalized for COVID, hypertension, hyperlipidemia who presented to the hospital for worsening shortness of breath.    Seen by cardiology.  Remains tachycardic on diltiazem infusion.  Metoprolol added and increased to 25 mg twice daily today  Loaded with digoxin yesterday.  Anticoagulation: Weight-based enoxaparin ordered.  Follow-up on echocardiogram.  Cardiology planning ischemic workup/stress testing

## 2024-10-12 NOTE — PLAN OF CARE
Problem: PAIN - ADULT  Goal: Verbalizes/displays adequate comfort level or baseline comfort level  Description: Interventions:  - Encourage patient to monitor pain and request assistance  - Assess pain using appropriate pain scale  - Administer analgesics based on type and severity of pain and evaluate response  - Implement non-pharmacological measures as appropriate and evaluate response  - Consider cultural and social influences on pain and pain management  - Notify physician/advanced practitioner if interventions unsuccessful or patient reports new pain  Outcome: Progressing     Problem: INFECTION - ADULT  Goal: Absence or prevention of progression during hospitalization  Description: INTERVENTIONS:  - Assess and monitor for signs and symptoms of infection  - Monitor lab/diagnostic results  - Monitor all insertion sites, i.e. indwelling lines, tubes, and drains  - Monitor endotracheal if appropriate and nasal secretions for changes in amount and color  - [place] appropriate cooling/warming therapies per order  - Administer medications as ordered  - Instruct and encourage patient and family to use good hand hygiene technique  - Identify and instruct in appropriate isolation precautions for identified infection/condition  Outcome: Progressing  Goal: Absence of fever/infection during neutropenic period  Description: INTERVENTIONS:  - Monitor WBC    Outcome: Progressing       Goal: Maintain or return to baseline ADL function  Description: INTERVENTIONS:  -  Assess patient's ability to carry out ADLs; assess patient's baseline for ADL function and identify physical deficits which impact ability to perform ADLs (bathing, care of mouth/teeth, toileting, grooming, dressing, etc.)  - Assess/evaluate cause of self-care deficits   - Assess range of motion  - Assess patient's mobility; develop plan if impaired  - Assess patient's need for assistive devices and provide as appropriate  - Encourage maximum independence but  intervene and supervise when necessary  - Involve family in performance of ADLs  - Assess for home care needs following discharge   - Consider OT consult to assist with ADL evaluation and planning for discharge  - Provide patient education as appropriate  Outcome: Progressing  Goal: Maintains/Returns to pre admission functional level  Description: INTERVENTIONS:  - Perform AM-PAC 6 Click Basic Mobility/ Daily Activity assessment daily.  - Set and communicate daily mobility goal to care team and patient/family/caregiver.   - Collaborate with rehabilitation services on mobility goals if consulted  - Perform Range of Motion 3 times a day.  - Reposition patient every 2 hours.  - Dangle patient 3 times a day  - Stand patient 3 times a day  - Ambulate patient 3 times a day  - Out of bed to chair 3 times a day   - Out of bed for meals 3 times a day  - Out of bed for toileting  - Record patient progress and toleration of activity level   Outcome: Progressing     Problem: Knowledge Deficit  Goal: Patient/family/caregiver demonstrates understanding of disease process, treatment plan, medications, and discharge instructions  Description: Complete learning assessment and assess knowledge base.  Interventions:  - Provide teaching at level of understanding  - Provide teaching via preferred learning methods  Outcome: Progressing     Problem: CARDIOVASCULAR - ADULT  Goal: Maintains optimal cardiac output and hemodynamic stability  Description: INTERVENTIONS:  - Monitor I/O, vital signs and rhythm  - Monitor for S/S and trends of decreased cardiac output  - Administer and titrate ordered vasoactive medications to optimize hemodynamic stability  - Assess quality of pulses, skin color and temperature  - Assess for signs of decreased coronary artery perfusion  - Instruct patient to report change in severity of symptoms  Outcome: Progressing  Goal: Absence of cardiac dysrhythmias or at baseline rhythm  Description: INTERVENTIONS:  -  Continuous cardiac monitoring, vital signs, obtain 12 lead EKG if ordered  - Administer antiarrhythmic and heart rate control medications as ordered  - Monitor electrolytes and administer replacement therapy as ordered  Outcome: Progressing

## 2024-10-12 NOTE — PLAN OF CARE
Problem: PAIN - ADULT  Goal: Verbalizes/displays adequate comfort level or baseline comfort level  Description: Interventions:  - Encourage patient to monitor pain and request assistance  - Assess pain using appropriate pain scale  - Administer analgesics based on type and severity of pain and evaluate response  - Implement non-pharmacological measures as appropriate and evaluate response  - Consider cultural and social influences on pain and pain management  - Notify physician/advanced practitioner if interventions unsuccessful or patient reports new pain  Outcome: Progressing     Problem: INFECTION - ADULT  Goal: Absence or prevention of progression during hospitalization  Description: INTERVENTIONS:  - Assess and monitor for signs and symptoms of infection  - Monitor lab/diagnostic results  - Monitor all insertion sites, i.e. indwelling lines, tubes, and drains  - Monitor endotracheal if appropriate and nasal secretions for changes in amount and color  - [place]atte appropriate cooling/warming therapies per order  - Administer medications as ordered  - Instruct and encourage patient and family to use good hand hygiene technique  - Identify and instruct in appropriate isolation precautions for identified infection/condition  Outcome: Progressing  Goal: Absence of fever/infection during neutropenic period  Description: INTERVENTIONS:  - Monitor WBC    Outcome: Progressing     Problem: SAFETY ADULT  Goal: Patient will remain free of falls  Description: INTERVENTIONS:  - Educate patient/family on patient safety including physical limitations  - Instruct patient to call for assistance with activity   - Consult OT/PT to assist with strengthening/mobility   - Keep Call bell within reach  - Keep bed low and locked with side rails adjusted as appropriate  - Keep care items and personal belongings within reach  - Initiate and maintain comfort rounds  - Make Fall Risk Sign visible to staff  - Offer Toileting every 2 Hours,  in advance of need  - Initiate/Maintain bed alarm  - Obtain necessary fall risk management equipment: yellow socks  - Apply yellow socks and bracelet for high fall risk patients  - Consider moving patient to room near nurses station  Outcome: Progressing  Goal: Maintain or return to baseline ADL function  Description: INTERVENTIONS:  -  Assess patient's ability to carry out ADLs; assess patient's baseline for ADL function and identify physical deficits which impact ability to perform ADLs (bathing, care of mouth/teeth, toileting, grooming, dressing, etc.)  - Assess/evaluate cause of self-care deficits   - Assess range of motion  - Assess patient's mobility; develop plan if impaired  - Assess patient's need for assistive devices and provide as appropriate  - Encourage maximum independence but intervene and supervise when necessary  - Involve family in performance of ADLs  - Assess for home care needs following discharge   - Consider OT consult to assist with ADL evaluation and planning for discharge  - Provide patient education as appropriate  Outcome: Progressing  Goal: Maintains/Returns to pre admission functional level  Description: INTERVENTIONS:  - Perform AM-PAC 6 Click Basic Mobility/ Daily Activity assessment daily.  - Set and communicate daily mobility goal to care team and patient/family/caregiver.   - Collaborate with rehabilitation services on mobility goals if consulted  - Perform Range of Motion 4 times a day.  - Reposition patient every 2 hours.  - Dangle patient 3 times a day  - Stand patient 3 times a day  - Ambulate patient 3 times a day  - Out of bed to chair 3 times a day   - Out of bed for meals 3 times a day  - Out of bed for toileting  - Record patient progress and toleration of activity level   Outcome: Progressing     Problem: DISCHARGE PLANNING  Goal: Discharge to home or other facility with appropriate resources  Description: INTERVENTIONS:  - Identify barriers to discharge w/patient and  caregiver  - Arrange for needed discharge resources and transportation as appropriate  - Identify discharge learning needs (meds, wound care, etc.)  - Arrange for interpretive services to assist at discharge as needed  - Refer to Case Management Department for coordinating discharge planning if the patient needs post-hospital services based on physician/advanced practitioner order or complex needs related to functional status, cognitive ability, or social support system  Outcome: Progressing     Problem: Knowledge Deficit  Goal: Patient/family/caregiver demonstrates understanding of disease process, treatment plan, medications, and discharge instructions  Description: Complete learning assessment and assess knowledge base.  Interventions:  - Provide teaching at level of understanding  - Provide teaching via preferred learning methods  Outcome: Progressing     Problem: CARDIOVASCULAR - ADULT  Goal: Maintains optimal cardiac output and hemodynamic stability  Description: INTERVENTIONS:  - Monitor I/O, vital signs and rhythm  - Monitor for S/S and trends of decreased cardiac output  - Administer and titrate ordered vasoactive medications to optimize hemodynamic stability  - Assess quality of pulses, skin color and temperature  - Assess for signs of decreased coronary artery perfusion  - Instruct patient to report change in severity of symptoms  Outcome: Progressing  Goal: Absence of cardiac dysrhythmias or at baseline rhythm  Description: INTERVENTIONS:  - Continuous cardiac monitoring, vital signs, obtain 12 lead EKG if ordered  - Administer antiarrhythmic and heart rate control medications as ordered  - Monitor electrolytes and administer replacement therapy as ordered  Outcome: Progressing

## 2024-10-12 NOTE — ASSESSMENT & PLAN NOTE
10/11/24 BNP: 940.   Patient with poor air entry on physical exam and trace bilateral lower extremity edema.  10/11/24 CXR: No acute cardiopulmonary disease.   Received one-time dose of Lasix 40 mg IV on 10/11/24.   Order another dose of Lasix 40 mg IV on 10/12/24.   Follow-up TTE.  Monitor I/Os.

## 2024-10-12 NOTE — PROGRESS NOTES
Progress Note - Hospitalist   Name: Regino Huffman 74 y.o. male I MRN: [id number]  Unit/Bed#: 4 [facility] 419-01 I Date of Admission: 10/11/2024   Date of Service: 10/12/2024 I Hospital Day: 1    Assessment & Plan  Atrial fibrillation with RVR (HCC)  History of paroxysmal atrial fibrillation while hospitalized for COVID, hypertension, hyperlipidemia who presented to the hospital for worsening shortness of breath.    Seen by cardiology.  Remains tachycardic on diltiazem infusion.  Metoprolol added and increased to 25 mg twice daily today  Loaded with digoxin yesterday.  Anticoagulation: Weight-based enoxaparin ordered.  Follow-up on echocardiogram.  Cardiology planning ischemic workup/stress testing  Hypertension  Prior to admission on metoprolol and amlodipine.  Holding amlodipine while undergoing rate control of atrial fibrillation  Hyperlipidemia  Started on atorvastatin by cardiology  RBBB  Chronic upon review  Pre-diabetes  Lab Results   Component Value Date    HGBA1C 5.9 (H) 10/11/2024     Results from last 7 days   Lab Units 10/12/24  0433 10/11/24  1113   GLUCOSE RANDOM mg/dL 98 89     Elevated brain natriuretic peptide (BNP) level  Presentation with shortness of breath found to have elevated BNP  Given one-time dose of furosemide.  Follow-up on echocardiogram.    VTE Pharmacologic Prophylaxis: VTE Score: 3 Moderate Risk (Score 3-4) - Pharmacological DVT Prophylaxis Ordered: enoxaparin (Lovenox).    Mobility:   Basic Mobility Inpatient Raw Score: 24  JH-HLM Goal: 8: Walk 250 feet or more  JH-HLM Achieved: 7: Walk 25 feet or more  JH-HLM Goal NOT achieved. Continue with multidisciplinary rounding and encourage appropriate mobility to improve upon JH-HLM goals.    Patient Centered Rounds: I have performed bedside rounds with nursing staff today.  Discussions with Specialists or Other Care Team Provider: Case management cardiology    Education and Discussions with Family / Patient:     Current Length of Stay: 1  day(s)  Current Patient Status: Inpatient   Certification Statement: The patient will continue to require additional inpatient hospital stay due to rapid atrial fibrillation  Discharge Plan: Anticipate discharge in 48-72 hrs to home.    Code Status: Level 1 - Full Code    Subjective   Patient seen and examined.  Still dyspneic with exertion.  No chest pain.    Objective   Vitals:   Temp (24hrs), Av.6 °F (36.4 °C), Min:97.2 °F (36.2 °C), Max:98.2 °F (36.8 °C)    Temp:  [97.2 °F (36.2 °C)-98.2 °F (36.8 °C)] 97.6 °F (36.4 °C)  HR:  [] 82  Resp:  [16-33] 16  BP: ()/() 126/84  SpO2:  [92 %-96 %] 95 %  Body mass index is 26.92 kg/m².     Input and Output Summary (last 24 hours):     Intake/Output Summary (Last 24 hours) at 10/12/2024 1042  Last data filed at 10/12/2024 0901  Gross per 24 hour   Intake --   Output 1675 ml   Net -1675 ml       Physical Exam  Vitals reviewed.   Constitutional:       General: He is not in acute distress.  HENT:      Head: Atraumatic.   Cardiovascular:      Rate and Rhythm: Tachycardia present. Rhythm irregular.      Heart sounds: Normal heart sounds.   Pulmonary:      Effort: Pulmonary effort is normal.      Breath sounds: Decreased breath sounds present. No wheezing.   Abdominal:      General: Bowel sounds are normal.      Palpations: Abdomen is soft.      Tenderness: There is no abdominal tenderness. There is no rebound.   Musculoskeletal:         General: No swelling or tenderness.   Skin:     General: Skin is warm and dry.   Neurological:      General: No focal deficit present.      Mental Status: He is alert and oriented to person, place, and time.      Motor: No weakness.   Psychiatric:         Mood and Affect: Mood normal.       Lines/Drains:    Invasive Devices       Peripheral Intravenous Line  Duration             Peripheral IV 10/11/24 Left Antecubital <1 day    Peripheral IV 10/11/24 Left Wrist <1 day                      Telemetry:  Telemetry Orders (From  admission, onward)               24 Hour Telemetry Monitoring  Continuous x 24 Hours (Telem)        Question:  Reason for 24 Hour Telemetry  Answer:  Arrhythmias requiring acute medical intervention / PPM or ICD malfunction                     Telemetry Reviewed: Atrial fibrillation. HR averaging    Indication for Continued Telemetry Use: Arrthymias requiring medical therapy               Lab Results: I have reviewed the following results:   Results from last 7 days   Lab Units 10/12/24  0433 10/11/24  1113   WBC Thousand/uL 8.34 11.12*   HEMOGLOBIN g/dL 14.2 15.1   PLATELETS Thousands/uL 162 188   MCV fL 99* 100*     Results from last 7 days   Lab Units 10/12/24  0433 10/11/24  1113   SODIUM mmol/L 140 142   POTASSIUM mmol/L 3.5 3.8   CHLORIDE mmol/L 107 109*   CO2 mmol/L 25 24   ANION GAP mmol/L 8 9   BUN mg/dL 24 25   CREATININE mg/dL 1.08 1.14   CALCIUM mg/dL 8.4 9.3   ALBUMIN g/dL 3.4* 3.9   TOTAL BILIRUBIN mg/dL 0.91 0.84   ALK PHOS U/L 37 41   ALT U/L 20 26   AST U/L 14 19   EGFR ml/min/1.73sq m 67 63   GLUCOSE RANDOM mg/dL 98 89     Results from last 7 days   Lab Units 10/11/24  1113   MAGNESIUM mg/dL 2.2         Results from last 7 days   Lab Units 10/11/24  1306 10/11/24  1113   HS TNI 0HR ng/L  --  9   HS TNI 2HR ng/L 9  --                   Results from last 7 days   Lab Units 10/11/24  1113   HEMOGLOBIN A1C % 5.9*     Results from last 7 days   Lab Units 10/11/24  1113   TSH 3RD GENERATON uIU/mL 0.523       Recent Cultures (last 7 days):         Imaging:  Reviewed radiology reports from this admission including:  XR chest 1 view portable    Result Date: 10/11/2024  Impression: No acute cardiopulmonary disease. Workstation performed: [id number]       Last 24 Hours Medication List:     Current Facility-Administered Medications:     acetaminophen (TYLENOL) tablet 650 mg, Q4H PRN    atorvastatin (LIPITOR) tablet 20 mg, Daily With Dinner    diltiazem (CARDIZEM) 125 mg in sodium chloride 0.9 % 125 mL infusion,  Titrated, Last Rate: 15 mg/hr (10/12/24 2005)    enoxaparin (LOVENOX) subcutaneous injection 80 mg, Q12H JOSEF    metoprolol tartrate (LOPRESSOR) tablet 25 mg, Q12H JOSEF    ondansetron (ZOFRAN) injection 4 mg, Q4H PRN    Administrative Statements   Today, Patient Was Seen By: Lewis Srivastava, DO  I have spent a total time of 35 minutes in caring for this patient on the day of the visit/encounter including Diagnostic results, Counseling / Coordination of care, Documenting in the medical record, Reviewing / ordering tests, medicine, procedures  , Obtaining or reviewing history  , and Communicating with other healthcare professionals .    **Please Note: This note may have been constructed using a voice recognition system.**

## 2024-10-12 NOTE — UTILIZATION REVIEW
Initial Clinical Review    Admission: Date/Time/Statement:   Admission Orders (From admission, onward)       Ordered        10/11/24 1406  INPATIENT ADMISSION  Once                          Orders Placed This Encounter   Procedures    INPATIENT ADMISSION     Standing Status:   Standing     Number of Occurrences:   1     Order Specific Question:   Level of Care     Answer:   Level 2 Stepdown / HOT [14]     Order Specific Question:   Estimated length of stay     Answer:   More than 2 Midnights     Order Specific Question:   Certification     Answer:   I certify that inpatient services are medically necessary for this patient for a duration of greater than two midnights. See H&P and MD Progress Notes for additional information about the patient's course of treatment.     ED Arrival Information       Expected   -    Arrival   10/11/2024 10:47    Acuity   Emergent              Means of arrival   Walk-In    Escorted by   Self    Service   Hospitalist    Admission type   Emergency              Arrival complaint   chest pain last night             Chief Complaint   Patient presents with    Shortness of Breath     Patient states yesterday his heart rate was elevated during PT and last night he had SOB while laying flat and had chest pain.  Currently denies feeling SOB or having pain.       Initial Presentation: 74 y.o. male presented to ED as inpatient admission for A fib with RVR. PMH of paroxysmal atrial fibrillation hypertension and dyslipidemia who presented to the hospital with worsening shortness of breath.  In the ED he was found to have rapid atrial fibrillation and was given intravenous metoprolol and diltiazem with only slight improvement. On exam Tachycardia present. Rhythm irregular. Plan consult cardiology IV Crdizem gtt, starting atorvastatin elevated BNP one-time dose of furosemide Metoprolol added., Telemetry, pulse oximetry over night to rule out sleep apnea, and supportive care    Cardiology consult:    Assessment & Plan  Atrial fibrillation with RVR ([facility])  Patient with documented history of paroxysmal atrial fibrillation in June 2022 while hospitalized for COVID-19.  Presented on 10/11/24 for evaluation for shortness of breath, palpitations and intermittent chest discomfort. On presentation patient noted to be in atrial fibrillation with RVR, ventricular rate 167 bpm.  Patient received one-time dose of Cardizem 15 mg followed by an additional dose of Cardizem 120 mg.  Also received 2 doses of Lopressor 5 mg IV.  Patient does have improvement in heart rate.  Was started on a Cardizem drip.   10/11/24 EKG: Atrial fibrillation with rapid ventricular response, 167 bpm.  RBBB.  10/11/24 hs troponin: 9 (0hrs), 9 (2hrs).   Start Lopressor 12.5 mg twice daily.  Currently on Cardizem drip.  If heart rate remains elevated can consider IV digoxin.  [id number] stroke risk score: 2 points, moderate to high risk.  Review of home medications notes that patient is not on anticoagulation, unclear as to why.  Will start therapeutic Lovenox.  12/23/22 TTE: LVEF 60%.  Diastolic function normal for age.  Mitral valve with trace regurgitation. Tricuspid valve trace regurgitation.  Repeat TTE ordered.   Elevated brain natriuretic peptide (BNP) level  10/11/24 BNP: 940.   Patient with poor air entry on physical exam and trace bilateral lower extremity edema.  10/11/24 CXR: No acute cardiopulmonary disease.   Will order one-time dose of Lasix 40 mg IV.      Anticipated Length of Stay/Certification Statement:  Patient will be admitted on an inpatient basis with an anticipated length of stay of greater than 2 midnights secondary to rapid atrial fibrillation.     Date: 10-12-24   Day 2:  Patient remains in atrial fibrillation with RVR. Continues on IV Cardizem gtt increase to Lopressor 25 mg twice daily for better heart rate control.  2 doses of digoxin 250 mcg on 10/11/24.  Will order additional dose of digoxin 125 mcg He had a exercise  stress test done a year ago where he could only walk for a minute. nuclear stress test on 10/14/24. On exam Tachycardia present. Rhythm irregular. (+) murmur b/l lower leg edema noted       Date: 10-13-24  Day 3: Has surpassed a 2nd midnight with active treatments and services. C/o increasing SOB 1 dose IV lasix given,  Converted from atrial fibrillation to sinus rhythm at 0617 hrs. IV Cardizem stopped  Patient reports feeling well. Patient denies experiencing chest pain, palpitations, shortness of breath, lightheadedness, dizziness, headache and nausea, vomiting. nuclear stress test on 10/14/24.     10/13/24 overnight pulse oximetry: AHI 39, suspected pathological breathing disorder.  Respiratory therapist does report that patient woke up multiple times during the study to use the bathroom.  Would recommend formal outpatient sleep study to rule out obstructive sleep apnea.    Intake/Output Summary (Last 24 hours) at 10/13/2024 1022  Last data filed at 10/13/2024 0551      Gross per 24 hour   Intake --   Output 2500 ml   Net -2500 ml         ED Treatment-Medication Administration from 10/11/2024 1047 to 10/11/2024 1448         Date/Time Order Dose Route Action     10/11/2024 1125 diltiazem (CARDIZEM) injection 15 mg 15 mg Intravenous Given     10/11/2024 1202 diltiazem (CARDIZEM) 125 mg in sodium chloride 0.9 % 125 mL infusion 5 mg/hr Intravenous New Bag     10/11/2024 1217 diltiazem (CARDIZEM) 125 mg in sodium chloride 0.9 % 125 mL infusion 7.5 mg/hr Intravenous Rate/Dose Change     10/11/2024 1232 diltiazem (CARDIZEM) 125 mg in sodium chloride 0.9 % 125 mL infusion 10 mg/hr Intravenous Rate/Dose Change     10/11/2024 1247 diltiazem (CARDIZEM) 125 mg in sodium chloride 0.9 % 125 mL infusion 12.5 mg/hr Intravenous Rate/Dose Change     10/11/2024 1303 diltiazem (CARDIZEM) 125 mg in sodium chloride 0.9 % 125 mL infusion 15 mg/hr Intravenous Rate/Dose Change     10/11/2024 1247 diltiazem (CARDIZEM) injection 20 mg 20  mg Intravenous Given     10/11/2024 1330 metoprolol (LOPRESSOR) injection 5 mg 5 mg Intravenous Given     10/11/2024 1424 metoprolol (LOPRESSOR) injection 5 mg 5 mg Intravenous Given            Scheduled Medications:  apixaban, 5 mg, Oral, BID  atorvastatin, 20 mg, Oral, Daily With Dinner  metoprolol tartrate, 25 mg, Oral, TID      Continuous IV Infusions:   diltiazem (CARDIZEM) 125 mg in sodium chloride 0.9 % 125 mL infusion     PRN Meds:  acetaminophen, 650 mg, Oral, Q4H PRN  ondansetron, 4 mg, Intravenous, Q4H PRN      ED Triage Vitals [10/11/24 1100]   Temperature Pulse Respirations Blood Pressure SpO2 Pain Score   98.2 °F (36.8 °C) (!) 158 (!) 24 151/100 96 % No Pain     Weight (last 2 days)       Date/Time Weight    10/12/24 0850 75.3 (166)    10/11/24 15:02:35 75.7 (166.8)    10/11/24 1142 82 (180.78)            Vital Signs (last 3 days)       Date/Time Temp Pulse Resp BP MAP (mmHg) SpO2 O2 Device Patient Position - Orthostatic VS Pain    10/13/24 07:24:57 97.6 °F (36.4 °C) 75 -- 146/95 112 97 % -- -- No Pain    10/13/24 0642 -- 60 -- -- -- 95 % -- -- --    10/13/24 0635 -- 63 -- -- -- 96 % -- -- --    10/13/24 06:19:23 -- 60 -- 118/87 97 95 % -- -- --    10/13/24 0617 -- 50 -- -- -- -- -- -- --    10/13/24 0616 -- 110 -- -- -- -- -- -- --    10/13/24 0615 -- 103 -- -- -- -- -- -- --    10/13/24 0600 -- 120 -- -- -- -- -- -- --    10/13/24 0530 -- 146 -- -- -- -- -- -- --    10/13/24 0500 -- 122 -- -- -- -- -- -- --    10/13/24 0330 -- 98 -- -- -- 97 % -- -- --    10/13/24 0245 -- 98 -- 125/92 103 92 % -- -- --    10/13/24 02:31:57 97.8 °F (36.6 °C) 128 16 125/92 103 93 % -- -- --    10/13/24 01:33:32 -- 101 26 123/91 102 95 % -- -- --    10/13/24 0100 -- 108 -- -- -- 93 % -- -- --    10/12/24 22:37:41 -- 93 16 124/91 102 93 % -- -- --    10/12/24 22:31:47 97.8 °F (36.6 °C) 122 16 140/102 115 94 % -- -- --    10/12/24 21:24:22 -- 122 -- 104/75 85 96 % -- -- --    10/12/24 1910 -- -- -- -- -- -- -- -- No Pain     10/12/24 19:09:17 98.1 °F (36.7 °C) 113 16 106/75 85 95 % -- -- --    10/12/24 15:22:01 97.8 °F (36.6 °C) 104 -- 110/88 95 92 % -- -- --    10/12/24 09:31:15 -- 82 -- 126/84 98 95 % -- -- --    10/12/24 0850 -- 112 -- 136/90 -- -- -- -- --    10/12/24 07:31:35 97.6 °F (36.4 °C) 128 16 136/90 105 94 % -- -- No Pain    10/12/24 0600 -- 128 -- -- -- -- -- -- --    10/12/24 0530 -- 104 -- -- -- -- -- -- --    10/12/24 0500 -- 110 -- -- -- -- -- -- --    10/12/24 0434 -- -- -- -- -- -- -- -- 4    10/12/24 0430 -- 124 -- -- -- 94 % -- -- --    10/12/24 0400 -- 96 -- -- -- -- -- -- --    10/12/24 0330 -- 130 -- -- -- -- -- -- --    10/12/24 0300 -- 124 -- -- -- -- -- -- --    10/12/24 02:33:10 97.6 °F (36.4 °C) 128 16 124/79 94 92 % -- -- --    10/12/24 0200 -- 119 -- -- -- -- -- -- --    10/12/24 0130 -- 103 -- -- -- -- -- -- --    10/12/24 0100 -- 107 -- -- -- -- -- -- --    10/12/24 0030 -- 105 -- -- -- -- -- -- --    10/12/24 0000 -- 116 -- -- -- -- -- -- --    10/11/24 23:43:46 -- 114 -- 127/80 96 95 % -- -- --    10/11/24 2340 -- 138 -- -- -- -- -- -- --    10/11/24 2339 -- 120 -- -- -- 93 % -- -- --    10/11/24 2300 -- 115 -- -- -- 94 % -- -- --    10/11/24 22:13:48 97.8 °F (36.6 °C) 103 16 96/76 83 92 % -- -- --    10/11/24 2200 -- 117 -- -- -- 92 % -- -- --    10/11/24 2130 -- 115 -- -- -- -- -- -- --    10/11/24 2100 -- 98 -- -- -- -- -- -- --    10/11/24 2030 -- 99 -- -- -- 94 % -- -- --    10/11/24 2000 -- 115 -- -- -- 94 % -- -- --    10/11/24 1945 -- 100 -- -- -- 93 % -- -- --    10/11/24 1915 -- -- -- -- -- -- -- -- No Pain    10/11/24 19:12:47 97.2 °F (36.2 °C) 96 18 118/79 92 96 % None (Room air) -- No Pain    10/11/24 18:41:06 97.5 °F (36.4 °C) 98 16 110/77 88 96 % -- -- --    10/11/24 15:02:35 97.5 °F (36.4 °C) 100 -- 116/90 99 93 % -- -- --    10/11/24 1415 -- 122 29 113/83 94 95 % -- -- No Pain    10/11/24 1334 -- -- -- 118/78 -- -- -- -- --    10/11/24 1330 -- 134 31 -- -- 95 % -- -- --    10/11/24  1315 -- 130 26 -- -- 92 % -- -- --    10/11/24 1300 -- 132 26 -- -- 92 % -- -- --    10/11/24 1255 -- 128 26 -- -- -- -- -- --    10/11/24 1247 -- 143 -- 128/78 -- -- -- -- --    10/11/24 1245 -- 144 33 -- -- 93 % -- -- --    10/11/24 1230 -- 146 31 -- -- 92 % -- -- --    10/11/24 1215 -- 150 23 132/78 -- 94 % None (Room air) Lying No Pain    10/11/24 1145 -- 148 24 122/84 -- 96 % None (Room air) Sitting No Pain    10/11/24 1136 -- 140 23 110/70 -- 95 % None (Room air) Sitting No Pain    10/11/24 1128 -- -- -- 118/80 -- -- -- Sitting --    10/11/24 1124 -- 154 21 108/82 -- 96 % None (Room air) Sitting --    10/11/24 1100 98.2 °F (36.8 °C) 158 24 151/100 -- 96 % None (Room air) Lying No Pain              Pertinent Labs/Diagnostic Test Results:   Radiology:  XR chest 1 view portable   Final Interpretation by Jordy Patton MD (10/11 1304)      No acute cardiopulmonary disease.            Workstation performed: [id number]           Cardiology:  Echo complete w/ contrast if indicated   Final Result by Carlos Segal MD (10/12 1208)        Left Ventricle: Left ventricular cavity size is normal. Wall thickness    is mildly increased. There is borderline concentric hypertrophy. The left    ventricular ejection fraction is 50 to 55%. Systolic function is low    normal. Although no diagnostic regional wall motion abnormality was    identified, this possibility cannot be completely excluded on the basis of    this study.     Left Atrium: The atrium is mildly dilated.     Right Atrium: The atrium is mildly dilated.     Aortic Valve: The aortic valve is trileaflet. The leaflets are    moderately thickened. The leaflets are moderately calcified. There is    mildly reduced mobility. There is aortic valve sclerosis.     Mitral Valve: There is mild annular calcification. There is at least    mild regurgitation.     Tricuspid Valve: There is mild regurgitation.  Calculated pulmonary    artery pressure 35 to 40 mmHg.     IVC/SVC: The  inferior vena cava is normal in size. Respirophasic    changes were normal.  Liver cysts were noted consider dedicated liver    ultrasound for better evaluation.         ECG 12 lead   Final Result by Joni Hernandez DO (10/11 1550)   Atrial fibrillation with rapid ventricular response   Left axis deviation   Right bundle branch block   Abnormal ECG   When compared with ECG of 24-AUG-2023 13:50,   Atrial fibrillation is now present   Confirmed by Joni Hernandez ([id number]) on 10/11/2024 3:50:57 PM        GI:  No orders to display           Results from last 7 days   Lab Units 10/13/24  1028 10/12/24  0433 10/11/24  1113   WBC Thousand/uL 9.13 8.34 11.12*   HEMOGLOBIN g/dL 15.4 14.2 15.1   HEMATOCRIT % 47.4 43.6 47.3   PLATELETS Thousands/uL 179 162 188   TOTAL NEUT ABS Thousands/µL  --   --  8.02*         Results from last 7 days   Lab Units 10/12/24  0433 10/11/24  1113   SODIUM mmol/L 140 142   POTASSIUM mmol/L 3.5 3.8   CHLORIDE mmol/L 107 109*   CO2 mmol/L 25 24   ANION GAP mmol/L 8 9   BUN mg/dL 24 25   CREATININE mg/dL 1.08 1.14   EGFR ml/min/1.73sq m 67 63   CALCIUM mg/dL 8.4 9.3   MAGNESIUM mg/dL  --  2.2     Results from last 7 days   Lab Units 10/12/24  0433 10/11/24  1113   AST U/L 14 19   ALT U/L 20 26   ALK PHOS U/L 37 41   TOTAL PROTEIN g/dL 5.4* 6.2*   ALBUMIN g/dL 3.4* 3.9   TOTAL BILIRUBIN mg/dL 0.91 0.84         Results from last 7 days   Lab Units 10/12/24  0433 10/11/24  1113   GLUCOSE RANDOM mg/dL 98 89         Results from last 7 days   Lab Units 10/11/24  1113   HEMOGLOBIN A1C % 5.9*   EAG mg/dl 123     Results from last 7 days   Lab Units 10/11/24  1306 10/11/24  1113   HS TNI 0HR ng/L  --  9   HS TNI 2HR ng/L 9  --    HSTNI D2 ng/L 0  --          Results from last 7 days   Lab Units 10/11/24  1113   TSH 3RD GENERATON uIU/mL 0.523     Results from last 7 days   Lab Units 10/12/24  0433   DIGOXIN LVL ng/mL 1.2     Results from last 7 days   Lab Units 10/11/24  1113   BNP pg/mL 940*         Past Medical  History:   Diagnosis Date    A-fib (HCC)     Arthritis     shoulder    BPH (benign prostatic hyperplasia)     Disease of thyroid gland     nodules    Hypertension     Kidney calculi     left    Kidney stone     Nocturia     Urinary incontinence     During the night, urinary frequency during the day.     Present on Admission:   Liver cyst      Admitting Diagnosis: SOB (shortness of breath) [R06.02]  Atrial fibrillation with rapid ventricular response (HCC) [I48.91]  Age/Sex: 74 y.o. male    Network Utilization Review Department  ATTENTION: Please call with any questions or concerns to [phone] and carefully listen to the prompts so that you are directed to the right person. All voicemails are confidential.   For Discharge needs, contact Care Management DC Support Team at [phone] opt. [phone]  Send all requests for admission clinical reviews, approved or denied determinations and any other requests to dedicated fax number below belonging to the campus where the patient is receiving treatment. List of dedicated fax numbers for the Facilities:  FACILITY NAME UR FAX NUMBER   ADMISSION DENIALS (Administrative/Medical Necessity) [phone]   DISCHARGE SUPPORT TEAM (NETWORK) [phone]   PARENT CHILD HEALTH (Maternity/NICU/Pediatrics) [phone]   [facility] [phone]   [facility] [phone]   [facility] [phone]   [facility] [phone]   [facility] [phone]   [facility] [phone]   [facility] [phone]   [facility] [phone]   [facility] [phone]   [facility] [phone]   [facility] [phone]   [facility] [phone]

## 2024-10-13 LAB
ALBUMIN SERPL BCG-MCNC: 3.6 G/DL (ref 3.5–5)
ALP SERPL-CCNC: 38 U/L (ref 34–104)
ALT SERPL W P-5'-P-CCNC: 20 U/L (ref 7–52)
ANION GAP SERPL CALCULATED.3IONS-SCNC: 10 MMOL/L (ref 4–13)
AST SERPL W P-5'-P-CCNC: 13 U/L (ref 13–39)
ATRIAL RATE: 52 BPM
BILIRUB SERPL-MCNC: 0.91 MG/DL (ref 0.2–1)
BUN SERPL-MCNC: 21 MG/DL (ref 5–25)
CALCIUM SERPL-MCNC: 8.8 MG/DL (ref 8.4–10.2)
CHLORIDE SERPL-SCNC: 106 MMOL/L (ref 96–108)
CO2 SERPL-SCNC: 23 MMOL/L (ref 21–32)
CREAT SERPL-MCNC: 1.17 MG/DL (ref 0.6–1.3)
ERYTHROCYTE [DISTWIDTH] IN BLOOD BY AUTOMATED COUNT: 14.4 % (ref 11.6–15.1)
GFR SERPL CREATININE-BSD FRML MDRD: 61 ML/MIN/1.73SQ M
GLUCOSE SERPL-MCNC: 129 MG/DL (ref 65–140)
HCT VFR BLD AUTO: 47.4 % (ref 36.5–49.3)
HGB BLD-MCNC: 15.4 G/DL (ref 12–17)
MAGNESIUM SERPL-MCNC: 2 MG/DL (ref 1.9–2.7)
MCH RBC QN AUTO: 32.1 PG (ref 26.8–34.3)
MCHC RBC AUTO-ENTMCNC: 32.5 G/DL (ref 31.4–37.4)
MCV RBC AUTO: 99 FL (ref 82–98)
P AXIS: 20 DEGREES
PLATELET # BLD AUTO: 179 THOUSANDS/UL (ref 149–390)
PMV BLD AUTO: 8.9 FL (ref 8.9–12.7)
POTASSIUM SERPL-SCNC: 3.7 MMOL/L (ref 3.5–5.3)
PR INTERVAL: 150 MS
PROT SERPL-MCNC: 5.9 G/DL (ref 6.4–8.4)
QRS AXIS: -36 DEGREES
QRSD INTERVAL: 134 MS
QT INTERVAL: 444 MS
QTC INTERVAL: 412 MS
RBC # BLD AUTO: 4.8 MILLION/UL (ref 3.88–5.62)
SODIUM SERPL-SCNC: 139 MMOL/L (ref 135–147)
T WAVE AXIS: -9 DEGREES
VENTRICULAR RATE: 52 BPM
WBC # BLD AUTO: 9.13 THOUSAND/UL (ref 4.31–10.16)

## 2024-10-13 PROCEDURE — 94762 N-INVAS EAR/PLS OXIMTRY CONT: CPT

## 2024-10-13 PROCEDURE — 80053 COMPREHEN METABOLIC PANEL: CPT | Performed by: PHYSICIAN ASSISTANT

## 2024-10-13 PROCEDURE — 83735 ASSAY OF MAGNESIUM: CPT | Performed by: PHYSICIAN ASSISTANT

## 2024-10-13 PROCEDURE — 93010 ELECTROCARDIOGRAM REPORT: CPT | Performed by: INTERNAL MEDICINE

## 2024-10-13 PROCEDURE — 93005 ELECTROCARDIOGRAM TRACING: CPT

## 2024-10-13 PROCEDURE — 99232 SBSQ HOSP IP/OBS MODERATE 35: CPT | Performed by: INTERNAL MEDICINE

## 2024-10-13 PROCEDURE — 85027 COMPLETE CBC AUTOMATED: CPT | Performed by: PHYSICIAN ASSISTANT

## 2024-10-13 RX ORDER — FUROSEMIDE 10 MG/ML
20 INJECTION INTRAMUSCULAR; INTRAVENOUS ONCE
Status: COMPLETED | OUTPATIENT
Start: 2024-10-13 | End: 2024-10-13

## 2024-10-13 RX ORDER — POTASSIUM CHLORIDE 1500 MG/1
20 TABLET, EXTENDED RELEASE ORAL ONCE
Status: COMPLETED | OUTPATIENT
Start: 2024-10-13 | End: 2024-10-13

## 2024-10-13 RX ADMIN — DILTIAZEM HYDROCHLORIDE 15 MG/HR: 5 INJECTION, SOLUTION INTRAVENOUS at 03:41

## 2024-10-13 RX ADMIN — APIXABAN 5 MG: 5 TABLET, FILM COATED ORAL at 17:23

## 2024-10-13 RX ADMIN — METOPROLOL TARTRATE 25 MG: 25 TABLET, FILM COATED ORAL at 08:33

## 2024-10-13 RX ADMIN — POTASSIUM CHLORIDE 20 MEQ: 1500 TABLET, EXTENDED RELEASE ORAL at 12:45

## 2024-10-13 RX ADMIN — METOPROLOL TARTRATE 25 MG: 25 TABLET, FILM COATED ORAL at 16:31

## 2024-10-13 RX ADMIN — METOPROLOL TARTRATE 25 MG: 25 TABLET, FILM COATED ORAL at 21:48

## 2024-10-13 RX ADMIN — ATORVASTATIN CALCIUM 20 MG: 20 TABLET, FILM COATED ORAL at 16:31

## 2024-10-13 RX ADMIN — FUROSEMIDE 20 MG: 10 INJECTION, SOLUTION INTRAMUSCULAR; INTRAVENOUS at 01:47

## 2024-10-13 RX ADMIN — ENOXAPARIN SODIUM 80 MG: 80 INJECTION SUBCUTANEOUS at 08:33

## 2024-10-13 NOTE — PROGRESS NOTES
Progress Note - Cardiology   [facility]     Regino Huffman 74 y.o. male MRN: [id number]  : 1950  Unit/Bed#: 4 [place] 419-01 Encounter: [id number]    Assessment and Plan:     Assessment & Plan  Atrial fibrillation with RVR (HCC)  Patient with documented history of paroxysmal atrial fibrillation in 2022 while hospitalized for COVID-19.  10/11/24 EKG: Atrial fibrillation with rapid ventricular response, 167 bpm.  RBBB.  Patient converted from atrial fibrillation to sinus rhythm on morning of 10/13/24. 10/12-10/13 Telemetry reviewed: Currently sinus rhythm with PVCs, 66 bpm.  Converted from atrial fibrillation to sinus rhythm at 0617 hrs.   Currently on Lopressor 25 mg 3 times daily.  Stop Cardizem drip, patient now in sinus rhythm.  [id number] stroke risk score: 2 points, moderate to high risk.  Currently on therapeutic Lovenox.  Will transition to Eliquis 5 mg twice daily.  22 TTE: LVEF 60%.  Diastolic function normal for age.  Mitral valve with trace regurgitation. Tricuspid valve trace regurgitation.  Plan for nuclear stress test on 10/14/24.  10/13/24 overnight pulse oximetry: AHI 39, suspected pathological breathing disorder.  Respiratory therapist does report that patient woke up multiple times during the study to use the bathroom.  Would recommend formal outpatient sleep study to rule out obstructive sleep apnea.  Elevated brain natriuretic peptide (BNP) level  10/11/24 BNP: 940.   10/11/24 CXR: No acute cardiopulmonary disease.   Received one-time dose of Lasix 40 mg IV on 10/11/24.   10/12/24 TTE: LVEF 50 to 55%.  Unable to assess diastolic function due to atrial fibrillation.  Bilateral atrium mildly dilated.  Aortic valve sclerosis.  At least mild mitral regurgitation.  Mild tricuspid valve regurgitation.  Calculated pulmonary artery pressure 35 to 40 mmHg.  Monitor I/Os.   Hypertension  BP currently acceptable.   Currently on Lopressor 25 mg 3 times daily.  Stop  "Cardizem drip, patient is converted to sinus rhythm.  Continue to monitor BP.  Hyperlipidemia  6/26/24 lipid panel: Cholesterol 233, triglycerides 192, HDL 74, .  ASCVD 10-year risk score 21.8%, high risk.  Continue Lipitor 20 mg daily.  RBBB  Chronic, noted on EKG since 2022.  Pre-diabetes  10/11/24 HgbA1c: 5.9.   Care per primary team.     Subjective / Objective:         Patient converted from atrial fibrillation to sinus rhythm on morning of 10/13/24. Patient reports feeling well. Patient denies experiencing chest pain, palpitations, shortness of breath, lightheadedness, dizziness, headache and nausea, vomiting.    Vitals: Blood pressure 146/95, pulse 75, temperature 97.6 °F (36.4 °C), resp. rate 16, height 5' 6\" (1.676 m), weight 75.3 kg (166 lb), SpO2 97%.  Vitals:    10/11/24 1502 10/12/24 0850   Weight: 75.7 kg (166 lb 12.8 oz) 75.3 kg (166 lb)     Body mass index is 26.79 kg/m².  BP Readings from Last 3 Encounters:   10/13/24 146/95   09/23/24 136/88   07/08/24 136/74     Orthostatic Blood Pressures      Flowsheet Row Most Recent Value   Blood Pressure 146/95 filed at 10/13/2024 0724   Patient Position - Orthostatic VS Lying filed at 10/11/2024 1215          I/O         10/10 0701  10/11 0700 10/11 0701  10/12 0700 10/12 0701  10/13 0700    Urine (mL/kg/hr)  1475     Total Output  1475     Net  -1475                  Invasive Devices       Peripheral Intravenous Line  Duration             Peripheral IV 10/11/24 Left Antecubital 1 day    Peripheral IV 10/11/24 Left Wrist 1 day                      Intake/Output Summary (Last 24 hours) at 10/13/2024 1022  Last data filed at 10/13/2024 0551  Gross per 24 hour   Intake --   Output 2500 ml   Net -2500 ml         Physical Exam:   Physical Exam  Vitals reviewed.   Constitutional:       General: He is not in acute distress.  Cardiovascular:      Rate and Rhythm: Normal rate and regular rhythm.      Pulses: Normal pulses.      Heart sounds: No murmur " heard.  Pulmonary:      Effort: Pulmonary effort is normal. No respiratory distress.      Breath sounds: Normal breath sounds.   Abdominal:      General: Abdomen is flat. There is no distension.      Palpations: Abdomen is soft.      Tenderness: There is no abdominal tenderness.   Musculoskeletal:      Right lower leg: No edema.      Left lower leg: No edema.   Skin:     General: Skin is warm and dry.   Neurological:      Mental Status: He is alert and oriented to person, place, and time.       Medications/ Allergies:     Current Facility-Administered Medications   Medication Dose Route Frequency Provider Last Rate    acetaminophen  650 mg Oral Q4H PRN Lewis Srivastava DO      atorvastatin  20 mg Oral Daily With Dinner Marisol Price PA-C      enoxaparin  1 mg/kg Subcutaneous Q12H [facility] Marisol Price PA-C      metoprolol tartrate  25 mg Oral TID Carlos Segal MD      ondansetron  4 mg Intravenous Q4H PRN Lewis Srivastava DO       acetaminophen, 650 mg, Q4H PRN  ondansetron, 4 mg, Q4H PRN      No Known Allergies    VTE Pharmacologic Prophylaxis:   Eliquis    Labs:   Troponins:  Results from last 7 days   Lab Units 10/11/24  1306   HSTNI D2 ng/L 0         CBC with diff:  Results from last 7 days   Lab Units 10/12/24  0433 10/11/24  1113   WBC Thousand/uL 8.34 11.12*   HEMOGLOBIN g/dL 14.2 15.1   HEMATOCRIT % 43.6 47.3   MCV fL 99* 100*   PLATELETS Thousands/uL 162 188   RBC Million/uL 4.41 4.73   MCH pg 32.2 31.9   MCHC g/dL 32.6 31.9   RDW % 14.5 14.6   MPV fL 9.0 9.0   NRBC AUTO /100 WBCs  --  0       CMP:  Results from last 7 days   Lab Units 10/12/24  0433 10/11/24  1113   SODIUM mmol/L 140 142   POTASSIUM mmol/L 3.5 3.8   CHLORIDE mmol/L 107 109*   CO2 mmol/L 25 24   ANION GAP mmol/L 8 9   BUN mg/dL 24 25   CREATININE mg/dL 1.08 1.14   CALCIUM mg/dL 8.4 9.3   AST U/L 14 19   ALT U/L 20 26   ALK PHOS U/L 37 41   TOTAL PROTEIN g/dL 5.4* 6.2*   ALBUMIN g/dL 3.4* 3.9   TOTAL BILIRUBIN mg/dL 0.91 0.84   EGFR ml/min/1.73sq m  67 63       Magnesium:  Results from last 7 days   Lab Units 10/11/24  1113   MAGNESIUM mg/dL 2.2     TSH:  Results from last 7 days   Lab Units 10/11/24  1113   TSH 3RD GENERATON uIU/mL 0.523     Hgb A1c:  Results from last 7 days   Lab Units 10/11/24  1113   HEMOGLOBIN A1C % 5.9*       Imaging & Testing   I have personally reviewed pertinent reports.    XR chest 1 view portable    Result Date: 10/11/2024    Impression: No acute cardiopulmonary disease.      EKG / Monitor: Personally reviewed.      Telemetry reviewed: Currently sinus rhythm with PVCs, 66 bpm.  Converted from atrial fibrillation to sinus rhythm at 0617 hrs.     Cardiac testing:   Exercise stress test  Result date: 8/24/23       The stress ECG is negative for ischemia after maximal exercise without chest pain but with significant fatigue requiring stopping the treadmill after 1 minute and 31 seconds.  Patient's exercise capacity is severely diminished.  Because of baseline RBBB and decreased exercise capacity, specificity of the study is limited.  If clinically indicated, a pharmacologic nuclear stress test may be performed.         Resting ECG     Resting ECG ECG is abnormal. The ECG shows normal sinus rhythm. There are nonspecific ST and T changes. ECG demonstrates right bundle branch block.   Stress Findings     Stress Findings A Maicol protocol stress test was performed. Lungs, no wheezing auscultated. The patient reached stage 1.0 of the protocol after exercising for 1 min and 31 sec and had a maximal HR of 137 bpm (93 % of MPHR) and 4.6 METS. The patient experienced no angina during the test. The test was stopped because the patient experienced fatigue and dyspnea. The patient reported dyspnea and fatigue during the stress test. Onset of symptoms occurred at stage 1 of the protocol. Symptoms ended during recovery. Blood pressure demonstrated a normal response and heart rate demonstrated a normal response to stress. The patient's heart rate  recovery was normal.   Stress ECG     Stress ECG No significant ST segment changes from baseline There were no arrhythmias during stress. There were no arrhythmias during recovery. . The ECG was negative for ischemia. The stress ECG is negative for ischemia after maximal exercise without chest pain but with significant fatigue requiring stopping the treadmill after 1 minute and 31 seconds.  Patient's exercise capacity is severely diminished.  Because of baseline RBBB and decreased exercise capacity, specificity of the study is limited.  If clinically indicated, a pharmacologic nuclear stress test may be performed.         Echo   Result date: 12/23/22           Left Ventricle Left ventricular cavity size is normal. Wall thickness is normal. The left ventricular ejection fraction is 60% by visual estimation. Systolic function is normal.  Wall motion is normal. Diastolic function is normal for age.  Left atrial filling pressure is normal.   Right Ventricle Right ventricular cavity size is normal. Systolic function is normal. Wall thickness is normal.   Left Atrium The atrium is normal in size (16-34 mL/m2).   Right Atrium The atrium is normal in size.   Aortic Valve The aortic valve is trileaflet. The leaflets are not thickened. The leaflets are moderately calcified. There is mildly reduced mobility. There is no evidence of regurgitation. The aortic valve has no significant stenosis.   Mitral Valve Mitral valve structure is normal. There is trace regurgitation. There is no evidence of stenosis.   Tricuspid Valve Tricuspid valve structure is normal. There is trace regurgitation. There is no evidence of stenosis. The right ventricular systolic pressure is normal. The estimated right ventricular systolic pressure is 22.00 mmHg.   Pulmonic Valve Pulmonic valve structure is normal. There is trace regurgitation. There is no evidence of stenosis.   Ascending Aorta The aortic root is normal in size.   IVC/SVC The right atrial  pressure is estimated at 8.0 mmHg. The inferior vena cava is normal in size.   Pericardium There is no pericardial effusion. The pericardium is normal in appearance.           Marisol Price PA-C

## 2024-10-13 NOTE — ASSESSMENT & PLAN NOTE
BP currently acceptable.   Currently on Lopressor 25 mg 3 times daily.  Stop Cardizem drip, patient is converted to sinus rhythm.  Continue to monitor BP.

## 2024-10-13 NOTE — PROGRESS NOTES
Progress Note - Hospitalist   Name: Regino Huffman 74 y.o. male I MRN: [id number]  Unit/Bed#: 4 [address] I Date of Admission: 10/11/2024   Date of Service: 10/13/2024 I Hospital Day: 2    Assessment & Plan  Atrial fibrillation with RVR (HCC)  History of paroxysmal atrial fibrillation while hospitalized for COVID, hypertension, hyperlipidemia who presented to the hospital for worsening shortness of breath found to have rapid atrial fibrillation  Seen by cardiology.  Eventually converted to sinus rhythm today  Was on diltiazem infusion, given digoxin, and on metoprolol  Anticoagulation: Prescription for Eliquis sent to pharmacy  Cardiology planning stress test tomorrow  Hypertension  Prior to admission on metoprolol and amlodipine.  Holding amlodipine while undergoing rate control of atrial fibrillation  Liver cyst  Known to patient.  Seen on echocardiogram this admission.  Present on imaging since 2013  Will check ultrasound to ensure it is not enlarging  Hyperlipidemia  Started on atorvastatin by cardiology  RBBB  Chronic upon review  Pre-diabetes  Lab Results   Component Value Date    HGBA1C 5.9 (H) 10/11/2024     Results from last 7 days   Lab Units 10/13/24  1028 10/12/24  0433 10/11/24  1113   GLUCOSE RANDOM mg/dL 129 98 89     Elevated brain natriuretic peptide (BNP) level  Presentation with shortness of breath found to have elevated BNP  Given one-time dose of furosemide.  Cardiogram: LVEF 50 to 55%    VTE Pharmacologic Prophylaxis: VTE Score: 3 Moderate Risk (Score 3-4) - Pharmacological DVT Prophylaxis Ordered: apixaban (Eliquis).    Mobility:   Basic Mobility Inpatient Raw Score: 24  JH-HLM Goal: 8: Walk 250 feet or more  JH-HLM Achieved: 7: Walk 25 feet or more  JH-HLM Goal NOT achieved. Continue with multidisciplinary rounding and encourage appropriate mobility to improve upon JH-HLM goals.    Patient Centered Rounds: I have performed bedside rounds with nursing staff today.  Discussions with  Specialists or Other Care Team Provider: Case management and cardiology    Education and Discussions with Family / Patient: Updated  (daughter) at bedside.    Current Length of Stay: 2 day(s)  Current Patient Status: Inpatient   Certification Statement: The patient will continue to require additional inpatient hospital stay due to stress test tomorrow  Discharge Plan: Anticipate discharge in 24-48 hrs to home.    Code Status: Level 1 - Full Code    Subjective   Patient seen and examined.  Shortness of breath overnight but feeling much better this morning after conversion to sinus rhythm.  Feeling weak.    Objective   Vitals:   Temp (24hrs), Av.8 °F (36.6 °C), Min:97.6 °F (36.4 °C), Max:98.1 °F (36.7 °C)    Temp:  [97.6 °F (36.4 °C)-98.1 °F (36.7 °C)] 97.6 °F (36.4 °C)  HR:  [] 75  Resp:  [16-26] 16  BP: (104-146)/() 146/95  SpO2:  [92 %-97 %] 97 %  Body mass index is 26.79 kg/m².     Input and Output Summary (last 24 hours):     Intake/Output Summary (Last 24 hours) at 10/13/2024 1211  Last data filed at 10/13/2024 0551  Gross per 24 hour   Intake --   Output 2500 ml   Net -2500 ml       Physical Exam  Vitals reviewed.   Constitutional:       General: He is not in acute distress.  HENT:      Head: Atraumatic.   Eyes:      General: No scleral icterus.  Cardiovascular:      Rate and Rhythm: Regular rhythm. Bradycardia present.      Heart sounds: Normal heart sounds.   Pulmonary:      Effort: Pulmonary effort is normal. No respiratory distress.   Abdominal:      General: Bowel sounds are normal.      Palpations: Abdomen is soft.      Tenderness: There is no abdominal tenderness.   Musculoskeletal:         General: No swelling or tenderness.   Skin:     General: Skin is warm and dry.   Neurological:      General: No focal deficit present.      Mental Status: He is alert and oriented to person, place, and time.      Cranial Nerves: No cranial nerve deficit.   Psychiatric:         Mood and  Affect: Mood normal.       Lines/Drains:    Invasive Devices       Peripheral Intravenous Line  Duration             Peripheral IV 10/11/24 Left Antecubital 2 days    Peripheral IV 10/11/24 Left Wrist 2 days                      Telemetry:  Telemetry Orders (From admission, onward)               24 Hour Telemetry Monitoring  Continuous x 24 Hours (Telem)        Question:  Reason for 24 Hour Telemetry  Answer:  Arrhythmias requiring acute medical intervention / PPM or ICD malfunction                     Telemetry Reviewed: Sinus Bradycardia  Indication for Continued Telemetry Use: Arrthymias requiring medical therapy               Lab Results: I have reviewed the following results:   Results from last 7 days   Lab Units 10/13/24  1028 10/12/24  0433 10/11/24  1113   WBC Thousand/uL 9.13 8.34 11.12*   HEMOGLOBIN g/dL 15.4 14.2 15.1   PLATELETS Thousands/uL 179 162 188   MCV fL 99* 99* 100*     Results from last 7 days   Lab Units 10/13/24  1028 10/12/24  0433 10/11/24  1113   SODIUM mmol/L 139 140 142   POTASSIUM mmol/L 3.7 3.5 3.8   CHLORIDE mmol/L 106 107 109*   CO2 mmol/L 23 25 24   ANION GAP mmol/L 10 8 9   BUN mg/dL 21 24 25   CREATININE mg/dL 1.17 1.08 1.14   CALCIUM mg/dL 8.8 8.4 9.3   ALBUMIN g/dL 3.6 3.4* 3.9   TOTAL BILIRUBIN mg/dL 0.91 0.91 0.84   ALK PHOS U/L 38 37 41   ALT U/L 20 20 26   AST U/L 13 14 19   EGFR ml/min/1.73sq m 61 67 63   GLUCOSE RANDOM mg/dL 129 98 89     Results from last 7 days   Lab Units 10/13/24  1028 10/11/24  1113   MAGNESIUM mg/dL 2.0 2.2         Results from last 7 days   Lab Units 10/11/24  1306 10/11/24  1113   HS TNI 0HR ng/L  --  9   HS TNI 2HR ng/L 9  --                   Results from last 7 days   Lab Units 10/11/24  1113   HEMOGLOBIN A1C % 5.9*     Results from last 7 days   Lab Units 10/11/24  1113   TSH 3RD GENERATON uIU/mL 0.523       Recent Cultures (last 7 days):         Imaging:  Reviewed radiology reports from this admission including:  Echo complete w/ contrast if  indicated    Result Date: 10/12/2024  Narrative:   Left Ventricle: Left ventricular cavity size is normal. Wall thickness is mildly increased. There is borderline concentric hypertrophy. The left ventricular ejection fraction is 50 to 55%. Systolic function is low normal. Although no diagnostic regional wall motion abnormality was identified, this possibility cannot be completely excluded on the basis of this study.   Left Atrium: The atrium is mildly dilated.   Right Atrium: The atrium is mildly dilated.   Aortic Valve: The aortic valve is trileaflet. The leaflets are moderately thickened. The leaflets are moderately calcified. There is mildly reduced mobility. There is aortic valve sclerosis.   Mitral Valve: There is mild annular calcification. There is at least mild regurgitation.   Tricuspid Valve: There is mild regurgitation.  Calculated pulmonary artery pressure 35 to 40 mmHg.   IVC/SVC: The inferior vena cava is normal in size. Respirophasic changes were normal.  Liver cysts were noted consider dedicated liver ultrasound for better evaluation.           Last 24 Hours Medication List:     Current Facility-Administered Medications:     acetaminophen (TYLENOL) tablet 650 mg, Q4H PRN    apixaban (ELIQUIS) tablet 5 mg, BID    atorvastatin (LIPITOR) tablet 20 mg, Daily With Dinner    metoprolol tartrate (LOPRESSOR) tablet 25 mg, TID    ondansetron (ZOFRAN) injection 4 mg, Q4H PRN    potassium chloride (Klor-Con M20) CR tablet 20 mEq, Once    Administrative Statements   Today, Patient Was Seen By: Lewis Srivastava, DO  I have spent a total time of 35 minutes in caring for this patient on the day of the visit/encounter including Patient and family education, Documenting in the medical record, Reviewing / ordering tests, medicine, procedures  , Obtaining or reviewing history  , and Communicating with other healthcare professionals .    **Please Note: This note may have been constructed using a voice recognition  system.**

## 2024-10-13 NOTE — RESPIRATORY THERAPY NOTE
Overnight pulse oximetry done on RA.Pt woke up multiple times during study for the bathroom due to being given diuretics

## 2024-10-13 NOTE — ASSESSMENT & PLAN NOTE
Presentation with shortness of breath found to have elevated BNP  Given one-time dose of furosemide.  Cardiogram: LVEF 50 to 55%

## 2024-10-13 NOTE — ASSESSMENT & PLAN NOTE
History of paroxysmal atrial fibrillation while hospitalized for COVID, hypertension, hyperlipidemia who presented to the hospital for worsening shortness of breath found to have rapid atrial fibrillation  Seen by cardiology.  Eventually converted to sinus rhythm today  Was on diltiazem infusion, given digoxin, and on metoprolol  Anticoagulation: Prescription for Eliquis sent to pharmacy  Cardiology planning stress test tomorrow

## 2024-10-13 NOTE — ASSESSMENT & PLAN NOTE
Lab Results   Component Value Date    HGBA1C 5.9 (H) 10/11/2024     Results from last 7 days   Lab Units 10/13/24  1028 10/12/24  0433 10/11/24  1113   GLUCOSE RANDOM mg/dL 129 98 89

## 2024-10-13 NOTE — ASSESSMENT & PLAN NOTE
10/11/24 BNP: 940.   10/11/24 CXR: No acute cardiopulmonary disease.   Received one-time dose of Lasix 40 mg IV on 10/11/24.   10/12/24 TTE: LVEF 50 to 55%.  Unable to assess diastolic function due to atrial fibrillation.  Bilateral atrium mildly dilated.  Aortic valve sclerosis.  At least mild mitral regurgitation.  Mild tricuspid valve regurgitation.  Calculated pulmonary artery pressure 35 to 40 mmHg.  Monitor I/Os.

## 2024-10-13 NOTE — ASSESSMENT & PLAN NOTE
Known to patient.  Seen on echocardiogram this admission.  Present on imaging since 2013  Will check ultrasound to ensure it is not enlarging

## 2024-10-13 NOTE — PLAN OF CARE
Problem: PAIN - ADULT  Goal: Verbalizes/displays adequate comfort level or baseline comfort level  Description: Interventions:  - Encourage patient to monitor pain and request assistance  - Assess pain using appropriate pain scale  - Administer analgesics based on type and severity of pain and evaluate response  - Implement non-pharmacological measures as appropriate and evaluate response  - Consider cultural and social influences on pain and pain management  - Notify physician/advanced practitioner if interventions unsuccessful or patient reports new pain  Outcome: Progressing     Problem: INFECTION - ADULT  Goal: Absence or prevention of progression during hospitalization  Description: INTERVENTIONS:  - Assess and monitor for signs and symptoms of infection  - Monitor lab/diagnostic results  - Monitor all insertion sites, i.e. indwelling lines, tubes, and drains  - Monitor endotracheal if appropriate and nasal secretions for changes in amount and color  - [place] appropriate cooling/warming therapies per order  - Administer medications as ordered  - Instruct and encourage patient and family to use good hand hygiene technique  - Identify and instruct in appropriate isolation precautions for identified infection/condition  Outcome: Progressing  Goal: Absence of fever/infection during neutropenic period  Description: INTERVENTIONS:  - Monitor WBC    Outcome: Progressing    Goal: Maintain or return to baseline ADL function  Description: INTERVENTIONS:  -  Assess patient's ability to carry out ADLs; assess patient's baseline for ADL function and identify physical deficits which impact ability to perform ADLs (bathing, care of mouth/teeth, toileting, grooming, dressing, etc.)  - Assess/evaluate cause of self-care deficits   - Assess range of motion  - Assess patient's mobility; develop plan if impaired  - Assess patient's need for assistive devices and provide as appropriate  - Encourage maximum independence but  intervene and supervise when necessary  - Involve family in performance of ADLs  - Assess for home care needs following discharge   - Consider OT consult to assist with ADL evaluation and planning for discharge  - Provide patient education as appropriate  Outcome: Progressing  Goal: Maintains/Returns to pre admission functional level  Description: INTERVENTIONS:  - Perform AM-PAC 6 Click Basic Mobility/ Daily Activity assessment daily.  - Set and communicate daily mobility goal to care team and patient/family/caregiver.   - Collaborate with rehabilitation services on mobility goals if consulted  - Perform Range of Motion 3 times a day.  - Reposition patient every 2 hours.  - Dangle patient 3 times a day  - Stand patient 3 times a day  - Ambulate patient 3 times a day  - Out of bed to chair 3 times a day   - Out of bed for meals 3 times a day  - Out of bed for toileting  - Record patient progress and toleration of activity level   Outcome: Progressing     Problem: DISCHARGE PLANNING  Goal: Discharge to home or other facility with appropriate resources  Description: INTERVENTIONS:  - Identify barriers to discharge w/patient and caregiver  - Arrange for needed discharge resources and transportation as appropriate  - Identify discharge learning needs (meds, wound care, etc.)  - Arrange for interpretive services to assist at discharge as needed  - Refer to Case Management Department for coordinating discharge planning if the patient needs post-hospital services based on physician/advanced practitioner order or complex needs related to functional status, cognitive ability, or social support system  Outcome: Progressing     Problem: Knowledge Deficit  Goal: Patient/family/caregiver demonstrates understanding of disease process, treatment plan, medications, and discharge instructions  Description: Complete learning assessment and assess knowledge base.  Interventions:  - Provide teaching at level of understanding  - Provide  teaching via preferred learning methods  Outcome: Progressing     Problem: CARDIOVASCULAR - ADULT  Goal: Maintains optimal cardiac output and hemodynamic stability  Description: INTERVENTIONS:  - Monitor I/O, vital signs and rhythm  - Monitor for S/S and trends of decreased cardiac output  - Administer and titrate ordered vasoactive medications to optimize hemodynamic stability  - Assess quality of pulses, skin color and temperature  - Assess for signs of decreased coronary artery perfusion  - Instruct patient to report change in severity of symptoms  Outcome: Progressing  Goal: Absence of cardiac dysrhythmias or at baseline rhythm  Description: INTERVENTIONS:  - Continuous cardiac monitoring, vital signs, obtain 12 lead EKG if ordered  - Administer antiarrhythmic and heart rate control medications as ordered  - Monitor electrolytes and administer replacement therapy as ordered  Outcome: Progressing

## 2024-10-13 NOTE — ASSESSMENT & PLAN NOTE
Patient with documented history of paroxysmal atrial fibrillation in June 2022 while hospitalized for COVID-19.  10/11/24 EKG: Atrial fibrillation with rapid ventricular response, 167 bpm.  RBBB.  Patient converted from atrial fibrillation to sinus rhythm on morning of 10/13/24. 10/12-10/13 Telemetry reviewed: Currently sinus rhythm with PVCs, 66 bpm.  Converted from atrial fibrillation to sinus rhythm at 0617 hrs.   Currently on Lopressor 25 mg 3 times daily.  Stop Cardizem drip, patient now in sinus rhythm.  [id number] stroke risk score: 2 points, moderate to high risk.  Currently on therapeutic Lovenox.  Will transition to Eliquis 5 mg twice daily.  12/23/22 TTE: LVEF 60%.  Diastolic function normal for age.  Mitral valve with trace regurgitation. Tricuspid valve trace regurgitation.  Plan for nuclear stress test on 10/14/24.  10/13/24 overnight pulse oximetry: AHI 39, suspected pathological breathing disorder.  Respiratory therapist does report that patient woke up multiple times during the study to use the bathroom.  Would recommend formal outpatient sleep study to rule out obstructive sleep apnea.

## 2024-10-14 ENCOUNTER — APPOINTMENT (INPATIENT)
Dept: NON INVASIVE DIAGNOSTICS | Facility: HOSPITAL | Age: 74
DRG: 291 | End: 2024-10-14
Payer: COMMERCIAL

## 2024-10-14 ENCOUNTER — APPOINTMENT (INPATIENT)
Dept: RADIOLOGY | Facility: HOSPITAL | Age: 74
DRG: 291 | End: 2024-10-14
Payer: COMMERCIAL

## 2024-10-14 VITALS
RESPIRATION RATE: 18 BRPM | HEART RATE: 69 BPM | DIASTOLIC BLOOD PRESSURE: 99 MMHG | OXYGEN SATURATION: 96 % | TEMPERATURE: 97.9 F | BODY MASS INDEX: 26.68 KG/M2 | WEIGHT: 166 LBS | HEIGHT: 66 IN | SYSTOLIC BLOOD PRESSURE: 149 MMHG

## 2024-10-14 PROBLEM — I45.10 RBBB: Status: RESOLVED | Noted: 2024-10-11 | Resolved: 2024-10-14

## 2024-10-14 PROBLEM — I50.30 DIASTOLIC HEART FAILURE (HCC): Status: ACTIVE | Noted: 2024-10-14

## 2024-10-14 LAB
ANION GAP SERPL CALCULATED.3IONS-SCNC: 6 MMOL/L (ref 4–13)
ATRIAL RATE: 65 BPM
BUN SERPL-MCNC: 20 MG/DL (ref 5–25)
CALCIUM SERPL-MCNC: 8.5 MG/DL (ref 8.4–10.2)
CHEST PAIN STATEMENT: NORMAL
CHLORIDE SERPL-SCNC: 106 MMOL/L (ref 96–108)
CO2 SERPL-SCNC: 25 MMOL/L (ref 21–32)
CREAT SERPL-MCNC: 1.01 MG/DL (ref 0.6–1.3)
GFR SERPL CREATININE-BSD FRML MDRD: 72 ML/MIN/1.73SQ M
GLUCOSE SERPL-MCNC: 98 MG/DL (ref 65–140)
MAX DIASTOLIC BP: 93 MMHG
MAX HR PERCENT: 56 %
MAX HR: 83 BPM
MAX PREDICTED HEART RATE: 146 BPM
NUC STRESS EJECTION FRACTION: 48 %
P AXIS: 44 DEGREES
POTASSIUM SERPL-SCNC: 3.8 MMOL/L (ref 3.5–5.3)
PR INTERVAL: 158 MS
PROTOCOL NAME: NORMAL
QRS AXIS: -35 DEGREES
QRSD INTERVAL: 138 MS
QT INTERVAL: 428 MS
QTC INTERVAL: 445 MS
RATE PRESSURE PRODUCT: 8418
REASON FOR TERMINATION: NORMAL
SL CV REST NUCLEAR ISOTOPE DOSE: 11 MCI
SL CV STRESS NUCLEAR ISOTOPE DOSE: 31.8 MCI
SL CV STRESS RECOVERY BP: NORMAL MMHG
SL CV STRESS RECOVERY HR: 68 BPM
SL CV STRESS RECOVERY O2 SAT: 97 %
SODIUM SERPL-SCNC: 137 MMOL/L (ref 135–147)
STRESS ANGINA INDEX: 0
STRESS BASELINE BP: NORMAL MMHG
STRESS BASELINE HR: 61 BPM
STRESS O2 SAT REST: 95 %
STRESS PEAK HR: 61 BPM
STRESS POST ESTIMATED WORKLOAD: 1 METS
STRESS POST EXERCISE DUR MIN: 1 MIN
STRESS POST EXERCISE DUR MIN: 1 MIN
STRESS POST EXERCISE DUR SEC: 1 SEC
STRESS POST O2 SAT PEAK: 99 %
STRESS POST PEAK BP: 138 MMHG
STRESS POST PEAK HR: 83 BPM
STRESS POST PEAK SYSTOLIC BP: 147 MMHG
STRESS/REST PERFUSION RATIO: 0.95
T WAVE AXIS: -12 DEGREES
TARGET HR FORMULA: NORMAL
TEST INDICATION: NORMAL
VENTRICULAR RATE: 65 BPM

## 2024-10-14 PROCEDURE — 78452 HT MUSCLE IMAGE SPECT MULT: CPT

## 2024-10-14 PROCEDURE — 80048 BASIC METABOLIC PNL TOTAL CA: CPT | Performed by: INTERNAL MEDICINE

## 2024-10-14 PROCEDURE — 99232 SBSQ HOSP IP/OBS MODERATE 35: CPT | Performed by: INTERNAL MEDICINE

## 2024-10-14 PROCEDURE — 76705 ECHO EXAM OF ABDOMEN: CPT

## 2024-10-14 PROCEDURE — 99239 HOSP IP/OBS DSCHRG MGMT >30: CPT | Performed by: INTERNAL MEDICINE

## 2024-10-14 PROCEDURE — 93016 CV STRESS TEST SUPVJ ONLY: CPT | Performed by: INTERNAL MEDICINE

## 2024-10-14 PROCEDURE — 93018 CV STRESS TEST I&R ONLY: CPT | Performed by: INTERNAL MEDICINE

## 2024-10-14 PROCEDURE — 93017 CV STRESS TEST TRACING ONLY: CPT

## 2024-10-14 PROCEDURE — A9502 TC99M TETROFOSMIN: HCPCS

## 2024-10-14 PROCEDURE — 93005 ELECTROCARDIOGRAM TRACING: CPT

## 2024-10-14 PROCEDURE — 93010 ELECTROCARDIOGRAM REPORT: CPT | Performed by: INTERNAL MEDICINE

## 2024-10-14 PROCEDURE — 78452 HT MUSCLE IMAGE SPECT MULT: CPT | Performed by: INTERNAL MEDICINE

## 2024-10-14 RX ORDER — METOPROLOL TARTRATE 50 MG
50 TABLET ORAL EVERY 12 HOURS SCHEDULED
Status: DISCONTINUED | OUTPATIENT
Start: 2024-10-14 | End: 2024-10-14

## 2024-10-14 RX ORDER — METOPROLOL TARTRATE 25 MG/1
25 TABLET, FILM COATED ORAL ONCE
Status: COMPLETED | OUTPATIENT
Start: 2024-10-14 | End: 2024-10-14

## 2024-10-14 RX ORDER — REGADENOSON 0.08 MG/ML
0.4 INJECTION, SOLUTION INTRAVENOUS ONCE
Status: COMPLETED | OUTPATIENT
Start: 2024-10-14 | End: 2024-10-14

## 2024-10-14 RX ORDER — ATORVASTATIN CALCIUM 20 MG/1
20 TABLET, FILM COATED ORAL
Qty: 30 TABLET | Refills: 0 | Status: SHIPPED | OUTPATIENT
Start: 2024-10-14 | End: 2024-10-23 | Stop reason: SDUPTHER

## 2024-10-14 RX ORDER — METOPROLOL TARTRATE 50 MG
50 TABLET ORAL EVERY 12 HOURS SCHEDULED
Status: DISCONTINUED | OUTPATIENT
Start: 2024-10-14 | End: 2024-10-14 | Stop reason: HOSPADM

## 2024-10-14 RX ORDER — METOPROLOL TARTRATE 50 MG
50 TABLET ORAL EVERY 12 HOURS SCHEDULED
Qty: 60 TABLET | Refills: 0 | Status: SHIPPED | OUTPATIENT
Start: 2024-10-14 | End: 2024-10-23 | Stop reason: SDUPTHER

## 2024-10-14 RX ADMIN — METOPROLOL TARTRATE 25 MG: 25 TABLET, FILM COATED ORAL at 08:38

## 2024-10-14 RX ADMIN — METOPROLOL TARTRATE 25 MG: 25 TABLET, FILM COATED ORAL at 08:19

## 2024-10-14 RX ADMIN — APIXABAN 5 MG: 5 TABLET, FILM COATED ORAL at 08:19

## 2024-10-14 RX ADMIN — REGADENOSON 0.4 MG: 0.08 INJECTION, SOLUTION INTRAVENOUS at 11:00

## 2024-10-14 NOTE — ASSESSMENT & PLAN NOTE
Lab Results   Component Value Date    HGBA1C 5.9 (H) 10/11/2024     Results from last 7 days   Lab Units 10/14/24  0442 10/13/24  1028 10/12/24  0433   GLUCOSE RANDOM mg/dL 98 129 98     Diet controlled

## 2024-10-14 NOTE — ASSESSMENT & PLAN NOTE
Prior to admission on metoprolol and amlodipine.  Holding amlodipine as BP appears acceptable on increased dose of metoprolol  Can discuss as an outpatient resuming at a lower dose if desired

## 2024-10-14 NOTE — PLAN OF CARE
Problem: PAIN - ADULT  Goal: Verbalizes/displays adequate comfort level or baseline comfort level  Description: Interventions:  - Encourage patient to monitor pain and request assistance  - Assess pain using appropriate pain scale  - Administer analgesics based on type and severity of pain and evaluate response  - Implement non-pharmacological measures as appropriate and evaluate response  - Consider cultural and social influences on pain and pain management  - Notify physician/advanced practitioner if interventions unsuccessful or patient reports new pain  Outcome: Adequate for Discharge     Problem: INFECTION - ADULT  Goal: Absence or prevention of progression during hospitalization  Description: INTERVENTIONS:  - Assess and monitor for signs and symptoms of infection  - Monitor lab/diagnostic results  - Monitor all insertion sites, i.e. indwelling lines, tubes, and drains  - Monitor endotracheal if appropriate and nasal secretions for changes in amount and color  - [place] appropriate cooling/warming therapies per order  - Administer medications as ordered  - Instruct and encourage patient and family to use good hand hygiene technique  - Identify and instruct in appropriate isolation precautions for identified infection/condition  Outcome: Adequate for Discharge  Goal: Absence of fever/infection during neutropenic period  Description: INTERVENTIONS:  - Monitor WBC    Outcome: Adequate for Discharge     Problem: SAFETY ADULT  Goal: Patient will remain free of falls  Description: INTERVENTIONS:  - Educate patient/family on patient safety including physical limitations  - Instruct patient to call for assistance with activity   - Consult OT/PT to assist with strengthening/mobility   - Keep Call bell within reach  - Keep bed low and locked with side rails adjusted as appropriate  - Keep care items and personal belongings within reach  - Initiate and maintain comfort rounds  - Make Fall Risk Sign visible to  staff  - Offer Toileting every 2 Hours, in advance of need  - Initiate/Maintain bed alarm  - Obtain necessary fall risk management equipment: slipper socks  - Apply yellow socks and bracelet for high fall risk patients  - Consider moving patient to room near nurses station  Outcome: Adequate for Discharge  Goal: Maintain or return to baseline ADL function  Description: INTERVENTIONS:  -  Assess patient's ability to carry out ADLs; assess patient's baseline for ADL function and identify physical deficits which impact ability to perform ADLs (bathing, care of mouth/teeth, toileting, grooming, dressing, etc.)  - Assess/evaluate cause of self-care deficits   - Assess range of motion  - Assess patient's mobility; develop plan if impaired  - Assess patient's need for assistive devices and provide as appropriate  - Encourage maximum independence but intervene and supervise when necessary  - Involve family in performance of ADLs  - Assess for home care needs following discharge   - Consider OT consult to assist with ADL evaluation and planning for discharge  - Provide patient education as appropriate  Outcome: Adequate for Discharge  Goal: Maintains/Returns to pre admission functional level  Description: INTERVENTIONS:  - Perform AM-PAC 6 Click Basic Mobility/ Daily Activity assessment daily.  - Set and communicate daily mobility goal to care team and patient/family/caregiver.   - Collaborate with rehabilitation services on mobility goals if consulted  - Perform Range of Motion 3 times a day.  - Reposition patient every 2 hours.  - Dangle patient 3 times a day  - Stand patient 3 times a day  - Ambulate patient 3 times a day  - Out of bed to chair 3 times a day   - Out of bed for meals 3 times a day  - Out of bed for toileting  - Record patient progress and toleration of activity level   Outcome: Adequate for Discharge     Problem: DISCHARGE PLANNING  Goal: Discharge to home or other facility with appropriate  resources  Description: INTERVENTIONS:  - Identify barriers to discharge w/patient and caregiver  - Arrange for needed discharge resources and transportation as appropriate  - Identify discharge learning needs (meds, wound care, etc.)  - Arrange for interpretive services to assist at discharge as needed  - Refer to Case Management Department for coordinating discharge planning if the patient needs post-hospital services based on physician/advanced practitioner order or complex needs related to functional status, cognitive ability, or social support system  Outcome: Adequate for Discharge     Problem: Knowledge Deficit  Goal: Patient/family/caregiver demonstrates understanding of disease process, treatment plan, medications, and discharge instructions  Description: Complete learning assessment and assess knowledge base.  Interventions:  - Provide teaching at level of understanding  - Provide teaching via preferred learning methods  Outcome: Adequate for Discharge     Problem: CARDIOVASCULAR - ADULT  Goal: Maintains optimal cardiac output and hemodynamic stability  Description: INTERVENTIONS:  - Monitor I/O, vital signs and rhythm  - Monitor for S/S and trends of decreased cardiac output  - Administer and titrate ordered vasoactive medications to optimize hemodynamic stability  - Assess quality of pulses, skin color and temperature  - Assess for signs of decreased coronary artery perfusion  - Instruct patient to report change in severity of symptoms  Outcome: Adequate for Discharge  Goal: Absence of cardiac dysrhythmias or at baseline rhythm  Description: INTERVENTIONS:  - Continuous cardiac monitoring, vital signs, obtain 12 lead EKG if ordered  - Administer antiarrhythmic and heart rate control medications as ordered  - Monitor electrolytes and administer replacement therapy as ordered  Outcome: Adequate for Discharge

## 2024-10-14 NOTE — CASE MANAGEMENT
Case Management Discharge Planning Note    Patient name Regino Huffman  Location [address] [place] [facility]/[address] [place] [facility]-* MRN [id number]  : 1950 Date 10/14/2024       Current Admission Date: 10/11/2024  Current Admission Diagnosis:Atrial fibrillation with RVR (HCC)   Patient Active Problem List    Diagnosis Date Noted Date Diagnosed    Atrial fibrillation with RVR (HCC) 10/11/2024     Hypertension 10/11/2024     Hyperlipidemia 10/11/2024     RBBB 10/11/2024     Pre-diabetes 10/11/2024     Elevated brain natriuretic peptide (BNP) level 10/11/2024     Dizziness 2024     Gait disorder 2024     Bilateral shoulder region arthritis 2024     Hoarseness of voice 2024     Umbilical hernia without obstruction and without gangrene 10/30/2023     BMI 26.0-26.9,adult 10/30/2023     Vitamin D insufficiency 2023     Essential hypertension 2023     PAF (paroxysmal atrial fibrillation) (HCC) 2022     Personal history of colonic polyps 2022     Multiple thyroid nodules 2021     Immunization due 2021     History of tobacco abuse 2021     Chronic pain of left ankle 2021     Adrenal adenoma, left 2021     Liver cyst 2021     Colon, diverticulosis 2021     Ureterolithiasis 2021     BPH (benign prostatic hyperplasia)        LOS (days): 3  Geometric Mean LOS (GMLOS) (days): 1.8  Days to GMLOS:-1.1     OBJECTIVE:  Risk of Unplanned Readmission Score: 5.91         Current admission status: Inpatient   Preferred Pharmacy:   [facility] [address] [place] [address] - [address]  [address]  [facility] [address]  Phone: [phone] Fax: [phone]    Primary Care Provider: Blanco Rojo DO    Primary Insurance: [employer] [facility] REP  Secondary Insurance:     DISCHARGE DETAILS:    Other Referral/Resources/Interventions Provided:  Interventions: Prescription Price Check  Referral Comments: Call made to [facility] pharmacy. Confirmed Eliquis copay is  $11.     Treatment Team Recommendation: Home  Discharge Destination Plan:: Home  Transport at Discharge : Family

## 2024-10-14 NOTE — ASSESSMENT & PLAN NOTE
Known to patient.  Seen on echocardiogram this admission.  Present on imaging since 2013  US unchanged, outpatient follow up by PCP or GI referral if desired

## 2024-10-14 NOTE — PLAN OF CARE
Problem: PAIN - ADULT  Goal: Verbalizes/displays adequate comfort level or baseline comfort level  Description: Interventions:  - Encourage patient to monitor pain and request assistance  - Assess pain using appropriate pain scale  - Administer analgesics based on type and severity of pain and evaluate response  - Implement non-pharmacological measures as appropriate and evaluate response  - Consider cultural and social influences on pain and pain management  - Notify physician/advanced practitioner if interventions unsuccessful or patient reports new pain  Outcome: Progressing     Problem: INFECTION - ADULT  Goal: Absence or prevention of progression during hospitalization  Description: INTERVENTIONS:  - Assess and monitor for signs and symptoms of infection  - Monitor lab/diagnostic results  - Monitor all insertion sites, i.e. indwelling lines, tubes, and drains  - Monitor endotracheal if appropriate and nasal secretions for changes in amount and color  - [place] appropriate cooling/warming therapies per order  - Administer medications as ordered  - Instruct and encourage patient and family to use good hand hygiene technique  - Identify and instruct in appropriate isolation precautions for identified infection/condition  Outcome: Progressing  Goal: Absence of fever/infection during neutropenic period  Description: INTERVENTIONS:  - Monitor WBC    Outcome: Progressing     Problem: SAFETY ADULT  Goal: Patient will remain free of falls  Description: INTERVENTIONS:  - Educate patient/family on patient safety including physical limitations  - Instruct patient to call for assistance with activity   - Consult OT/PT to assist with strengthening/mobility   - Keep Call bell within reach  - Keep bed low and locked with side rails adjusted as appropriate  - Keep care items and personal belongings within reach  - Initiate and maintain comfort rounds  - Make Fall Risk Sign visible to staff  - Offer Toileting every 2 Hours,  in advance of need  - Initiate/Maintain bed alarm  - Obtain necessary fall risk management equipment: yellow socks  - Apply yellow socks and bracelet for high fall risk patients  - Consider moving patient to room near nurses station  Outcome: Progressing  Goal: Maintain or return to baseline ADL function  Description: INTERVENTIONS:  -  Assess patient's ability to carry out ADLs; assess patient's baseline for ADL function and identify physical deficits which impact ability to perform ADLs (bathing, care of mouth/teeth, toileting, grooming, dressing, etc.)  - Assess/evaluate cause of self-care deficits   - Assess range of motion  - Assess patient's mobility; develop plan if impaired  - Assess patient's need for assistive devices and provide as appropriate  - Encourage maximum independence but intervene and supervise when necessary  - Involve family in performance of ADLs  - Assess for home care needs following discharge   - Consider OT consult to assist with ADL evaluation and planning for discharge  - Provide patient education as appropriate  Outcome: Progressing  Goal: Maintains/Returns to pre admission functional level  Description: INTERVENTIONS:  - Perform AM-PAC 6 Click Basic Mobility/ Daily Activity assessment daily.  - Set and communicate daily mobility goal to care team and patient/family/caregiver.   - Collaborate with rehabilitation services on mobility goals if consulted  - Perform Range of Motion 4 times a day.  - Reposition patient every 2 hours.  - Dangle patient 3 times a day  - Stand patient 3 times a day  - Ambulate patient 3 times a day  - Out of bed to chair 3 times a day   - Out of bed for meals 3 times a day  - Out of bed for toileting  - Record patient progress and toleration of activity level   Outcome: Progressing     Problem: DISCHARGE PLANNING  Goal: Discharge to home or other facility with appropriate resources  Description: INTERVENTIONS:  - Identify barriers to discharge w/patient and  caregiver  - Arrange for needed discharge resources and transportation as appropriate  - Identify discharge learning needs (meds, wound care, etc.)  - Arrange for interpretive services to assist at discharge as needed  - Refer to Case Management Department for coordinating discharge planning if the patient needs post-hospital services based on physician/advanced practitioner order or complex needs related to functional status, cognitive ability, or social support system  Outcome: Progressing     Problem: Knowledge Deficit  Goal: Patient/family/caregiver demonstrates understanding of disease process, treatment plan, medications, and discharge instructions  Description: Complete learning assessment and assess knowledge base.  Interventions:  - Provide teaching at level of understanding  - Provide teaching via preferred learning methods  Outcome: Progressing     Problem: CARDIOVASCULAR - ADULT  Goal: Maintains optimal cardiac output and hemodynamic stability  Description: INTERVENTIONS:  - Monitor I/O, vital signs and rhythm  - Monitor for S/S and trends of decreased cardiac output  - Administer and titrate ordered vasoactive medications to optimize hemodynamic stability  - Assess quality of pulses, skin color and temperature  - Assess for signs of decreased coronary artery perfusion  - Instruct patient to report change in severity of symptoms  Outcome: Progressing  Goal: Absence of cardiac dysrhythmias or at baseline rhythm  Description: INTERVENTIONS:  - Continuous cardiac monitoring, vital signs, obtain 12 lead EKG if ordered  - Administer antiarrhythmic and heart rate control medications as ordered  - Monitor electrolytes and administer replacement therapy as ordered  Outcome: Progressing

## 2024-10-14 NOTE — DISCHARGE SUMMARY
Discharge Summary - Hospitalist   Name: Regino Huffman 74 y.o. male I MRN: [id number]  Unit/Bed#: 4 [place] 419-01 I Date of Admission: 10/11/2024   Date of Service: 10/14/2024 I Hospital Day: 3         Admitting Provider:  Lewis Srivastava DO  Discharge Provider:  Evan Shepard DO  Admission Date: 10/11/2024       Discharge Date: 10/14/24   LOS: 3  Primary Care Physician at Discharge: Blanco Rojo [phone]    HOSPITAL COURSE:  Regino Huffman is a 74 y.o. male who presented atrial fibrillation with rapid ventricular response.  The patient had been previously hospitalized for COVID hypertension hyperlipidemia and reported worsening shortness of breath.  The patient was urgently evaluated by cardiology, the patient was placed on diltiazem infusion and subsequently converted back to normal sinus rhythm.    He was initiated on Eliquis and had no further episodes of shortness of breath or difficulty breathing.  He did have evidence of heart failure secondary to atrial fibrillation and responded appropriately to diuresis.    He underwent cardiac stress testing prior to discharge and there was no evidence of myocardial ischemia.    At the time of discharge the patient was tolerating oral diet they were without acute complaint and they were medically cleared for discharge.  All questions were answered the patient's satisfaction and they were in agreement with the discharge plan.    DISCHARGE DIAGNOSES  * Atrial fibrillation with RVR (HCC)  Assessment & Plan  History of paroxysmal atrial fibrillation while hospitalized for COVID, hypertension, hyperlipidemia who presented to the hospital for worsening shortness of breath found to have rapid atrial fibrillation  Seen by cardiology.  Eventually converted to sinus rhythm today  Was on diltiazem infusion, given digoxin, and on metoprolol  Anticoagulation: Prescription for Eliquis sent to pharmacy  Cardiology tress test which was negative for ischemia  Continue  metoprolol 50 mg every 12 hours    Diastolic heart failure (HCC)  Assessment & Plan  Wt Readings from Last 3 Encounters:   10/12/24 75.3 kg (166 lb)   09/23/24 72.3 kg (159 lb 6.4 oz)   07/08/24 72.6 kg (160 lb)       Acute diastolic (congestive) heart failure, POA due to a-fib and HTN a/e/b orthopnea, dyspnea on exertion, lower extremity edema, rales in lung bases and elevated BNP treated with Lasix IV, echocardiogram, Cardiology consult, a fib rate control and I&O.         Elevated brain natriuretic peptide (BNP) level  Assessment & Plan  Presentation with shortness of breath found to have elevated BNP  Given one-time dose of furosemide.  Cardiogram: LVEF 50 to 55%    Pre-diabetes  Assessment & Plan  Lab Results   Component Value Date    HGBA1C 5.9 (H) 10/11/2024     Results from last 7 days   Lab Units 10/14/24  0442 10/13/24  1028 10/12/24  0433   GLUCOSE RANDOM mg/dL 98 129 98     Diet controlled    Hyperlipidemia  Assessment & Plan  Started on atorvastatin by cardiology    Hypertension  Assessment & Plan  Prior to admission on metoprolol and amlodipine.  Holding amlodipine as BP appears acceptable on increased dose of metoprolol  Can discuss as an outpatient resuming at a lower dose if desired    Liver cyst  Assessment & Plan  Known to patient.  Seen on echocardiogram this admission.  Present on imaging since 2013  US unchanged, outpatient follow up by PCP or GI referral if desired      RBBB-resolved as of 10/14/2024  Assessment & Plan  Chronic upon review      CONSULTING PROVIDERS   IP CONSULT TO CARDIOLOGY    PROCEDURES PERFORMED  * No surgery found *    RADIOLOGY RESULTS  US right upper quadrant    Result Date: 10/14/2024  Impression: 1. Numerous liver cysts as described above. The largest cyst measures 7.7 cm with thin incomplete septations and appears minimally enlarged from CT of July 2021. The liver is partially obscured due to shadowing and some of the smaller cysts are suboptimally visualized. 2.  "Diffusely echogenic appearance of the liver parenchyma which is most consistent with steatosis. 3. Mild upper pole caliectasis within the right kidney without rigoberto hydronephrosis. Workstation performed: [id number]     XR chest 1 view portable    Result Date: 10/11/2024  Impression: No acute cardiopulmonary disease. Workstation performed: [id number]       LABS  Results from last 7 days   Lab Units 10/13/24  1028 10/12/24  0433 10/11/24  1113   WBC Thousand/uL 9.13 8.34 11.12*   HEMOGLOBIN g/dL 15.4 14.2 15.1   HEMATOCRIT % 47.4 43.6 47.3   MCV fL 99* 99* 100*   PLATELETS Thousands/uL 179 162 188     Results from last 7 days   Lab Units 10/14/24  0442 10/13/24  1028 10/12/24  0433 10/11/24  1113   SODIUM mmol/L 137 139 140 142   POTASSIUM mmol/L 3.8 3.7 3.5 3.8   CHLORIDE mmol/L 106 106 107 109*   CO2 mmol/L 25 23 25 24   BUN mg/dL 20 21 24 25   CREATININE mg/dL 1.01 1.17 1.08 1.14   CALCIUM mg/dL 8.5 8.8 8.4 9.3   ALBUMIN g/dL  --  3.6 3.4* 3.9   TOTAL BILIRUBIN mg/dL  --  0.91 0.91 0.84   ALK PHOS U/L  --  38 37 41   ALT U/L  --  20 20 26   AST U/L  --  13 14 19   EGFR ml/min/1.73sq m 72 61 67 63   GLUCOSE RANDOM mg/dL 98 129 98 89     Results from last 7 days   Lab Units 10/11/24  1306 10/11/24  1113   HS TNI 0HR ng/L  --  9   HS TNI 2HR ng/L 9  --                   Results from last 7 days   Lab Units 10/11/24  1113   HEMOGLOBIN A1C % 5.9*     Results from last 7 days   Lab Units 10/11/24  1113   TSH 3RD GENERATON uIU/mL 0.523               Cultures:         Invalid input(s): \"URIBILINOGEN\"                    PHYSICAL EXAM:  Vitals:   Blood Pressure: 149/99 (10/14/24 1256)  Pulse: 69 (10/14/24 1256)  Temperature: 97.9 °F (36.6 °C) (10/14/24 0748)  Temp Source: Oral (10/11/24 1912)  Respirations: 18 (10/14/24 1256)  Height: 5' 6\" (167.6 cm) (10/12/24 0850)  Weight - Scale: 75.3 kg (166 lb) (10/12/24 0850)  SpO2: 96 % (10/14/24 1256)      General: well appearing, no acute distress  HEENT: atraumatic, PERRLA, moist " mucosa, normal pharynx, normal tonsils and adenoids, normal tongue, no fluid in sinuses  Neck: Trachea midline, no carotid bruit, no masses  Respiratory: normal chest wall expansion, CTA B  Cardiovascular: RRR, no m/r/g, Normal S1 and S2  Abdomen: Soft, non-tender, non-distended, normal bowel sounds in all quadrants, no hepatosplenomegaly, no tympany  Rectal: deferred  Musculoskeletal: Moves all  Integumentary: warm, dry, and pink, with no visible rash, purpura, or petechia  Heme/Lymph: no lymphadenopathy, no bruises  Neurological: Cranial Nerves II-XII grossly intact  Psychiatric: cooperative with normal mood, affect, and cognition       Discharge Disposition: Home/Self Care    AM-PAC Basic Mobility:  Basic Mobility Inpatient Raw Score: 24    JH-HLM Achieved: 7: Walk 25 feet or more  JH-HLM Goal: 8: Walk 250 feet or more    HLM Goal listed above. Continue with ongoing physical therapy and encourage appropriate mobility to improve upon HLM goals.      Test Results Pending at Discharge:           Medications   Summary of Medication Adjustments made as a result of this hospitalization: See discharge summary and AVS for medication changes  Medication Dosing Tapers - Please refer to Discharge Medication List for details on any medication dosing tapers (if applicable to patient).  Discharge Medication List: See after visit summary for reconciled discharge medications.     Diet restrictions:         Diet Orders   (From admission, onward)                 Start     Ordered    10/14/24 0001  Diet Cardiovascular; Stress Test (1 Meal)  Diet effective midnight        Comments: *If patient is having a stress test, no caffeine, decaf, or chocolate is to be given*   References:    Adult Nutrition Support Algorithm    RD Therapeutic Diet Order Protocol   Question Answer Comment   Diet Type Cardiovascular    Cardiac Stress Test (1 Meal)    RD to adjust diet per protocol? Yes        10/13/24 1029    10/11/24 5141  Room Service  Once         Question:  Type of Service  Answer:  Room Service - Appropriate with Assistance    10/11/24 6081                  Activity restrictions: No strenuous activity  Discharge Condition: good    Outpatient Follow-Up and Discharge Instructions  See after visit summary section titled Discharge Instructions for information provided to patient and family.      Code Status: Level 1 - Full Code  Discharge Statement   I spent 86 minutes discharging the patient. This time was spent on the day of discharge. Greater than 50% of total time was spent with the patient and / or family counseling and / or coordination of care.    ** Please Note: This note was completed in part utilizing [employer] Dragon [employer] One Software.  Grammatical errors, random word insertions, spelling mistakes, and incomplete sentences may be an occasional consequence of this system secondary to software limitations, ambient noise, and hardware issues.  If you have any questions or concerns about the content, text, or information contained within the body of this dictation, please contact the provider for clarification.**

## 2024-10-14 NOTE — DISCHARGE INSTR - AVS FIRST PAGE
Dear Regino Huffman,     It was our pleasure to care for you here at [facility].  It is our hope that we were always able to exceed the expected standards for your care during your stay.  You were hospitalized due to atrial fibrillation with rapid ventricular response.  You were cared for on the fourth floor by Evan Shepard DO with the [facility] who covers for your primary care physician (PCP), Blanco Rojo DO, while you were hospitalized.  If you have any questions or concerns related to this hospitalization, you may contact us at .  For follow up as well as any medication refills, we recommend that you follow up with your primary care physician.  A registered nurse will reach out to you by phone within a few days after your discharge to answer any additional questions that you may have after going home.  However, at this time we provide for you here, the most important instructions / recommendations at discharge:     Notable Medication Adjustments -   Toprol all 50 mg every 12 hours  Eliquis which is a blood thinner  Testing Required after Discharge -   None  Important follow up information -   PCP follow-up in 1 week  Cardiology follow-up in 1 to 2 months  Other Instructions -   You have been discharged on Eliquis  -If you have any unexplained bleed, worst headache of your life, strike your head or any other body part in any manner, you need to go the Emergency room.   Please do not participate in any high risk or contact sports while you are on a blood thinner.  Please review this entire after visit summary as additional general instructions including medication list, appointments, activity, diet, any pertinent wound care, and other additional recommendations from your care team that may be provided for you.      Sincerely,     Evan Shepard DO and Nurse [place]

## 2024-10-14 NOTE — ASSESSMENT & PLAN NOTE
Wt Readings from Last 3 Encounters:   10/12/24 75.3 kg (166 lb)   09/23/24 72.3 kg (159 lb 6.4 oz)   07/08/24 72.6 kg (160 lb)       Acute diastolic (congestive) heart failure, POA due to a-fib and HTN a/e/b orthopnea, dyspnea on exertion, lower extremity edema, rales in lung bases and elevated BNP treated with Lasix IV, echocardiogram, Cardiology consult, a fib rate control and I&O.

## 2024-10-14 NOTE — ASSESSMENT & PLAN NOTE
Patient with documented history of paroxysmal atrial fibrillation in June 2022 while hospitalized for COVID-19.  10/11/24 EKG: Atrial fibrillation with rapid ventricular response, 167 bpm.  RBBB.  Patient converted from atrial fibrillation to sinus rhythm on morning of 10/13/24. 10/12-10/13 Telemetry reviewed: Currently sinus rhythm with PVCs, 66 bpm.  Converted from atrial fibrillation to sinus rhythm at 0617 hrs.   Currently on Lopressor 25 mg 3 times daily.  Will switch to Lopressor 50 mg twice daily.  [id number] stroke risk score: 2 points, moderate to high risk.  Currently on Eliquis 5 mg twice daily.  12/23/22 TTE: LVEF 60%.  Diastolic function normal for age.  Mitral valve with trace regurgitation. Tricuspid valve trace regurgitation.  Plan for nuclear stress test on 10/14/24.  10/13/24 overnight pulse oximetry: AHI 39, suspected pathological breathing disorder.  Respiratory therapist does report that patient woke up multiple times during the study to use the bathroom.  Would recommend formal outpatient sleep study to rule out obstructive sleep apnea.  Discussed with patient and daughter on 10/13/24 possibility of antiarrhythmics.  They both reported that they would like to hold off on antiarrhythmic at this time and would consider starting antiarrhythmic if he has a recurrent episode of atrial fibrillation.

## 2024-10-14 NOTE — ASSESSMENT & PLAN NOTE
BP currently acceptable.   Currently on Lopressor 25 mg 3 times daily.  Will switch to Lopressor 50 mg twice daily.  Continue to monitor BP.

## 2024-10-14 NOTE — ASSESSMENT & PLAN NOTE
History of paroxysmal atrial fibrillation while hospitalized for COVID, hypertension, hyperlipidemia who presented to the hospital for worsening shortness of breath found to have rapid atrial fibrillation  Seen by cardiology.  Eventually converted to sinus rhythm today  Was on diltiazem infusion, given digoxin, and on metoprolol  Anticoagulation: Prescription for Eliquis sent to pharmacy  Cardiology tress test which was negative for ischemia  Continue metoprolol 50 mg every 12 hours

## 2024-10-14 NOTE — PROGRESS NOTES
Progress Note - Cardiology   [facility]     Regino Huffman 74 y.o. male MRN: [id number]  : 1950  Unit/Bed#: 4 [place] [address]-01 Encounter: [id number]    Assessment and Plan:     Assessment & Plan  Atrial fibrillation with RVR (HCC)  Patient with documented history of paroxysmal atrial fibrillation in 2022 while hospitalized for COVID-19.  10/11/24 EKG: Atrial fibrillation with rapid ventricular response, 167 bpm.  RBBB.  Patient converted from atrial fibrillation to sinus rhythm on morning of 10/13/24. 10/12-10/13 Telemetry reviewed: Currently sinus rhythm with PVCs, 66 bpm.  Converted from atrial fibrillation to sinus rhythm at 0617 hrs.   Currently on Lopressor 25 mg 3 times daily.  Will switch to Lopressor 50 mg twice daily.  [id number] stroke risk score: 2 points, moderate to high risk.  Currently on Eliquis 5 mg twice daily.  22 TTE: LVEF 60%.  Diastolic function normal for age.  Mitral valve with trace regurgitation. Tricuspid valve trace regurgitation.  Plan for nuclear stress test on 10/14/24.  10/13/24 overnight pulse oximetry: AHI 39, suspected pathological breathing disorder.  Respiratory therapist does report that patient woke up multiple times during the study to use the bathroom.  Would recommend formal outpatient sleep study to rule out obstructive sleep apnea.  Discussed with patient and daughter on 10/13/24 possibility of antiarrhythmics.  They both reported that they would like to hold off on antiarrhythmic at this time and would consider starting antiarrhythmic if he has a recurrent episode of atrial fibrillation.  Elevated brain natriuretic peptide (BNP) level  10/11/24 BNP: 940.   10/11/24 CXR: No acute cardiopulmonary disease.   Received one-time dose of Lasix 40 mg IV on 10/11/24.   10/12/24 TTE: LVEF 50 to 55%.  Unable to assess diastolic function due to atrial fibrillation.  Bilateral atrium mildly dilated.  Aortic valve sclerosis.  At least mild  "mitral regurgitation.  Mild tricuspid valve regurgitation.  Calculated pulmonary artery pressure 35 to 40 mmHg.  Monitor I/Os.   Hypertension  BP currently acceptable.   Currently on Lopressor 25 mg 3 times daily.  Will switch to Lopressor 50 mg twice daily.  Continue to monitor BP.  Hyperlipidemia  6/26/24 lipid panel: Cholesterol 233, triglycerides 192, HDL 74, .  ASCVD 10-year risk score 21.8%, high risk.  Continue Lipitor 20 mg daily.  RBBB  Chronic, noted on EKG since 2022.  Pre-diabetes  10/11/24 HgbA1c: 5.9.   Care per primary team.     Subjective / Objective:         Patient maintaining sinus rhythm on telemetry.  Scheduled for nuclear stress test today.  Patient offers no complaints this morning, he states he feels good.  Patient denies experiencing chest pain, palpitations, shortness of breath, lightheadedness, dizziness, headache and nausea, vomiting.    Vitals: Blood pressure 146/97, pulse 67, temperature 97.9 °F (36.6 °C), resp. rate 16, height 5' 6\" (1.676 m), weight 75.3 kg (166 lb), SpO2 93%.  Vitals:    10/11/24 1502 10/12/24 0850   Weight: 75.7 kg (166 lb 12.8 oz) 75.3 kg (166 lb)     Body mass index is 26.79 kg/m².  BP Readings from Last 3 Encounters:   10/14/24 146/97   09/23/24 136/88   07/08/24 136/74     Orthostatic Blood Pressures      Flowsheet Row Most Recent Value   Blood Pressure 146/97 filed at 10/14/2024 0748   Patient Position - Orthostatic VS Lying filed at 10/11/2024 1215          I/O         10/10 0701  10/11 0700 10/11 0701  10/12 0700 10/12 0701  10/13 0700    Urine (mL/kg/hr)  1475     Total Output  1475     Net  -1475                  Invasive Devices       Peripheral Intravenous Line  Duration             Peripheral IV 10/11/24 Left Antecubital 2 days    Peripheral IV 10/11/24 Left Wrist 2 days                      Intake/Output Summary (Last 24 hours) at 10/14/2024 0910  Last data filed at 10/14/2024 0443  Gross per 24 hour   Intake --   Output 900 ml   Net -900 ml "         Physical Exam:   Physical Exam  Vitals reviewed.   Constitutional:       General: He is not in acute distress.  Cardiovascular:      Rate and Rhythm: Normal rate and regular rhythm.      Pulses: Normal pulses.      Heart sounds: No murmur heard.  Pulmonary:      Effort: Pulmonary effort is normal. No respiratory distress.      Breath sounds: Normal breath sounds.   Abdominal:      General: Abdomen is flat. There is no distension.      Palpations: Abdomen is soft.      Tenderness: There is no abdominal tenderness.   Musculoskeletal:      Right lower leg: No edema.      Left lower leg: No edema.   Skin:     General: Skin is warm and dry.   Neurological:      Mental Status: He is alert and oriented to person, place, and time.       Medications/ Allergies:     Current Facility-Administered Medications   Medication Dose Route Frequency Provider Last Rate    acetaminophen  650 mg Oral Q4H PRN Lewis Srivastava DO      apixaban  5 mg Oral BID KYMBERLY Marc-PHILIPPE      atorvastatin  20 mg Oral Daily With Dinner KYMBERLY Marc-PHILIPPE      metoprolol tartrate  50 mg Oral Q12H [facility] Marisol Price PA-C      ondansetron  4 mg Intravenous Q4H PRN Lewis Srivastava, DO       acetaminophen, 650 mg, Q4H PRN  ondansetron, 4 mg, Q4H PRN      No Known Allergies    VTE Pharmacologic Prophylaxis:   Eliquis    Labs:   Troponins:  Results from last 7 days   Lab Units 10/11/24  1306   HSTNI D2 ng/L 0         CBC with diff:  Results from last 7 days   Lab Units 10/13/24  1028 10/12/24  0433 10/11/24  1113   WBC Thousand/uL 9.13 8.34 11.12*   HEMOGLOBIN g/dL 15.4 14.2 15.1   HEMATOCRIT % 47.4 43.6 47.3   MCV fL 99* 99* 100*   PLATELETS Thousands/uL 179 162 188   RBC Million/uL 4.80 4.41 4.73   MCH pg 32.1 32.2 31.9   MCHC g/dL 32.5 32.6 31.9   RDW % 14.4 14.5 14.6   MPV fL 8.9 9.0 9.0   NRBC AUTO /100 WBCs  --   --  0       CMP:  Results from last 7 days   Lab Units 10/14/24  0442 10/13/24  1028 10/12/24  0433 10/11/24  1113   SODIUM mmol/L 137 139  140 142   POTASSIUM mmol/L 3.8 3.7 3.5 3.8   CHLORIDE mmol/L 106 106 107 109*   CO2 mmol/L 25 23 25 24   ANION GAP mmol/L 6 10 8 9   BUN mg/dL 20 21 24 25   CREATININE mg/dL 1.01 1.17 1.08 1.14   CALCIUM mg/dL 8.5 8.8 8.4 9.3   AST U/L  --  13 14 19   ALT U/L  --  20 20 26   ALK PHOS U/L  --  38 37 41   TOTAL PROTEIN g/dL  --  5.9* 5.4* 6.2*   ALBUMIN g/dL  --  3.6 3.4* 3.9   TOTAL BILIRUBIN mg/dL  --  0.91 0.91 0.84   EGFR ml/min/1.73sq m 72 61 67 63       Magnesium:  Results from last 7 days   Lab Units 10/13/24  1028 10/11/24  1113   MAGNESIUM mg/dL 2.0 2.2     TSH:  Results from last 7 days   Lab Units 10/11/24  1113   TSH 3RD GENERATON uIU/mL 0.523     Hgb A1c:  Results from last 7 days   Lab Units 10/11/24  1113   HEMOGLOBIN A1C % 5.9*       Imaging & Testing   I have personally reviewed pertinent reports.    XR chest 1 view portable    Result Date: 10/11/2024    Impression: No acute cardiopulmonary disease.      EKG / Monitor: Personally reviewed.      Telemetry reviewed: Currently sinus rhythm, 68 bpm.    Cardiac testing:   Exercise stress test  Result date: 8/24/23       The stress ECG is negative for ischemia after maximal exercise without chest pain but with significant fatigue requiring stopping the treadmill after 1 minute and 31 seconds.  Patient's exercise capacity is severely diminished.  Because of baseline RBBB and decreased exercise capacity, specificity of the study is limited.  If clinically indicated, a pharmacologic nuclear stress test may be performed.         Resting ECG     Resting ECG ECG is abnormal. The ECG shows normal sinus rhythm. There are nonspecific ST and T changes. ECG demonstrates right bundle branch block.   Stress Findings     Stress Findings A Maicol protocol stress test was performed. Lungs, no wheezing auscultated. The patient reached stage 1.0 of the protocol after exercising for 1 min and 31 sec and had a maximal HR of 137 bpm (93 % of MPHR) and 4.6 METS. The patient  experienced no angina during the test. The test was stopped because the patient experienced fatigue and dyspnea. The patient reported dyspnea and fatigue during the stress test. Onset of symptoms occurred at stage 1 of the protocol. Symptoms ended during recovery. Blood pressure demonstrated a normal response and heart rate demonstrated a normal response to stress. The patient's heart rate recovery was normal.   Stress ECG     Stress ECG No significant ST segment changes from baseline There were no arrhythmias during stress. There were no arrhythmias during recovery. . The ECG was negative for ischemia. The stress ECG is negative for ischemia after maximal exercise without chest pain but with significant fatigue requiring stopping the treadmill after 1 minute and 31 seconds.  Patient's exercise capacity is severely diminished.  Because of baseline RBBB and decreased exercise capacity, specificity of the study is limited.  If clinically indicated, a pharmacologic nuclear stress test may be performed.         Echo   Result date: 12/23/22           Left Ventricle Left ventricular cavity size is normal. Wall thickness is normal. The left ventricular ejection fraction is 60% by visual estimation. Systolic function is normal.  Wall motion is normal. Diastolic function is normal for age.  Left atrial filling pressure is normal.   Right Ventricle Right ventricular cavity size is normal. Systolic function is normal. Wall thickness is normal.   Left Atrium The atrium is normal in size (16-34 mL/m2).   Right Atrium The atrium is normal in size.   Aortic Valve The aortic valve is trileaflet. The leaflets are not thickened. The leaflets are moderately calcified. There is mildly reduced mobility. There is no evidence of regurgitation. The aortic valve has no significant stenosis.   Mitral Valve Mitral valve structure is normal. There is trace regurgitation. There is no evidence of stenosis.   Tricuspid Valve Tricuspid valve  structure is normal. There is trace regurgitation. There is no evidence of stenosis. The right ventricular systolic pressure is normal. The estimated right ventricular systolic pressure is 22.00 mmHg.   Pulmonic Valve Pulmonic valve structure is normal. There is trace regurgitation. There is no evidence of stenosis.   Ascending Aorta The aortic root is normal in size.   IVC/SVC The right atrial pressure is estimated at 8.0 mmHg. The inferior vena cava is normal in size.   Pericardium There is no pericardial effusion. The pericardium is normal in appearance.           Marisol Price PA-C

## 2024-10-15 ENCOUNTER — TELEPHONE (OUTPATIENT)
Dept: FAMILY MEDICINE CLINIC | Facility: CLINIC | Age: 74
End: 2024-10-15

## 2024-10-15 ENCOUNTER — TRANSITIONAL CARE MANAGEMENT (OUTPATIENT)
Dept: FAMILY MEDICINE CLINIC | Facility: CLINIC | Age: 74
End: 2024-10-15

## 2024-10-15 NOTE — UTILIZATION REVIEW
NOTIFICATION OF ADMISSION DISCHARGE   This is a Notification of Discharge from [facility]. Please be advised that this patient has been discharge from our facility. Below you will find the admission and discharge date and time including the patient’s disposition.   UTILIZATION REVIEW CONTACT:  María Paz  Utilization   Network Utilization Review Department  Phone: [phone] x carefully listen to the prompts. All voicemails are confidential.  Email: [email or web address]     ADMISSION INFORMATION  PRESENTATION DATE: 10/11/2024 10:58 AM  OBERVATION ADMISSION DATE: N/A  INPATIENT ADMISSION DATE: 10/11/24  2:06 PM   DISCHARGE DATE: 10/14/2024  4:30 PM   DISPOSITION:Home/Self Care    Network Utilization Review Department  ATTENTION: Please call with any questions or concerns to [phone] and carefully listen to the prompts so that you are directed to the right person. All voicemails are confidential.   For Discharge needs, contact Care Management DC Support Team at [phone] opt. [phone]  Send all requests for admission clinical reviews, approved or denied determinations and any other requests to dedicated fax number below belonging to the campus where the patient is receiving treatment. List of dedicated fax numbers for the Facilities:  FACILITY NAME UR FAX NUMBER   ADMISSION DENIALS (Administrative/Medical Necessity) [phone]   DISCHARGE SUPPORT TEAM ([facility]) [phone]   PARENT CHILD HEALTH (Maternity/NICU/Pediatrics) [phone]   [facility] [phone]   [facility] [phone]   [facility] [phone]   [facility] [phone]   [facility] [phone]   [facility] [phone]   [facility] [phone]   [facility]  [phone]   [facility] [phone]   [facility] [phone]   [facility] [phone]   [facility] [phone]

## 2024-10-15 NOTE — TELEPHONE ENCOUNTER
First attempt to reach Regino Message left to call back on 10/15/24. He made a Mychart appt for his hospital follow up which I need to change since it is only a 15 min appt. I also want to review his discharge instructions Kelsey

## 2024-10-15 NOTE — TELEPHONE ENCOUNTER
----- Message from Evan Shepard DO sent at 10/14/2024  2:47 PM EDT -----  Thank you for allowing us to participate in the care of your patient, Regino Huffman, who was hospitalized from 10/11/2024 through 10/14/2024 with the admitting diagnosis of atrial fibrillation with rapid ventricular response.  Subsequently went back in her normal sinus rhythm.  He was increased on metoprolol to 50 mg twice a day and discharged on Eliquis    Medication Changes:  Metoprolol 50 mg twice  Eliquis    Outpatient testing recommended:  None    If you have any additional questions or would like to discuss further, please feel free to contact me.    Evan Shepard DO  [facility] Internal Medicine, Hospitalist  [phone]

## 2024-10-16 ENCOUNTER — TELEPHONE (OUTPATIENT)
Dept: FAMILY MEDICINE CLINIC | Facility: CLINIC | Age: 74
End: 2024-10-16

## 2024-10-16 NOTE — TELEPHONE ENCOUNTER
Spoke with patients daughter and made TCM appt with Dr Rojo 10/23/24. Please call for TCM questions.

## 2024-10-17 ENCOUNTER — OFFICE VISIT (OUTPATIENT)
Facility: CLINIC | Age: 74
End: 2024-10-17
Payer: COMMERCIAL

## 2024-10-17 DIAGNOSIS — R42 DIZZINESS: ICD-10-CM

## 2024-10-17 DIAGNOSIS — R26.9 GAIT DISORDER: ICD-10-CM

## 2024-10-17 DIAGNOSIS — R26.89 IMBALANCE: Primary | ICD-10-CM

## 2024-10-17 PROCEDURE — 97530 THERAPEUTIC ACTIVITIES: CPT

## 2024-10-17 NOTE — PROGRESS NOTES
"Daily Note     Today's date: 10/17/2024  Patient name: Regino Huffman  : 1950  MRN: [id number]  Referring provider: Blanco Rojo DO  Dx:   Encounter Diagnosis     ICD-10-CM    1. Imbalance  R26.89       2. Dizziness  R42       3. Gait disorder  R26.9             POC expires Unit limit Auth Expiration date PT/OT + Visit Limit? Co-Insurance   24 16 v 10/1-  No and $10 Co-pay                               Visit/Unit Tracking  AUTH Status:  Date 10/1 10/3 10/8 10/10 10/16          16 visits  Used 1 1 1 1 1           Remaining  15 14 13 12 11              PCP    Neurologist/Last Seen    ENT/Last Seen    Psych/Last Seen    OT    Imaging        Subjective: Patient presents to PT following ED visit this past weekend due to Afib, this PT contact cardiologist regarding precaution with PT participation. As per Dr. Hernandez on 10/15/24: \"Hi - Just looked at his chart. Looks like he converted spontaneously. No restrictions\"      Objective: See treatment diary below    TA:     Vitals:   HR: 60 bpm  SpO2: 90%   BP: 144/111 mmHg given water and 5 min break, 140/91 mmHg    - Pt Education: Educated patient on high blood pressure reading and how it can be attributed to delay response to blood pressure medication taken this morning. Discussed red flag symptoms and advised patient seek higher medical care should they arise. Patient in good verbal understanding and agreeability.     Assessment: Held formal PT session this date in lieu of recent Afib and high blood pressure reading this morning. Discussed this with patient who was in agreement. Educated on red flag symptoms and importance of monitoring symptoms along with BP. Advised patient seek higher medical care should red flag symptoms arise.     Plan: Continue per plan of care.        Outcome Measures Initial Eval  10/1/24   5xSTS 15.94 sec   10 meter 10.03 ft/sec  1.00 m/s   FGA 15/30   DGI    mCTSIB  - FTEO (firm)  - FTEC (firm)  - FTEO (foam)  - FTEC " (foam)   30 sec  30 sec +  30 sec +  2 sec   6MWT defer

## 2024-10-20 PROBLEM — E78.49 OTHER HYPERLIPIDEMIA: Status: ACTIVE | Noted: 2024-10-11

## 2024-10-20 PROBLEM — I10 HYPERTENSION: Chronic | Status: RESOLVED | Noted: 2024-10-11 | Resolved: 2024-10-20

## 2024-10-20 PROBLEM — R79.89 ELEVATED BRAIN NATRIURETIC PEPTIDE (BNP) LEVEL: Status: RESOLVED | Noted: 2024-10-11 | Resolved: 2024-10-20

## 2024-10-22 ENCOUNTER — OFFICE VISIT (OUTPATIENT)
Facility: CLINIC | Age: 74
End: 2024-10-22
Payer: COMMERCIAL

## 2024-10-22 DIAGNOSIS — R26.9 GAIT DISORDER: ICD-10-CM

## 2024-10-22 DIAGNOSIS — R26.89 IMBALANCE: ICD-10-CM

## 2024-10-22 DIAGNOSIS — R42 DIZZINESS: Primary | ICD-10-CM

## 2024-10-22 PROCEDURE — 97530 THERAPEUTIC ACTIVITIES: CPT

## 2024-10-22 NOTE — PROGRESS NOTES
"Daily Note     Today's date: 10/22/2024  Patient name: Regino Huffman  : 1950  MRN: [id number]  Referring provider: Blanco Rojo DO  Dx:   Encounter Diagnosis     ICD-10-CM    1. Dizziness  R42       2. Gait disorder  R26.9       3. Imbalance  R26.89             POC expires Unit limit Auth Expiration date PT/OT + Visit Limit? Co-Insurance   24 16 v 10/1-  No and $10 Co-pay                               Visit/Unit Tracking  AUTH Status:  Date 10/1 10/3 10/8 10/10 10/16 10/22         16 visits  Used 1 1 1 1 1 1          Remaining  15 14 13 12 11 10             PCP    Neurologist/Last Seen    ENT/Last Seen    Psych/Last Seen    OT    Imaging        Subjective: Patient presents to PT stating that he has a cardio appt in two days. Asymptomatic.     As per Dr. Hernandez on 10/15/24: \"Hi - Just looked at his chart. Looks like he converted spontaneously. No restrictions\"    Objective: See treatment diary below    TA:     Vitals:   HR: 76 bpm  SpO2: 99%   BP: 172/123 mmHg after 5 min break, 179/112 mmHg, after additional 5 min break, after 10-15 min, BP: 182/115 mmHg      Per [employer] PT: >180/110 mmHg at rest, or >250/115 mmHg with activity, need to stop activity and re-assess after 5 minutes.      - Pt Education: Educated patient on high blood pressure reading and how it can be attributed to delay response to blood pressure medication taken this morning. Discussed red flag symptoms and advised patient seek higher medical care should they arise. Patient in good verbal understanding and agreeability.     Assessment: Held formal PT session this date in lieu of high blood pressure reading this morning. Educated patient on high BP, red flags and seek higher medical assistance if he has a change in symptoms; he was in understanding and agreement. Contacted cardiologist to inform of BP readings. Follow up with cardio in two days.   Plan: Continue per plan of care.        Outcome Measures Initial " Eval  10/1/24   5xSTS 15.94 sec   10 meter 10.03 ft/sec  1.00 m/s   FGA 15/30   DGI 13/24   mCTSIB  - FTEO (firm)  - FTEC (firm)  - FTEO (foam)  - FTEC (foam)   30 sec  30 sec +  30 sec +  2 sec   6MWT defer

## 2024-10-23 ENCOUNTER — OFFICE VISIT (OUTPATIENT)
Dept: FAMILY MEDICINE CLINIC | Facility: CLINIC | Age: 74
End: 2024-10-23
Payer: COMMERCIAL

## 2024-10-23 VITALS
RESPIRATION RATE: 18 BRPM | HEIGHT: 66 IN | HEART RATE: 64 BPM | TEMPERATURE: 97.4 F | OXYGEN SATURATION: 98 % | BODY MASS INDEX: 26.2 KG/M2 | WEIGHT: 163 LBS | SYSTOLIC BLOOD PRESSURE: 134 MMHG | DIASTOLIC BLOOD PRESSURE: 80 MMHG

## 2024-10-23 DIAGNOSIS — I48.0 PAF (PAROXYSMAL ATRIAL FIBRILLATION) (HCC): Primary | ICD-10-CM

## 2024-10-23 DIAGNOSIS — E78.49 OTHER HYPERLIPIDEMIA: ICD-10-CM

## 2024-10-23 DIAGNOSIS — I48.91 ATRIAL FIBRILLATION WITH RVR (HCC): ICD-10-CM

## 2024-10-23 DIAGNOSIS — I48.91 ATRIAL FIBRILLATION WITH RAPID VENTRICULAR RESPONSE (HCC): ICD-10-CM

## 2024-10-23 PROCEDURE — 99495 TRANSJ CARE MGMT MOD F2F 14D: CPT | Performed by: FAMILY MEDICINE

## 2024-10-23 RX ORDER — METOPROLOL TARTRATE 50 MG
50 TABLET ORAL EVERY 12 HOURS SCHEDULED
Qty: 180 TABLET | Refills: 1 | Status: SHIPPED | OUTPATIENT
Start: 2024-10-23

## 2024-10-23 RX ORDER — ATORVASTATIN CALCIUM 20 MG/1
20 TABLET, FILM COATED ORAL
Qty: 90 TABLET | Refills: 1 | Status: SHIPPED | OUTPATIENT
Start: 2024-10-23

## 2024-10-23 NOTE — PATIENT INSTRUCTIONS
Since the hospital you should not take amlodipine 5mg once a day anymore but increase the metoprolol 50mg once a day to twice a day.  You can exercise since your stress test was normal.  Also make sure you follow up with your cardiologist Dr Hernandez.    Since you are on a blood thinner Eliquis, you can take tylenol if needed but do not take ibuprofen (advil, motrin) or naproxen (aleve) since it can cause excessive bleeding.

## 2024-10-24 ENCOUNTER — OFFICE VISIT (OUTPATIENT)
Facility: CLINIC | Age: 74
End: 2024-10-24
Payer: COMMERCIAL

## 2024-10-24 DIAGNOSIS — R26.9 GAIT DISORDER: ICD-10-CM

## 2024-10-24 DIAGNOSIS — R26.89 IMBALANCE: ICD-10-CM

## 2024-10-24 DIAGNOSIS — R42 DIZZINESS: Primary | ICD-10-CM

## 2024-10-24 PROCEDURE — 97112 NEUROMUSCULAR REEDUCATION: CPT

## 2024-10-24 PROCEDURE — 97530 THERAPEUTIC ACTIVITIES: CPT

## 2024-10-24 NOTE — PROGRESS NOTES
"Daily Note     Today's date: 10/24/2024  Patient name: Regino Huffman  : 1950  MRN: [id number]  Referring provider: Blanco Rojo DO  Dx:   Encounter Diagnosis     ICD-10-CM    1. Dizziness  R42       2. Gait disorder  R26.9       3. Imbalance  R26.89               POC expires Unit limit Auth Expiration date PT/OT + Visit Limit? Co-Insurance   24 16 v 10/1-  No and $10 Co-pay                               Visit/Unit Tracking  AUTH Status:  Date 10/1 10/3 10/8 10/10 10/16 10/22 10/24        16 visits  Used 1 1 1 1 1 1 1         Remaining  15 14 13 12 11 10 9            PCP    Neurologist/Last Seen    ENT/Last Seen    Psych/Last Seen    OT    Imaging        Subjective: Patient presents to PT stating he went to his PCP yesterday who stated he is okay to participate with exercise. He states he went for a walk in the park.    As per Dr. Hernandez on 10/15/24: \"Hi - Just looked at his chart. Looks like he converted spontaneously. No restrictions\"    Objective: See treatment diary below    TA:   Vitals:   HR: 62 bpm  SpO2: 99%   BP: 158/101 mmHg after 5 min break, 160/98 mmHg     Per Academy of Neu[employer]ic PT: >180/110 mmHg at rest, or >250/115 mmHg with activity, need to stop activity and re-assess after 5 minutes.      - Step-ups to foam pad w/ HT/HN: 10x each, 2 sets  - Step taps to large RR: 15x  - BiodCoshared Trainer   LoS, medium, 0UE: 6%    Assessment: Patient tolerated treatment session well with focus on light exercise and balance program. Patient with initial high BP which improved following seated rest break thus, continued with light balance based exercises. Provided patient with ample seated rest breaks to assure appropriate recovery following activity. Reminded patient of red flag symptoms and advised he seek higher medical attention should they arise; he was in good verbal understanding. He displayed increased A-P sway while on compliant surface suggesting limitations in somatosensory awareness " which is also reflected in Biodex  score. He will benefit from continues skilled OPPT services to maximize function and improve independence.     Plan: Continue per plan of care.        Outcome Measures Initial Eval  10/1/24   5xSTS 15.94 sec   10 meter 10.03 ft/sec  1.00 m/s   FGA 15/30   DGI 13/24   mCTSIB  - FTEO (firm)  - FTEC (firm)  - FTEO (foam)  - FTEC (foam)   30 sec  30 sec +  30 sec +  2 sec   6MWT defer

## 2024-10-25 ENCOUNTER — OFFICE VISIT (OUTPATIENT)
Dept: FAMILY MEDICINE CLINIC | Facility: CLINIC | Age: 74
End: 2024-10-25
Payer: COMMERCIAL

## 2024-10-25 VITALS
DIASTOLIC BLOOD PRESSURE: 72 MMHG | HEIGHT: 66 IN | RESPIRATION RATE: 18 BRPM | WEIGHT: 162 LBS | TEMPERATURE: 97.6 F | SYSTOLIC BLOOD PRESSURE: 122 MMHG | OXYGEN SATURATION: 98 % | HEART RATE: 66 BPM | BODY MASS INDEX: 26.03 KG/M2

## 2024-10-25 DIAGNOSIS — Z00.00 MEDICARE ANNUAL WELLNESS VISIT, SUBSEQUENT: Primary | ICD-10-CM

## 2024-10-25 DIAGNOSIS — M26.621 ARTHRALGIA OF RIGHT TEMPOROMANDIBULAR JOINT: ICD-10-CM

## 2024-10-25 DIAGNOSIS — I10 ESSENTIAL HYPERTENSION: ICD-10-CM

## 2024-10-25 DIAGNOSIS — I48.0 PAF (PAROXYSMAL ATRIAL FIBRILLATION) (HCC): ICD-10-CM

## 2024-10-25 PROCEDURE — G0439 PPPS, SUBSEQ VISIT: HCPCS | Performed by: FAMILY MEDICINE

## 2024-10-25 PROCEDURE — 99214 OFFICE O/P EST MOD 30 MIN: CPT | Performed by: FAMILY MEDICINE

## 2024-10-25 RX ORDER — METHYLPREDNISOLONE 4 MG/1
TABLET ORAL
Qty: 21 TABLET | Refills: 0 | Status: SHIPPED | OUTPATIENT
Start: 2024-10-25 | End: 2024-10-31

## 2024-10-25 NOTE — PATIENT INSTRUCTIONS
Please always remember to avoid ibuprofen, aspirin and naproxen when you take Eliquis since it may cause excessive bleeding.    Tylenol is ok with eliquis.    To treat the inflammation of your right jaw (TMJ area) we can use steroid pills which are ok with Eliquis.  Please take the steroid pills as directed but also use ice on the area three times a day for 20-30 minutes and don't eat food that is hard to difficult to chew.  If this does not help, you may need to see a dentist.  Medicare Preventive Visit Patient Instructions  Thank you for completing your Welcome to Medicare Visit or Medicare Annual Wellness Visit today. Your next wellness visit will be due in one year (10/27/2025).  The screening/preventive services that you may require over the next 5-10 years are detailed below. Some tests may not apply to you based off risk factors and/or age. Screening tests ordered at today's visit but not completed yet may show as past due. Also, please note that scanned in results may not display below.  Preventive Screenings:  Service Recommendations Previous Testing/Comments   Colorectal Cancer Screening  Colonoscopy    Fecal Occult Blood Test (FOBT)/Fecal Immunochemical Test (FIT)  Fecal DNA/Cologuard Test  Flexible Sigmoidoscopy Age: 45-75 years old   Colonoscopy: every 10 years (May be performed more frequently if at higher risk)  OR  FOBT/FIT: every 1 year  OR  Cologuard: every 3 years  OR  Sigmoidoscopy: every 5 years  Screening may be recommended earlier than age 45 if at higher risk for colorectal cancer. Also, an individualized decision between you and your healthcare provider will decide whether screening between the ages of 76-85 would be appropriate. Colonoscopy: 09/12/2022  FOBT/FIT: Not on file  Cologuard: Not on file  Sigmoidoscopy: Not on file          Prostate Cancer Screening Individualized decision between patient and health care provider in men between ages of 55-69   Medicare will cover every 12 months  beginning on the day after your 50th birthday PSA: 1.978 ng/mL           Hepatitis C Screening Once for adults born between 1945 and 1965  More frequently in patients at high risk for Hepatitis C Hep C Antibody: 03/08/2022        Diabetes Screening 1-2 times per year if you're at risk for diabetes or have pre-diabetes Fasting glucose: 103 mg/dL (6/26/2024)  A1C: 5.9 % (10/11/2024)      Cholesterol Screening Once every 5 years if you don't have a lipid disorder. May order more often based on risk factors. Lipid panel: 06/26/2024         Other Preventive Screenings Covered by Medicare:  Abdominal Aortic Aneurysm (AAA) Screening: covered once if your at risk. You're considered to be at risk if you have a family history of AAA or a male between the age of 65-75 who smoking at least 100 cigarettes in your lifetime.  Lung Cancer Screening: covers low dose CT scan once per year if you meet all of the following conditions: (1) Age 55-77; (2) No signs or symptoms of lung cancer; (3) Current smoker or have quit smoking within the last 15 years; (4) You have a tobacco smoking history of at least 20 pack years (packs per day x number of years you smoked); (5) You get a written order from a healthcare provider.  Glaucoma Screening: covered annually if you're considered high risk: (1) You have diabetes OR (2) Family history of glaucoma OR (3)  aged 50 and older OR (4)  American aged 65 and older  Osteoporosis Screening: covered every 2 years if you meet one of the following conditions: (1) Have a vertebral abnormality; (2) On glucocorticoid therapy for more than 3 months; (3) Have primary hyperparathyroidism; (4) On osteoporosis medications and need to assess response to drug therapy.  HIV Screening: covered annually if you're between the age of 15-65. Also covered annually if you are younger than 15 and older than 65 with risk factors for HIV infection. For pregnant patients, it is covered up to 3 times  per pregnancy.    Immunizations:  Immunization Recommendations   Influenza Vaccine Annual influenza vaccination during flu season is recommended for all persons aged >= 6 months who do not have contraindications   Pneumococcal Vaccine   * Pneumococcal conjugate vaccine = PCV13 (Prevnar 13), PCV15 (Vaxneuvance), PCV20 (Prevnar 20)  * Pneumococcal polysaccharide vaccine = PPSV23 (Pneumovax) Adults 19-65 yo with certain risk factors or if 65+ yo  If never received any pneumonia vaccine: recommend Prevnar 20 (PCV20)  Give PCV20 if previously received 1 dose of PCV13 or PPSV23   Hepatitis B Vaccine 3 dose series if at intermediate or high risk (ex: diabetes, end stage renal disease, liver disease)   Respiratory syncytial virus (RSV) Vaccine - COVERED BY MEDICARE PART D  * RSVPreF3 (Arexvy) CDC recommends that adults 60 years of age and older may receive a single dose of RSV vaccine using shared clinical decision-making (SCDM)   Tetanus (Td) Vaccine - COST NOT COVERED BY MEDICARE PART B Following completion of primary series, a booster dose should be given every 10 years to maintain immunity against tetanus. Td may also be given as tetanus wound prophylaxis.   Tdap Vaccine - COST NOT COVERED BY MEDICARE PART B Recommended at least once for all adults. For pregnant patients, recommended with each pregnancy.   Shingles Vaccine (Shingrix) - COST NOT COVERED BY MEDICARE PART B  2 shot series recommended in those 19 years and older who have or will have weakened immune systems or those 50 years and older     Health Maintenance Due:      Topic Date Due   • Colorectal Cancer Screening  09/11/2025   • Hepatitis C Screening  Completed     Immunizations Due:      Topic Date Due   • COVID-19 Vaccine (5 - 2023-24 season) 09/01/2024     Advance Directives   What are advance directives?  Advance directives are legal documents that state your wishes and plans for medical care. These plans are made ahead of time in case you lose your  ability to make decisions for yourself. Advance directives can apply to any medical decision, such as the treatments you want, and if you want to donate organs.   What are the types of advance directives?  There are many types of advance directives, and each state has rules about how to use them. You may choose a combination of any of the following:  Living will:  This is a written record of the treatment you want. You can also choose which treatments you do not want, which to limit, and which to stop at a certain time. This includes surgery, medicine, IV fluid, and tube feedings.   Durable power of  for healthcare (DPAHC):  This is a written record that states who you want to make healthcare choices for you when you are unable to make them for yourself. This person, called a proxy, is usually a family member or a friend. You may choose more than 1 proxy.  Do not resuscitate (DNR) order:  A DNR order is used in case your heart stops beating or you stop breathing. It is a request not to have certain forms of treatment, such as CPR. A DNR order may be included in other types of advance directives.  Medical directive:  This covers the care that you want if you are in a coma, near death, or unable to make decisions for yourself. You can list the treatments you want for each condition. Treatment may include pain medicine, surgery, blood transfusions, dialysis, IV or tube feedings, and a ventilator (breathing machine).  Values history:  This document has questions about your views, beliefs, and how you feel and think about life. This information can help others choose the care that you would choose.  Why are advance directives important?  An advance directive helps you control your care. Although spoken wishes may be used, it is better to have your wishes written down. Spoken wishes can be misunderstood, or not followed. Treatments may be given even if you do not want them. An advance directive may make it easier  for your family to make difficult choices about your care.   Weight Management   Why it is important to manage your weight:  Being overweight increases your risk of health conditions such as heart disease, high blood pressure, type 2 diabetes, and certain types of cancer. It can also increase your risk for osteoarthritis, sleep apnea, and other respiratory problems. Aim for a slow, steady weight loss. Even a small amount of weight loss can lower your risk of health problems.  How to lose weight safely:  A safe and healthy way to lose weight is to eat fewer calories and get regular exercise. You can lose up about 1 pound a week by decreasing the number of calories you eat by 500 calories each day.   Healthy meal plan for weight management:  A healthy meal plan includes a variety of foods, contains fewer calories, and helps you stay healthy. A healthy meal plan includes the following:  Eat whole-grain foods more often.  A healthy meal plan should contain fiber. Fiber is the part of grains, fruits, and vegetables that is not broken down by your body. Whole-grain foods are healthy and provide extra fiber in your diet. Some examples of whole-grain foods are whole-wheat breads and pastas, oatmeal, brown rice, and bulgur.  Eat a variety of vegetables every day.  Include dark, leafy greens such as spinach, kale, yan greens, and mustard greens. Eat yellow and orange vegetables such as carrots, sweet potatoes, and winter squash.   Eat a variety of fruits every day.  Choose fresh or canned fruit (canned in its own juice or light syrup) instead of juice. Fruit juice has very little or no fiber.  Eat low-fat dairy foods.  Drink fat-free (skim) milk or 1% milk. Eat fat-free yogurt and low-fat cottage cheese. Try low-fat cheeses such as mozzarella and other reduced-fat cheeses.  Choose meat and other protein foods that are low in fat.  Choose beans or other legumes such as split peas or lentils. Choose fish, skinless poultry  (chicken or turkey), or lean cuts of red meat (beef or pork). Before you cook meat or poultry, cut off any visible fat.   Use less fat and oil.  Try baking foods instead of frying them. Add less fat, such as margarine, sour cream, regular salad dressing and mayonnaise to foods. Eat fewer high-fat foods. Some examples of high-fat foods include french fries, doughnuts, ice cream, and cakes.  Eat fewer sweets.  Limit foods and drinks that are high in sugar. This includes candy, cookies, regular soda, and sweetened drinks.  Exercise:  Exercise at least 30 minutes per day on most days of the week. Some examples of exercise include walking, biking, dancing, and swimming. You can also fit in more physical activity by taking the stairs instead of the elevator or parking farther away from stores. Ask your healthcare provider about the best exercise plan for you.      © Copyright [employer] 2018 Information is for End User's use only and may not be sold, redistributed or otherwise used for commercial purposes. All illustrations and images included in CareNotes® are the copyrighted property of [employer] or [employer]

## 2024-10-25 NOTE — PROGRESS NOTES
"Assessment/Plan:    No problem-specific Assessment & Plan notes found for this encounter.    Right TM arthralgia  Suggest ice tid  Medrol course, risks aware  Avoid nsaids  Soft foods  Dentist if no better    Htn stable  Off amlodipine     PAF  On noac  Rate remains controlled     Diagnoses and all orders for this visit:    Medicare annual wellness visit, subsequent    PAF (paroxysmal atrial fibrillation) (HCC)    Essential hypertension    Arthralgia of right temporomandibular joint  -     methylPREDNISolone 4 MG tablet therapy pack; Use as directed on package        No follow-ups on file.    Subjective:      Patient ID: Regino Huffman is a 74 y.o. male.    Chief Complaint   Patient presents with    Earache     R ear  [id number]       HPI  Right ear area pain  Very bad  4d  No fever  Severe per pt  Affects sleep  No injury  Hearing ok  Hurts to chew  Tried vicks vaporub on top    The following portions of the patient's history were reviewed and updated as appropriate: allergies, current medications, past family history, past medical history, past social history, past surgical history and problem list.    Review of Systems   Constitutional:  Negative for chills and fever.         Current Outpatient Medications   Medication Sig Dispense Refill    apixaban (ELIQUIS) 5 mg Take 1 tablet (5 mg total) by mouth 2 (two) times a day 180 tablet 1    atorvastatin (LIPITOR) 20 mg tablet Take 1 tablet (20 mg total) by mouth daily with dinner 90 tablet 1    Calcium Citrate-Vitamin D 250 mg-2.5 mcg tablet Take 1 tablet by mouth 2 (two) times a day      methylPREDNISolone 4 MG tablet therapy pack Use as directed on package 21 tablet 0    metoprolol tartrate (LOPRESSOR) 50 mg tablet Take 1 tablet (50 mg total) by mouth every 12 (twelve) hours 180 tablet 1     No current facility-administered medications for this visit.       Objective:    /72   Pulse 66   Temp 97.6 °F (36.4 °C)   Resp 18   Ht 5' 6\" (1.676 m)   Wt 73.5 kg " (162 lb)   SpO2 98%   BMI 26.15 kg/m²        Physical Exam  Vitals and nursing note reviewed.   Constitutional:       General: He is not in acute distress.     Appearance: He is well-developed. He is not ill-appearing.   HENT:      Head: Normocephalic.      Right Ear: Tympanic membrane, ear canal and external ear normal.      Left Ear: Tympanic membrane, ear canal and external ear normal.      Ears:      Comments: Right TMJ tender and with jaw rom, no clicking, no redness or parotid gland swelling, no mucosal lesions or masss  Eyes:      General: No scleral icterus.     Conjunctiva/sclera: Conjunctivae normal.   Cardiovascular:      Rate and Rhythm: Normal rate and regular rhythm.      Heart sounds: No murmur heard.  Pulmonary:      Effort: Pulmonary effort is normal. No respiratory distress.      Breath sounds: No wheezing.   Abdominal:      Palpations: Abdomen is soft.   Musculoskeletal:         General: No deformity.      Cervical back: Neck supple.   Skin:     General: Skin is warm and dry.      Coloration: Skin is not pale.   Neurological:      Mental Status: He is alert.      Motor: No weakness.      Gait: Gait normal.   Psychiatric:         Mood and Affect: Mood normal.         Behavior: Behavior normal.         Thought Content: Thought content normal.                Blanco Rojo DO

## 2024-10-26 PROBLEM — Z00.00 MEDICARE ANNUAL WELLNESS VISIT, SUBSEQUENT: Status: ACTIVE | Noted: 2024-10-26

## 2024-10-26 NOTE — PROGRESS NOTES
Ambulatory Visit  Name: Regino Huffman      : 1950      MRN: [id number]  Encounter Provider: Blanco Rojo DO  Encounter Date: 10/25/2024   Encounter department: [facility]    Assessment & Plan  PAF (paroxysmal atrial fibrillation) (HCC)         Essential hypertension         Arthralgia of right temporomandibular joint    Orders:    methylPREDNISolone 4 MG tablet therapy pack; Use as directed on package    Medicare annual wellness visit, subsequent            Preventive health issues were discussed with patient, and age appropriate screening tests were ordered as noted in patient's After Visit Summary. Personalized health advice and appropriate referrals for health education or preventive services given if needed, as noted in patient's After Visit Summary.    History of Present Illness     Earache        Patient Care Team:  Blanco Rojo DO as PCP - General (Family Medicine)  Blanco Rojo DO as PCP - [facility]CP-[facility] ([facility])    Review of Systems   HENT:  Positive for ear pain.      Medical History Reviewed by provider this encounter:  Tobacco  Allergies  Meds  Problems  Med Hx  Surg Hx  Fam Hx       Annual Wellness Visit Questionnaire   Reigno is here for his Subsequent Wellness visit. Last Medicare Wellness visit information reviewed, patient interviewed and updates made to the record today.      Health Risk Assessment:   Patient rates overall health as good. Patient feels that their physical health rating is same. Patient is satisfied with their life. Eyesight was rated as same. Hearing was rated as same. Patient feels that their emotional and mental health rating is much better. Patients states they are never, rarely angry. Patient states they are sometimes unusually tired/fatigued. Pain experienced in the last 7 days has been some. Patient's pain rating has been 9/10. Patient states that he has experienced no weight loss or gain in last 6 months.     Depression  Screening:   PHQ-2 Score: 0      Fall Risk Screening:   In the past year, patient has experienced: history of falling in past year    Number of falls: 1  Injured during fall?: No    Feels unsteady when standing or walking?: No    Worried about falling?: No      Home Safety:  Patient has trouble with stairs inside or outside of their home. Patient has working smoke alarms and has working carbon monoxide detector. Home safety hazards include: none.     Nutrition:   Current diet is Regular.     Medications:   Patient is currently taking over-the-counter supplements. OTC medications include: see medication list. Patient is able to manage medications.     Activities of Daily Living (ADLs)/Instrumental Activities of Daily Living (IADLs):   Walk and transfer into and out of bed and chair?: Yes  Dress and groom yourself?: Yes    Bathe or shower yourself?: Yes    Feed yourself? Yes  Do your laundry/housekeeping?: Yes  Manage your money, pay your bills and track your expenses?: Yes  Make your own meals?: Yes    Do your own shopping?: Yes    Previous Hospitalizations:   Any hospitalizations or ED visits within the last 12 months?: No      Advance Care Planning:   Living will: No    Durable POA for healthcare: No    End of Life Decisions reviewed with patient: No      Cognitive Screening:   Provider or family/friend/caregiver concerned regarding cognition?: No    PREVENTIVE SCREENINGS      Cardiovascular Screening:    General: Screening Not Indicated and History Lipid Disorder      Diabetes Screening:     General: Screening Not Indicated and History Diabetes      Colorectal Cancer Screening:     General: Screening Current      Prostate Cancer Screening:    General: History Prostate Cancer and Screening Not Indicated      Osteoporosis Screening:    General: Screening Not Indicated      Abdominal Aortic Aneurysm (AAA) Screening:    Risk factors include: age between 65-74 yo and tobacco use        General: Screening Not  "Indicated      Lung Cancer Screening:     General: Screening Not Indicated      Hepatitis C Screening:    General: Screening Current    Hep C Screening Accepted: No     Screening, Brief Intervention, and Referral to Treatment (SBIRT)    Screening  Typical number of drinks in a day: 0  Typical number of drinks in a week: 0  Interpretation: Low risk drinking behavior.    Single Item Drug Screening:  How often have you used an illegal drug (including marijuana) or a prescription medication for non-medical reasons in the past year? never    Single Item Drug Screen Score: 0  Interpretation: Negative screen for possible drug use disorder    Other Counseling Topics:   Car/seat belt/driving safety and regular weightbearing exercise.     Social Determinants of Health     Financial Resource Strain: Low Risk  (10/30/2023)    Overall Financial Resource Strain (CARDIA)     Difficulty of Paying Living Expenses: Not very hard   Food Insecurity: No Food Insecurity (10/26/2024)    Hunger Vital Sign     Worried About Running Out of Food in the Last Year: Never true     Ran Out of Food in the Last Year: Never true   Transportation Needs: No Transportation Needs (10/26/2024)    PRAPARE - Transportation     Lack of Transportation (Medical): No     Lack of Transportation (Non-Medical): No   Housing Stability: Low Risk  (10/26/2024)    Housing Stability Vital Sign     Unable to Pay for Housing in the Last Year: No     Number of Times Moved in the Last Year: 0     Homeless in the Last Year: No   Utilities: Not At Risk (10/26/2024)    [facility] Utilities     Threatened with loss of utilities: No     No results found.    Objective     /72   Pulse 66   Temp 97.6 °F (36.4 °C)   Resp 18   Ht 5' 6\" (1.676 m)   Wt 73.5 kg (162 lb)   SpO2 98%   BMI 26.15 kg/m²     Physical Exam    "

## 2024-10-29 ENCOUNTER — OFFICE VISIT (OUTPATIENT)
Facility: CLINIC | Age: 74
End: 2024-10-29
Payer: COMMERCIAL

## 2024-10-29 DIAGNOSIS — R42 DIZZINESS: Primary | ICD-10-CM

## 2024-10-29 DIAGNOSIS — R26.89 IMBALANCE: ICD-10-CM

## 2024-10-29 DIAGNOSIS — R26.9 GAIT DISORDER: ICD-10-CM

## 2024-10-29 PROCEDURE — 97112 NEUROMUSCULAR REEDUCATION: CPT

## 2024-10-29 NOTE — PROGRESS NOTES
"Daily Note     Today's date: 10/29/2024  Patient name: Regino Huffman  : 1950  MRN: [id number]  Referring provider: Blanco Rojo DO  Dx:   Encounter Diagnosis     ICD-10-CM    1. Dizziness  R42       2. Gait disorder  R26.9       3. Imbalance  R26.89                 POC expires Unit limit Auth Expiration date PT/OT + Visit Limit? Co-Insurance   24 16 v 10/1-  No and $10 Co-pay                               Visit/Unit Tracking  AUTH Status:  Date 10/1 10/3 10/8 10/10 10/16 10/22 10/24 10/29       16 visits  Used 1 1 1 1 1 1 1 1        Remaining  15 14 13 12 11 10 9 8           PCP    Neurologist/Last Seen    ENT/Last Seen    Psych/Last Seen    OT    Imaging        Subjective: Patient presents to PT stating he went to his PCP recently due to L ear pain which has been ongoing for ~8 days which is occasionally accompanied by a headache. He received an anti-biotic which he has been taking.    As per Dr. Hernandez on 10/15/24: \"Hi - Just looked at his chart. Looks like he converted spontaneously. No restrictions\"    Objective: See treatment diary below    TA:   Vitals:   HR: 60 bpm  SpO2: 98%   BP: 156/88      Per [employer]: >180/110 mmHg at rest, or >250/115 mmHg with activity, need to stop activity and re-assess after 5 minutes.      - Sidestepping on foam beam w/ HT/HN: 7 laps each  - FTEC on foam beam: 30 sec, 3 sets  - Bwd onto medium RR w/ 10# TT: 20x  - Step taps to 8\" from foam pad: 30x  - Standing on foam pad while manipulating cones from floor to chair: 1 min, 2 sets  - [employer] Trainer   LoS, medium, 0UE: 4%, 6%    Assessment: Patient tolerated treatment session well with focus on balance training. Patient was most challenged while on compliant surfaces as he displayed increased A-P postural sway however, able to utilize appropriate lower extremity strategies to maintain balance. He required heavy verbal and tactile cueing to complete [employer]  activity which improved " during second repetition suggesting good motor learning. He will benefit from continues skilled OPPT services to maximize function and improve independence.     Plan: Continue per plan of care.        Outcome Measures Initial Eval  10/1/24   5xSTS 15.94 sec   10 meter 10.03 ft/sec  1.00 m/s   FGA 15/30   DGI 13/24   mCTSIB  - FTEO (firm)  - FTEC (firm)  - FTEO (foam)  - FTEC (foam)   30 sec  30 sec +  30 sec +  2 sec   6MWT defer

## 2024-10-31 ENCOUNTER — OFFICE VISIT (OUTPATIENT)
Facility: CLINIC | Age: 74
End: 2024-10-31
Payer: COMMERCIAL

## 2024-10-31 DIAGNOSIS — R42 DIZZINESS: Primary | ICD-10-CM

## 2024-10-31 DIAGNOSIS — R26.89 IMBALANCE: ICD-10-CM

## 2024-10-31 DIAGNOSIS — R26.9 GAIT DISORDER: ICD-10-CM

## 2024-10-31 PROCEDURE — 97530 THERAPEUTIC ACTIVITIES: CPT

## 2024-10-31 NOTE — PROGRESS NOTES
"Daily Note     Today's date: 10/31/2024  Patient name: Regino Huffman  : 1950  MRN: [id number]  Referring provider: Blanco Rojo DO  Dx:   Encounter Diagnosis     ICD-10-CM    1. Dizziness  R42       2. Imbalance  R26.89       3. Gait disorder  R26.9                 POC expires Unit limit Auth Expiration date PT/OT + Visit Limit? Co-Insurance   24 16 v 10/1-  No and $10 Co-pay                               Visit/Unit Tracking  AUTH Status:  Date 10/1 10/3 10/8 10/10 10/16 10/22 10/24 10/29 10/31      16 visits  Used 1 1 1 1 1 1 1 1 1       Remaining  15 14 13 12 11 10 9 8 7          PCP    Neurologist/Last Seen    ENT/Last Seen    Psych/Last Seen    OT    Imaging        Subjective: Patient presents to PT with reports of stomachache.     As per Dr. Hernandez on 10/15/24: \"Hi - Just looked at his chart. Looks like he converted spontaneously. No restrictions\"    Objective: See treatment diary below    TA:   Vitals:   HR: 60 bpm  SpO2: 98%   BP: 166/116 mmHg, 148/117 mmHg after 5 min, 161/122 mmHg after 2 min, after 5 min BP: 164/111 mmHg     Per [employer] PT: >180/110 mmHg at rest, or >250/115 mmHg with activity, need to stop activity and re-assess after 5 minutes.      NT:  - Sidestepping on foam beam w/ HT/HN: 7 laps each  - FTEC on foam beam: 30 sec, 3 sets  - Bwd onto medium RR w/ 10# TT: 20x  - Step taps to 8\" from foam pad: 30x  - Standing on foam pad while manipulating cones from floor to chair: 1 min, 2 sets  - [employer] Trainer   LoS, medium, 0UE: 4%, 6%    Assessment: Patient tolerated treatment session poorly secondary to HIGH BP readings. This may be related to patient's pain levels as he reports a stomachache today. Heavy education to monitor his BP at home due to high reporting in therapy. He was in understanding/agreement. If patient continues with high BP next session, plan to contact doctor to inform of readings. He will benefit from continues skilled OPPT services to " maximize function and improve independence.     Plan: Continue per plan of care.        Outcome Measures Initial Eval  10/1/24   5xSTS 15.94 sec   10 meter 10.03 ft/sec  1.00 m/s   FGA 15/30   DGI 13/24   mCTSIB  - FTEO (firm)  - FTEC (firm)  - FTEO (foam)  - FTEC (foam)   30 sec  30 sec +  30 sec +  2 sec   6MWT defer

## 2024-11-03 NOTE — ASSESSMENT & PLAN NOTE
EKG today shows sinus bradycardia.  RBBB  Denies any significant palpitations  Continue on metoprolol 50 mg twice daily and Eliquis 5 mg twice daily  [id number] risk points: 3 (CHF, HTN, age)

## 2024-11-03 NOTE — ASSESSMENT & PLAN NOTE
Wt Readings from Last 3 Encounters:   11/04/24 73.5 kg (162 lb)   10/25/24 73.5 kg (162 lb)   10/23/24 73.9 kg (163 lb)   Most recent echo with normal LVEF  Appears euvolemic  Continue above regimen

## 2024-11-03 NOTE — PROGRESS NOTES
General Cardiology Note  Regino Huffman  1950  [id number]  [facility]  [address]  [address], [address]  [place] [address] [phone]    Referring Provider - No ref. provider found  Chief Complaint   Patient presents with    Follow-up     Pt c/o elevated BP. PT noted 166/116, 148/117, 161/122      Assessment & Plan  PAF (paroxysmal atrial fibrillation) (HCC)  EKG today shows sinus bradycardia.  RBBB  Denies any significant palpitations  Continue on metoprolol 50 mg twice daily and Eliquis 5 mg twice daily  [id number]-MCPq risk points: 3 (CHF, HTN, age)   Essential hypertension  BP is high today 176/104.  Does have a diary of recent high readings  Continue on metoprolol 50 mg twice daily  Restart amlodipine 5 mg daily.  He is to monitor his blood pressure at home and call the office with his numbers in the next 2 weeks or use MyChart  Continue low sodium diet  Chronic diastolic heart failure (HCC)  Wt Readings from Last 3 Encounters:   11/04/24 73.5 kg (162 lb)   10/25/24 73.5 kg (162 lb)   10/23/24 73.9 kg (163 lb)   Most recent echo with normal LVEF  Appears euvolemic  Continue above regimen  History of tobacco abuse  Half pack/day for 41 years.  Quit around 2005  Other hyperlipidemia  June 2024 , trig 192  Continue on Lipitor 20 mg daily  Reinforced dietary lifestyle changes.  Repeat lipid panel prior to next appointment  Pre-diabetes  A1c in October 2024 is 5.9%.  Care per PCP  Discussed diet and lifestyle changes     Will RTO in 3 months or sooner if necessary. Will call with any concerns.     Interval History: 74 y.o.  male  with PMH prediabetes, hypertension, hyperlipidemia presents for HFU. Follows with Dr. Hernandez    -He does have a history of A-fib while hospitalized for COVID a few years ago.  48-hour Holter monitor was done in February 2023 and shows no evidence of A-fib  - Admitted 10/11 - 10/14/2024: He presented with shortness of  breath and was found to be in A-fib with RVR.  He was placed on diltiazem infusion and subsequently converted back to normal sinus rhythm. He was placed on beta-blocker therapy and Eliquis.  - His echocardiogram shows a normal ejection fraction 50 to 55% with borderline concentric hypertrophy, mild MR and mild TR, PASP 35 to 40 mmHg.  Mild CARLA.  There is aortic valve sclerosis.   - He does have an abnormal overnight sleep screens suspicious of sleep apnea.   - His nuclear stress test on 10/14/24 was negative for ischemia.      Today,  reports some CAPPS when going up an incline.  CAPPS is not worsening  He denies any chest pain, no significant palpitations.  Reports right shoulder pain.  States that he is watching his sodium intake, but he does eat a lot of rice and bread.   He states that he has not been able to do PT due to high BP readings.  He goes for a 30-minute walk 3 times a week.  Said that he is compliant with medication regimen.  Denies any bleeding on the Eliquis.  He does have some bruising on the upper arms      Patient Active Problem List    Diagnosis Date Noted    Medicare annual wellness visit, subsequent 10/26/2024    Arthralgia of right temporomandibular joint 10/25/2024    Diastolic heart failure (HCC) 10/14/2024    Atrial fibrillation with RVR (HCC) 10/11/2024    Other hyperlipidemia 10/11/2024    Pre-diabetes 10/11/2024    Dizziness 09/23/2024    Gait disorder 09/23/2024    Bilateral shoulder region arthritis 07/08/2024    Hoarseness of voice 02/06/2024    Umbilical hernia without obstruction and without gangrene 10/30/2023    BMI 26.0-26.9,adult 10/30/2023    Vitamin D insufficiency 03/12/2023    Essential hypertension 01/06/2023    PAF (paroxysmal atrial fibrillation) (HCC) 12/23/2022    Personal history of colonic polyps 09/13/2022    Multiple thyroid nodules 09/23/2021    Immunization due 08/18/2021    History of tobacco abuse 08/18/2021    Chronic pain of left ankle 08/09/2021    Adrenal  adenoma, left 2021    Liver cyst 2021    Colon, diverticulosis 2021    Ureterolithiasis 2021    BPH (benign prostatic hyperplasia)      Past Medical History:   Diagnosis Date    A-fib (HCC)     Arthritis     shoulder    BPH (benign prostatic hyperplasia)     Disease of thyroid gland     nodules    Hypertension     Kidney calculi     left    Kidney stone     Nocturia     Urinary incontinence     During the night, urinary frequency during the day.     Social History     Tobacco Use    Smoking status: Former     Current packs/day: 0.00     Average packs/day: 0.5 packs/day for 41.0 years (20.5 ttl pk-yrs)     Types: Cigarettes     Start date: 1964     Quit date: 2005     Years since quittin.8     Passive exposure: Never    Smokeless tobacco: Never   Vaping Use    Vaping status: Never Used   Substance Use Topics    Alcohol use: Not Currently     Comment: rare    Drug use: No      History reviewed. No pertinent family history.  Past Surgical History:   Procedure Laterality Date    COLONOSCOPY      EXTRACORPOREAL SHOCK WAVE LITHOTRIPSY      FRACTURE SURGERY Right     ankle with hardware    LA CYSTO/URETERO W/LITHOTRIPSY &INDWELL STENT INSRT Left 2021    Procedure: CYSTOSCOPY URETEROSCOPY WITH LITHOTRIPSY HOLMIUM LASER, RETROGRADE PYELOGRAM AND INSERTION STENT URETERAL;  Surgeon: Rajesh Méndez MD;  Location: [facility];  Service: Urology    TRANSURETHRAL RESECTION OF PROSTATE      US GUIDED THYROID BIOPSY  10/25/2021    US GUIDED THYROID BIOPSY  2022       Current Outpatient Medications:     amLODIPine (NORVASC) 5 mg tablet, Take 1 tablet (5 mg total) by mouth daily, Disp: 90 tablet, Rfl: 3    apixaban (ELIQUIS) 5 mg, Take 1 tablet (5 mg total) by mouth 2 (two) times a day, Disp: 180 tablet, Rfl: 1    atorvastatin (LIPITOR) 20 mg tablet, Take 1 tablet (20 mg total) by mouth daily with dinner, Disp: 90 tablet, Rfl: 1    Calcium Citrate-Vitamin D 250 mg-2.5 mcg tablet, Take  "1 tablet by mouth 2 (two) times a day, Disp: , Rfl:     metoprolol tartrate (LOPRESSOR) 50 mg tablet, Take 1 tablet (50 mg total) by mouth every 12 (twelve) hours, Disp: 180 tablet, Rfl: 1    No Known Allergies    Vitals:    11/04/24 1443 11/04/24 1506   BP: (!) 170/118 (!) 176/104   BP Location: Right arm    Patient Position: Sitting    Cuff Size: Standard    Pulse: 59    SpO2: 98%    Weight: 73.5 kg (162 lb)    Height: 5' 6\" (1.676 m)         Vitals:    11/04/24 1443   Weight: 73.5 kg (162 lb)      Height: 5' 6\" (167.6 cm)   Body mass index is 26.15 kg/m².    Labs:     Chemistry        Component Value Date/Time    K 3.8 10/14/2024 0442     10/14/2024 0442    CO2 25 10/14/2024 0442    BUN 20 10/14/2024 0442    CREATININE 1.01 10/14/2024 0442        Component Value Date/Time    CALCIUM 8.5 10/14/2024 0442    ALKPHOS 38 10/13/2024 1028    AST 13 10/13/2024 1028    ALT 20 10/13/2024 1028          Lab Results   Component Value Date    HGBA1C 5.9 (H) 10/11/2024      No results found for: \"CHOL\"  Lab Results   Component Value Date    HDL 74 06/26/2024    HDL 96 03/08/2022    HDL 90 02/24/2021     Lab Results   Component Value Date    LDLCALC 121 (H) 06/26/2024    LDLCALC 126 (H) 03/08/2022    LDLCALC 110 (H) 02/24/2021     Lab Results   Component Value Date    TRIG 192 (H) 06/26/2024    TRIG 134 03/08/2022    TRIG 118 02/24/2021     No results found for: \"CHOLHDL\"    Imaging: NM Myocardial Perfusion Spect (Pharmacological Induced Stress and/or Rest)    Result Date: 10/14/2024  Narrative:   Stress ECG: The stress ECG is equivocal for ischemia after pharmacologic vasodilation.   Stress ECG: The patient after exercising for 1 min and had a maximal HR of 83 bpm (56% of MPHR) and 1.0 METS. The patient experienced no angina during the test.   Perfusion: There are no perfusion defects.   Stress Combined Conclusion: Left ventricular perfusion is normal. Negative study for evidence of major reversible perfusion defect with " pharmacological vasodilation to suggest ischemia or prior infarction.  EF 48% by gated imaging.     Stress strip    Result Date: 10/14/2024  Narrative: Confirmed by MANI RIDDLE ([id number]),  Vicky Stanley ([id number]) on 10/14/2024 3:41:28 PM    US right upper quadrant    Result Date: 10/14/2024  Narrative: RIGHT UPPER QUADRANT ULTRASOUND INDICATION: Evaluate liver cyst. COMPARISON: Ultrasound kidney and bladder 8/5/2021. Multiple prior abdomen/pelvis CTs with the most recent being obtained 7/17/2017. TECHNIQUE: Real-time ultrasound of the right upper quadrant was performed with a curvilinear transducer with both volumetric sweeps and still imaging techniques. FINDINGS: PANCREAS: Visualized portions of the pancreas are within normal limits. AORTA AND IVC: Limited visualization due to shadowing. No gross abnormality identified within these confines. LIVER: Size: Within normal range. The liver measures 13.8 cm in the midclavicular line. Contour: Surface contour is smooth. Parenchyma: There is mild diffuse increased echogenicity with smooth echotexture, without significant beam attenuation or loss of periportal echogenicity. Most consistent with mild hepatic steatosis. Cyst is seen within the left hepatic lobe measuring 7.5 x 6.1 x 7.7 cm. The cyst appears stable to minimally enlarged from CT of July 2021. Thin incomplete septations are seen along the periphery of the cyst. No evidence of intrinsic vascularity on color  Doppler images or definitive solid component. Additional cyst is seen measuring 2.5 x 2.9 x 2.0 cm. This cyst is suboptimally visualized due to shadowing but appears simple within these confines without evidence of intrinsic color Doppler signal. There is an additional simple appearing cyst measuring 1.7 x 1.5 x 1.6 cm. Limited imaging of the main portal vein shows it to be patent and hepatopetal. BILIARY: No gallbladder findings. No intrahepatic biliary dilatation. CBD measures 3.0 mm. No evidence of  choledocholithiasis. KIDNEY: Right kidney measures 10.1 x 6.5 x 5.5 cm. Volume 188.5 mL mild upper pole caliectasis without rigoberto hydronephrosis. Tiny cyst is seen within the lower pole measuring 0.8 x 0.7 x 0.8 cm. ASCITES: None.     Impression: 1. Numerous liver cysts as described above. The largest cyst measures 7.7 cm with thin incomplete septations and appears minimally enlarged from CT of July 2021. The liver is partially obscured due to shadowing and some of the smaller cysts are suboptimally visualized. 2. Diffusely echogenic appearance of the liver parenchyma which is most consistent with steatosis. 3. Mild upper pole caliectasis within the right kidney without rigoberto hydronephrosis. Workstation performed: [id number]     Echo complete w/ contrast if indicated    Result Date: 10/12/2024  Narrative:   Left Ventricle: Left ventricular cavity size is normal. Wall thickness is mildly increased. There is borderline concentric hypertrophy. The left ventricular ejection fraction is 50 to 55%. Systolic function is low normal. Although no diagnostic regional wall motion abnormality was identified, this possibility cannot be completely excluded on the basis of this study.   Left Atrium: The atrium is mildly dilated.   Right Atrium: The atrium is mildly dilated.   Aortic Valve: The aortic valve is trileaflet. The leaflets are moderately thickened. The leaflets are moderately calcified. There is mildly reduced mobility. There is aortic valve sclerosis.   Mitral Valve: There is mild annular calcification. There is at least mild regurgitation.   Tricuspid Valve: There is mild regurgitation.  Calculated pulmonary artery pressure 35 to 40 mmHg.   IVC/SVC: The inferior vena cava is normal in size. Respirophasic changes were normal.  Liver cysts were noted consider dedicated liver ultrasound for better evaluation.     XR chest 1 view portable    Result Date: 10/11/2024  Narrative: XR CHEST PORTABLE INDICATION: SOB.  COMPARISON: 6/4/2023 FINDINGS: Clear lungs. No pneumothorax or pleural effusion. Normal cardiomediastinal silhouette. Bones are unremarkable for age. Normal upper abdomen.     Impression: No acute cardiopulmonary disease. Workstation performed: [id number]       ECG: Sinus bradycardia.  59 bpm.  Right bundle branch block.  Left anterior fascicular block.  Voltage criteria for left ventricular hypertrophy.  Inferior infarct, age undetermined.  Reviewed by SARA Cotton      Review of Systems   Constitutional: Negative for chills, diaphoresis, fever and malaise/fatigue.   Cardiovascular:  Positive for dyspnea on exertion. Negative for chest pain, leg swelling, orthopnea, palpitations and syncope.   Respiratory:  Negative for cough and shortness of breath.    Gastrointestinal:  Negative for abdominal pain, nausea and vomiting.   Neurological:  Negative for dizziness, headaches and light-headedness.   Psychiatric/Behavioral:  Negative for altered mental status.    All other systems reviewed and are negative.    Except as noted in HPI, is otherwise reviewed in detail and a 12 point review of systems is negative.    Physical Exam  Vitals reviewed.   Constitutional:       General: He is not in acute distress.     Appearance: Normal appearance. He is not diaphoretic.   HENT:      Head: Normocephalic and atraumatic.   Eyes:      General: No scleral icterus.     Extraocular Movements: Extraocular movements intact.      Pupils: Pupils are equal, round, and reactive to light.   Cardiovascular:      Rate and Rhythm: Regular rhythm. Bradycardia present.      Pulses: Normal pulses.           Radial pulses are 2+ on the right side and 2+ on the left side.      Heart sounds: Normal heart sounds, S1 normal and S2 normal.   Pulmonary:      Effort: Pulmonary effort is normal. No tachypnea or respiratory distress.      Breath sounds: Normal breath sounds. No wheezing or rales.   Abdominal:      General: Bowel sounds are normal. There  is no distension.      Palpations: Abdomen is soft.   Musculoskeletal:      Right lower leg: No edema.      Left lower leg: No edema.   Skin:     General: Skin is warm and dry.      Capillary Refill: Capillary refill takes less than 2 seconds.   Neurological:      Mental Status: He is alert and oriented to person, place, and time.   Psychiatric:         Mood and Affect: Mood normal.         Behavior: Behavior normal.          **Please Note: This note may have been constructed using a voice recognition system**     Thank you for the opportunity to participate in the care of this patient.   SARA Cotton

## 2024-11-03 NOTE — ASSESSMENT & PLAN NOTE
June 2024 , trig 192  Continue on Lipitor 20 mg daily  Reinforced dietary lifestyle changes.  Repeat lipid panel prior to next appointment

## 2024-11-03 NOTE — ASSESSMENT & PLAN NOTE
BP is high today 176/104.  Does have a diary of recent high readings  Continue on metoprolol 50 mg twice daily  Restart amlodipine 5 mg daily.  He is to monitor his blood pressure at home and call the office with his numbers in the next 2 weeks or use MyChart  Continue low sodium diet

## 2024-11-04 ENCOUNTER — OFFICE VISIT (OUTPATIENT)
Dept: CARDIOLOGY CLINIC | Facility: CLINIC | Age: 74
End: 2024-11-04
Payer: COMMERCIAL

## 2024-11-04 VITALS
BODY MASS INDEX: 26.03 KG/M2 | DIASTOLIC BLOOD PRESSURE: 104 MMHG | HEART RATE: 59 BPM | HEIGHT: 66 IN | OXYGEN SATURATION: 98 % | WEIGHT: 162 LBS | SYSTOLIC BLOOD PRESSURE: 176 MMHG

## 2024-11-04 DIAGNOSIS — E78.49 OTHER HYPERLIPIDEMIA: ICD-10-CM

## 2024-11-04 DIAGNOSIS — I48.0 PAF (PAROXYSMAL ATRIAL FIBRILLATION) (HCC): Primary | ICD-10-CM

## 2024-11-04 DIAGNOSIS — I10 ESSENTIAL HYPERTENSION: ICD-10-CM

## 2024-11-04 DIAGNOSIS — Z87.891 HISTORY OF TOBACCO ABUSE: ICD-10-CM

## 2024-11-04 DIAGNOSIS — I50.32 CHRONIC DIASTOLIC HEART FAILURE (HCC): ICD-10-CM

## 2024-11-04 DIAGNOSIS — R73.03 PRE-DIABETES: ICD-10-CM

## 2024-11-04 PROCEDURE — 93000 ELECTROCARDIOGRAM COMPLETE: CPT

## 2024-11-04 PROCEDURE — 99214 OFFICE O/P EST MOD 30 MIN: CPT

## 2024-11-04 RX ORDER — AMLODIPINE BESYLATE 5 MG/1
5 TABLET ORAL DAILY
Qty: 90 TABLET | Refills: 3 | Status: SHIPPED | OUTPATIENT
Start: 2024-11-04 | End: 2024-11-06 | Stop reason: SDUPTHER

## 2024-11-04 NOTE — PATIENT INSTRUCTIONS
Please complete your lipid panel blood work before your next appointment  Obtain a blood pressure machine. Monitor your blood pressure and heart rate and keep a diary of your readings   Report your numbers in my chart or call the office in the next two weeks  Referral sent for sleep consult  Continue heart healthy diet by limiting saturated fat and added sugars while increasing fiber and healthy fats.  Caution with condiments.  Stay active and hydrated  Bring complete list of medications to your follow-up appointment.   Follow-up in 3 months or earlier if any concerns  Please call the office if you have any questions

## 2024-11-05 ENCOUNTER — TELEPHONE (OUTPATIENT)
Facility: CLINIC | Age: 74
End: 2024-11-05

## 2024-11-05 ENCOUNTER — OFFICE VISIT (OUTPATIENT)
Dept: SLEEP CENTER | Facility: CLINIC | Age: 74
End: 2024-11-05
Payer: COMMERCIAL

## 2024-11-05 VITALS
DIASTOLIC BLOOD PRESSURE: 80 MMHG | WEIGHT: 161.6 LBS | HEIGHT: 66 IN | OXYGEN SATURATION: 98 % | SYSTOLIC BLOOD PRESSURE: 160 MMHG | HEART RATE: 60 BPM | BODY MASS INDEX: 25.97 KG/M2

## 2024-11-05 DIAGNOSIS — I10 ESSENTIAL HYPERTENSION: ICD-10-CM

## 2024-11-05 DIAGNOSIS — I48.0 PAF (PAROXYSMAL ATRIAL FIBRILLATION) (HCC): ICD-10-CM

## 2024-11-05 DIAGNOSIS — R06.83 SNORING: ICD-10-CM

## 2024-11-05 DIAGNOSIS — Z87.891 FORMER TOBACCO USE: ICD-10-CM

## 2024-11-05 DIAGNOSIS — I50.32 CHRONIC DIASTOLIC HEART FAILURE (HCC): ICD-10-CM

## 2024-11-05 DIAGNOSIS — G47.19 EXCESSIVE DAYTIME SLEEPINESS: Primary | ICD-10-CM

## 2024-11-05 PROCEDURE — 99204 OFFICE O/P NEW MOD 45 MIN: CPT | Performed by: STUDENT IN AN ORGANIZED HEALTH CARE EDUCATION/TRAINING PROGRAM

## 2024-11-05 PROCEDURE — G2211 COMPLEX E/M VISIT ADD ON: HCPCS | Performed by: STUDENT IN AN ORGANIZED HEALTH CARE EDUCATION/TRAINING PROGRAM

## 2024-11-05 NOTE — ASSESSMENT & PLAN NOTE
Hypertension - We reviewed the association between untreated obstructive sleep apnea and the increased risk for hypertension. Patient to continue on prescribed anti-hypertensive therapy and follow up with PCP for continuity of care.   Orders:    Diagnostic Sleep Study; Future

## 2024-11-05 NOTE — ASSESSMENT & PLAN NOTE
Wt Readings from Last 3 Encounters:   11/05/24 73.3 kg (161 lb 9.6 oz)   11/04/24 73.5 kg (162 lb)   10/25/24 73.5 kg (162 lb)   - I reviewed recent CV TTE reports from his recent hospitalization. He will continue CV follow up. We reviewed the cardiac complications of untreated LISA. Patient verbalized understanding.       Orders:    Diagnostic Sleep Study; Future

## 2024-11-05 NOTE — TELEPHONE ENCOUNTER
Left voicemail regarding patient's missed appointment this date reminded him of upcoming appointment.

## 2024-11-05 NOTE — PROGRESS NOTES
Ambulatory Visit  Name: Regino Huffman      : 1950      MRN: [id number]  Encounter Provider: Jareth Nolan MD  Encounter Date: 2024   Encounter department: [facility] SLEEP MEDICINE [place]    Assessment & Plan  Excessive daytime sleepiness  Concern for Obstructive Sleep Apnea - Discussed pathophysiology of LISA, consequences of untreated LISA and treatment options including PAP therapy, mandibular advancement device, positional therapy, and surgical referral. - Discussed risks of sleepy driving and mitigation strategies (napping). Patient agrees to not drive if tired or sleepy. - Advised avoidance of alcohol and centrally acting medications as these can worsen LISA.  - EDS can be multifactorial - including insufficient sleep time, medical disorders, psychiatric disorders, medications, and other causes. I suspect his EDS may be secondary to underlying sleep disordered breathing and obstructive sleep apnea given clinical symptoms and findings. I have ordered an in lab PSG for further evaluation in the setting of recent hospitalization for Afib, HTN, HLD, and concern for sleep disordered breathing.   Orders:    Diagnostic Sleep Study; Future    PAF (paroxysmal atrial fibrillation) (HCC)  - Patient with recent hospitalization for PAfib at [facility]. Recent hospital notes reviewed. He is continued on Metoprolol and Eliquis for treatment of his Afib. He was recently seen by Cardiology on 24 for follow up. We reviewed the association between untreated LISA and Afib and other cardiac arrhythmias. I encouraged continued CV and PCP follow up.   Orders:    Ambulatory Referral to Sleep Medicine    Diagnostic Sleep Study; Future    Snoring  - We reviewed pathophysiology of snoring. I encouraged him to continue healthy lifestyle including healthy diet and exercise. We will follow up on management of snoring following sleep study.   Orders:    Diagnostic Sleep Study; Future    Chronic diastolic  "heart failure (HCC)  Wt Readings from Last 3 Encounters:   11/05/24 73.3 kg (161 lb 9.6 oz)   11/04/24 73.5 kg (162 lb)   10/25/24 73.5 kg (162 lb)   - I reviewed recent CV TTE reports from his recent hospitalization. He will continue CV follow up. We reviewed the cardiac complications of untreated LISA. Patient verbalized understanding.       Orders:    Diagnostic Sleep Study; Future    Essential hypertension  Hypertension - We reviewed the association between untreated obstructive sleep apnea and the increased risk for hypertension. Patient to continue on prescribed anti-hypertensive therapy and follow up with PCP for continuity of care.   Orders:    Diagnostic Sleep Study; Future    Former tobacco use  We reviewed the impact of smoking on upper airway swelling and worsening of sleep disordered breathing. I encouraged patient to continue to abstain from tobacco use.          History of Present Illness     I have personally reviewed the MA's review of systems and made changes as necessary.    Objective     /80   Pulse 60   Ht 5' 6\" (1.676 m)   Wt 73.3 kg (161 lb 9.6 oz)   SpO2 98%   BMI 26.08 kg/m²     [facility] New Patient Evaluation    Mr. Huffman is a a 74 y.o. male with a PMH of HTN, pAfib, HLD, Pre-DM, Chronic Diastolic Heart Failure who presents as a new patient for evaluation of sleep disordered breathing. He was referred to our office by Ad RUIZ from his Cardiologist's office.      He was recently admitted to [facility] for Afib with RVR. I have reviewed his admission H&P by Dr. Lewis Srivastava on 10/11/24 and his discharge summary by Dr. Evan Shepard on 10/14/24.     I also reviewed recent PCP note with Dr. Rojo on 10/25/24 for today's visit.     History of Presenting Illness:    The patient reports rare snoring. There are choking and gasping episodes that sometimes wake him from sleep. There are no witnessed apneas.Two - Three episode(s) of nocturia occur per night. " The patient sleeps on his back and side. He sleeps with one-two pillows. There are no reports of nocturnal behaviors.    The patient's [place] sleepiness scale score is 11/24 (<=10 is normal). He has not been sleepy while driving. He has not had a fall-asleep motor vehicle accident. There are no reports of hypnagogic hallucinations, cataplexy or sleep paralysis.    In terms of the patient's sleep/wake cycle symptoms:  Bedtime: 8-8:30pm.   SOL: 15-30 minutes  Nocturnal awakenings: Three, Bathroom, Tossing and Turning  Wakeup time: 5-6:00am   Naps: Most Days - 15-30 Minutes    Total sleep time estimate: Approximately 7 hours.     Weekends are similar to week days.    Occasional Seasonal Allergies and Nasal Congestion. Rare AM headaches which he subscribes as caffeine headaches.     He has not tried any medications for poor sleep in the past.    His legs do not bother him on trying to initiate sleep.    Past Medical History:   Diagnosis Date    A-fib (HCC)     Arthritis     shoulder    BPH (benign prostatic hyperplasia)     Disease of thyroid gland     nodules    Hypertension     Kidney calculi     left    Kidney stone     Nocturia     Urinary incontinence     During the night, urinary frequency during the day.        Past Surgical History:   Procedure Laterality Date    COLONOSCOPY      EXTRACORPOREAL SHOCK WAVE LITHOTRIPSY      FRACTURE SURGERY Right     ankle with hardware    AL CYSTO/URETERO W/LITHOTRIPSY &INDWELL STENT INSRT Left 07/18/2021    Procedure: CYSTOSCOPY URETEROSCOPY WITH LITHOTRIPSY HOLMIUM LASER, RETROGRADE PYELOGRAM AND INSERTION STENT URETERAL;  Surgeon: Rajesh Méndez MD;  Location: [facility];  Service: Urology    TRANSURETHRAL RESECTION OF PROSTATE      US GUIDED THYROID BIOPSY  10/25/2021    US GUIDED THYROID BIOPSY  03/09/2022      No prior upper airway surgeries.     No Known Allergies     Current Outpatient Medications on File Prior to Visit   Medication Sig Dispense Refill    amLODIPine  (NORVASC) 5 mg tablet Take 1 tablet (5 mg total) by mouth daily 90 tablet 3    apixaban (ELIQUIS) 5 mg Take 1 tablet (5 mg total) by mouth 2 (two) times a day 180 tablet 1    atorvastatin (LIPITOR) 20 mg tablet Take 1 tablet (20 mg total) by mouth daily with dinner 90 tablet 1    Calcium Citrate-Vitamin D 250 mg-2.5 mcg tablet Take 1 tablet by mouth 2 (two) times a day      metoprolol tartrate (LOPRESSOR) 50 mg tablet Take 1 tablet (50 mg total) by mouth every 12 (twelve) hours 180 tablet 1     No current facility-administered medications on file prior to visit.       Family History: His family history is not on file.    He denied family history of sleep disorders.     Social History:   Job: Retired from [employer]   Caffeine: 1 Cup of Coffee Daily, Rare Afternoon Cup  Alcohol: Rare Alcohol Use  Drugs: Denied  Tobacco: Former Smoker for 10 Years, Quarter to Half Pack of Cigarettes  Vape: Denied    Patient's medications, allergies, past medical, surgical, social and family histories were reviewed in the electronic medical record and updated as appropriate.    Review of Systems: On review of systems, the patient reports Occasional seasonal allergies and nasal sinus congestion.  - Fevers, Chills, Weight Loss   Apart from this, his review of systems is negative in detail.    Physical Examination:  Gen: No acute distress, not visibly anxious, speaking comfortably  H&N: MM III; no retro/micrognathia; macroglossia; no visible thyromegaly  Neuro: Alert and oriented x3, interactive  Psych: Pleasant, normal affect  Skin: No visible rashes  Respiratory: CTAB, breathing comfortably  Cardiac: RRR, trace 1+ BL edema of extremities  Abdomen: Soft, NT, ND  Musculoskeletal: ROM Intact of Extremities    Study Results:  I reviewed the following labs:    Lab Results   Component Value Date    FERRITIN 128 12/09/2022       Lab Results   Component Value Date    WBC 9.13 10/13/2024    HGB 15.4 10/13/2024    HCT 47.4 10/13/2024     MCV 99 (H) 10/13/2024     10/13/2024       This SmartLink has not been configured with any valid records.       Lab Results   Component Value Date    SODIUM 137 10/14/2024    K 3.8 10/14/2024     10/14/2024    CO2 25 10/14/2024    BUN 20 10/14/2024    CREATININE 1.01 10/14/2024    GLUC 98 10/14/2024    CALCIUM 8.5 10/14/2024       This SmartLink has not been configured with any valid records.       Lab Results   Component Value Date    AHS6DGPMDUBJ 0.523 10/11/2024        Results/Data:  I have reviewed the results and images from the CXR on 10/11/24 and the results from the TTE on 10/12/24.       I have reviewed report of NM Myocardial Perfusion Study on 10/14/24. Study was negative for evidence of major reversible perfusion defect to suggest ischemia or prior infarction.     I answered the patient's questions to the best of my ability. Follow-up will be in 4-6 weeks following sleep study.      Jareth Nolan MD  Sleep Medicine and Internal Medicine  [facility]  11/05/24

## 2024-11-05 NOTE — ASSESSMENT & PLAN NOTE
- Patient with recent hospitalization for PAfib at [facility]. Recent hospital notes reviewed. He is continued on Metoprolol and Eliquis for treatment of his Afib. He was recently seen by Cardiology on 11/4/24 for follow up. We reviewed the association between untreated LISA and Afib and other cardiac arrhythmias. I encouraged continued CV and PCP follow up.   Orders:    Ambulatory Referral to Sleep Medicine    Diagnostic Sleep Study; Future

## 2024-11-06 DIAGNOSIS — I10 ESSENTIAL HYPERTENSION: ICD-10-CM

## 2024-11-06 RX ORDER — AMLODIPINE BESYLATE 5 MG/1
5 TABLET ORAL DAILY
Qty: 90 TABLET | Refills: 1 | Status: SHIPPED | OUTPATIENT
Start: 2024-11-06

## 2024-11-06 NOTE — TELEPHONE ENCOUNTER
Good morning,  My pharmacy will not fill the prescription because it was refilled at the beginning of October, but since I was told at the hospital to stop taking this medicine, I threw out all of the pills so I would not get confused with my new ones.  Just wanted to check if there is anything that I can do.  They told me the prescription can't be refilled until January 2025.   Thank you,  Regino Huffman    Medication: amlodipine 5mg tablet    Dose/Frequency: 1 tablet daily    Quantity: 90 tablets    Pharmacy: [facility]    Office:   [] PCP/Provider -   [x] Speciality/Provider -     Does the patient have enough for 3 days?   [x] Yes   [] No - Send as HP to POD

## 2024-11-11 DIAGNOSIS — E78.49 OTHER HYPERLIPIDEMIA: ICD-10-CM

## 2024-11-12 RX ORDER — ATORVASTATIN CALCIUM 20 MG/1
20 TABLET, FILM COATED ORAL
Qty: 90 TABLET | Refills: 1 | Status: SHIPPED | OUTPATIENT
Start: 2024-11-12

## 2024-11-14 ENCOUNTER — EVALUATION (OUTPATIENT)
Facility: CLINIC | Age: 74
End: 2024-11-14
Payer: COMMERCIAL

## 2024-11-14 DIAGNOSIS — R26.9 GAIT DISORDER: ICD-10-CM

## 2024-11-14 DIAGNOSIS — R42 DIZZINESS: Primary | ICD-10-CM

## 2024-11-14 DIAGNOSIS — R26.89 IMBALANCE: ICD-10-CM

## 2024-11-14 PROCEDURE — 97530 THERAPEUTIC ACTIVITIES: CPT

## 2024-11-14 NOTE — PROGRESS NOTES
PT Re-Evaluation            POC expires Unit limit Auth Expiration date PT/OT + Visit Limit? Co-Insurance   24 16 v 10/1-  No and $10 Co-pay                               Visit/Unit Tracking  AUTH Status:  Date 10/1 10/3 10/8 10/10 10/16 10/22 10/24 10/29 10/31 11/14     16 visits  Used 1 1 1 1 1 1 1 1 1 1      Remaining  15 14 13 12 11 10 9 8 7 6                Today's date: 2024  Patient name: Regino Huffman  : 1950  MRN: [id number]  Referring provider: Blanco Rojo DO  Dx:   Encounter Diagnosis     ICD-10-CM    1. Dizziness  R42       2. Imbalance  R26.89       3. Gait disorder  R26.9               Assessment  Assessment details: Patient is a 74 y.o. Male who has been presenting to skilled outpatient PT with complaints of dizziness and activity intolerance which is impacting his balance confidence and ability to ambulate in his community. Patient past medical history significant for kidney stone removal and urinary incontinence. Since commencing skilled OPPT services he has reported significant decrease in dizziness symptoms and has reached ceiling for adaptation based exercises suggesting improvements in vestibular function. This is reflected in VOMs screening today as he only reported onset of dizziness during head shake test however, no visible nystagmus displayed. This is a great improvement from initial evaluation as he was symptomatic with VORx1, VOR cancel, and Head Shake Test. He also demonstrates significant improvements in lower extremity strength, dynamic balance, functional gait, gait speed, and in static balance as per 5x STS, DGI, FGA, 10 meter walk test, and mCTSIB respectively. Despite these improvements he remains at HIGH risk for falls as per APTA and Rehab Measure Lab cutoff scores for FGA and DGI. He has met 6 short term goals and 5 long term goals thus far with steady progression towards  remaining. Plan is to continue with balance and endurance training to promote return to PLOF and participation in recreational activities such as walking in the park. Patient is highly motivated to participate with skilled OPPT services and will benefit from continued care to target limitations, decreased risk for falls, and improve overall QoL.     Patient verbalized understanding of POC.    Please contact me if you have any questions or recommendations. Thank you for the referral and the opportunity to share in Regino Huffman's care.      Cut off score   All date taken from APTA Neuro Section or Rehab Measures    DGI:  MDC for Vestibular Disorders: 4 points  MDC for Geriatrics/Community Dwelling Older Adults: 3 Points  Falls risk cut off: <19/24    FGA:  MCID: 4 points  Geriatrics/Community Dwelling Older Adults: </= 22/30 fall risk  Geriatrics/Community Dwelling Older Adults: </= 20/30 unexplained falls in the next 6 months  Parkinsons: </= 18/30 fall risk    mCTSIB (normed on ages 20-60, lower number is less sway or better static balance)  Eyes open firm surface (norm 0.21-0.48)  Eyes closed firm surface (norm 0.48-0.99)  Eyes open foam surface (norm 0.38-0.71)  Eyes closed foam surface (norm 0.70-2.22)    DHI:  0-39: low perception of handicap  40-69: moderate perception of handicap  : severe perception of handicap  > 60: increased risk for falls    Joint Position Error Testing (JPET):  > 4.5 degrees: abnormal joint proprioception        Impairments: Abnormal coordination, abnormal gait, abnormal muscle tone, abnormal or restricted ROM, activity intolerance, impaired balance, impaired physical strength, lacks appropriate HEP, poor posture, poor body mechanics, pain with function, safety issue, abnormal movement  Understanding of Dx/Px/POC: good  Prognosis: good      Goals    Vestibular Short Term Goals (4 weeks):  - Patient will be independent with simple HEP-MET  - Patient will tolerate 60 seconds of  oculomotor exercises with minimal increase in symptoms-MET  - Patient will demonstrate 10% decrease in symptom severity scoring with independent use of modalities-MET  - Patient will be able to tolerate 30 seconds with eyes closed on foam surface without any loss of balance demonstrating improvement in vestibular system-ongoing  - Patient will improve with DGI by 3 points per MDC to promote improved safety with dynamic tasks-MET  - Patient will improve FGA score by 4 points per MDC to promote improved safety with dynamic tasks-MET  - Patient will improve 5xSTS score by 2.3 seconds to promote improved LE functional strength needed for ADLs-MET    Vestibular Long Term Goals (12 weeks):  - Patient will be independent with complex HEP-ongoing  - Patient will tolerate >=2 minutes of oculomotor exercises to facilitate return to reading and computer work-MET  - Patient will report >= 50% improvement on symptom severity scoring-MET  - Patient will demonstrate ability to perform HT in gait without veering-ongoing  - Patient will be able to perform 15 minutes of aerobic activity at HR 70% max to facilitate return to sport/normal functional tasks-ongoing  - Patient will score low risk for falls with DGI test with score of 19/24 or higher per current research data-ongoing  - Patient will score low risk for falls with FGA test with score of 23/30 or higher per current research data-ongoing  - Patient will report baseline dizziness of 1/10 or less -MET  - Patient will report 2/10 dizziness or less with visual stimulating surround with duration of 2 minutes -MET  - Patient will report subjective improvement to 90% or higher to promote return to PLOF-MET      Plan  Plan details: vestibular rehab, functional strengthening, balance  Patient would benefit from: PT Eval and Skilled PT  Planned modality interventions: Biofeedback, Cryotherapy, TENS, Thermotherapy  Planned therapy interventions: Abdominal trunk stabilization, ADL  training, balance, balance/WB training, breathing training, body mechanics training, coordination, flexibility, functional ROM exercises, gait training, HEP, manual therapy, Mckeon Taping, motor coordination training, neuromuscular re-education, patient education, postural training, strengthening, stretching, therapeutic activities, therapeutic exercises, work re-integration  Frequency: 2x/wk  Duration in weeks: 12 weeks  Plan of Care beginning date: 10/1/14  Plan of Care expiration date: 12/24/24  Treatment plan discussed with: patient        Subjective Evaluation    History of Present Illness  Mechanism of injury: Patient presents to PT stating he has been experiencing dizziness he describes as swaying which throws him off balance making it difficult for him to walk. He states he notices his dizziness most following medication consumption. He states he had a recent change to medication which has helped the dizziness however continues to feel imbalance. Patient also reports he has noticed decreased tolerance to activity as he is unable to walk long distances and his tolerance to exercise has decreased. He states his greatest goal is to improve overall tolerance to activity in order to return to walking in the park.     Updated 11/14/24: Patient participation with therapy has been globally limited secondary to high blood pressure readings which have improved. He states dizziness does not occur often he does notice it when he first awakes and when descending stairs. He states his tolerance to activity has improved however, he does notice that he easily fatigues.     Patient goal: tolerate more activity       Dizziness Subjective  How long does dizziness last: hour  How would you describe the dizziness: swaying dizziness   Rolling in bed: No  Supine to/from sit: Yes  Recent hearing loss: No  Tinnitus: No  Aural fullness/ear pain: No  Vision changes: Yes  History of migraines: Yes    Red Flag Screen  - Numbness:  "No  - Tingling: No  - Weakness: Yes  - Unilateral hearing loss: No  - Slurred speech: No  - Progressive hearing loss: No  - Tremors: No    Pain  NA     Social Support  Steps to enter house: 6 DARIANA  Stairs in house: 1 flight up  Lives in: multi-level home   Lives with: daughter and her family     Employment status: retired  Hand dominance: RHD    Treatments  Previous treatment: none  Current treatment: medication  Diagnostic Testing: none      Objective     Vestibular Objective  Cervical Spine AROM:  - Flexion: WFL no pain  - Extension: WFL no pain  - R Rotation: WFL no pain  - L Rotation: WFL no pain  - R Lateral Flexion: WFL pain at end range  - L Lateral Flexion: WFL no pain    Integrity Testing  - mVBI: normal  - Sharp Loni: normal  - Alar Stability Test: normal    Coordination Screen  - Dysmetria: normal  - Dysdiadochokinesia: normal    Oculomotor Screen  - Baseline Symptoms: 0/10  - Smooth Pursuits (central): H: Normal Dizziness: 0/10, Observation: difficulty tracking  - Saccades (central): H: Normal and V: Abnormal Dizziness: 0/10, Observation: vertical slowed  - Near Point Convergence (normal: < 4\"/10 cm - central): H: Normal Dizziness: 0/10, Observation: able to converge  - VORx1: H: Abnormal Dizziness: H:6/10 V:0/10, Observation: able to maintain gaze  - VOR Cancel (central): H: Normal Dizziness: H: 2/10, V:2/10 Observation: difficulty coordinating  - Head Thrust (moderate to severe hypofunction): H: Normal Dizziness: 0/10, Observation: difficulty maintaining gaze  - Head Shaking Test (mild hypofunction): H: Abnormal Dizziness: 6/10, Observation: no visible nystagmus    Oculomotor Screen 11/14/24  - Baseline Symptoms: 0/10  - Smooth Pursuits (central): H: Normal Dizziness: 0/10, Observation: difficulty tracking  - Saccades (central): H: Normal and V: Abnormal Dizziness: 0/10, Observation: vertical slowed  - Near Point Convergence (normal: < 4\"/10 cm - central): H: Normal Dizziness: 0/10, Observation: able " to converge  - VORx1: H: Abnormal Dizziness: H:0/10 V:0/10, Observation: able to maintain gaze  - VOR Cancel (central): H: Normal Dizziness: H: 0/10, V:0/10 Observation: difficulty coordinating  - Head Shaking Test (mild hypofunction): H: Abnormal Dizziness: 4/10, Observation: no visible nystagmus    LE MMT  - R Hip Flexion: 3+/5  L Hip Flexion: 3+/5  - R Hip Extension: 4-/5  L Hip Extension: 4-/5  - R Hip Abduction: 4/5  L Hip Abduction: 4/5  - R Hip Adduction: 4-/5  L Hip Adduction: 4-/5  - R Knee Extension: 4-/5  L Knee Extension: 4-/5  - R Knee Flexion: 4/5  L Knee Flexion: 4/5  - R Ankle DF: 4-/5   L Ankle DF: 4-/5  - R Ankle PF: 4-/5   L Ankle PF: 4-/5    BP: 138/90 mmHg  HR: 65 bpm  SpO2: 94%  Outcome Measures Initial Eval  10/1/24 PN  11/14/24   5xSTS 15.94 sec 11.63 sec   10 meter 10.03 ft/sec  1.00 m/s 1.71 m/s   FGA 15/30 20/30   DGI 13/24 17/24   mCTSIB  - FTEO (firm)  - FTEC (firm)  - FTEO (foam)  - FTEC (foam)   30 sec  30 sec +  30 sec +  2 sec   30 sec  30 sec  30 sec +  14 sec   6MWT defer 1350 ft   TUG  9.68 sec       Precautions:   Past Medical History:   Diagnosis Date    A-fib (HCC)     Arthritis     shoulder    BPH (benign prostatic hyperplasia)     Disease of thyroid gland     nodules    Hypertension     Kidney calculi     left    Kidney stone     Nocturia     Urinary incontinence     During the night, urinary frequency during the day.

## 2024-11-19 ENCOUNTER — OFFICE VISIT (OUTPATIENT)
Facility: CLINIC | Age: 74
End: 2024-11-19
Payer: COMMERCIAL

## 2024-11-19 DIAGNOSIS — R26.89 IMBALANCE: ICD-10-CM

## 2024-11-19 DIAGNOSIS — R42 DIZZINESS: Primary | ICD-10-CM

## 2024-11-19 DIAGNOSIS — R26.9 GAIT DISORDER: ICD-10-CM

## 2024-11-19 PROCEDURE — 97112 NEUROMUSCULAR REEDUCATION: CPT

## 2024-11-19 NOTE — PROGRESS NOTES
"Daily Note     Today's date: 2024  Patient name: Regino Huffman  : 1950  MRN: [id number]  Referring provider: Blanco Rojo DO  Dx:   Encounter Diagnosis     ICD-10-CM    1. Dizziness  R42       2. Imbalance  R26.89       3. Gait disorder  R26.9                   POC expires Unit limit Auth Expiration date PT/OT + Visit Limit? Co-Insurance   24 16 v 10/1-  No and $10 Co-pay                               Visit/Unit Tracking  AUTH Status:  Date 10/1 10/3 10/8 10/10 10/16 10/22 10/24 10/29 10/31 11/14 11/19    16 visits  Used 1 1 1 1 1 1 1 1 1 1 1     Remaining  15 14 13 12 11 10 9 8 7 6 5        Subjective: Patient presents to PT stating he cooked today!    As per Dr. Hernandez on 10/15/24: \"Hi - Just looked at his chart. Looks like he converted spontaneously. No restrictions\"    Objective: See treatment diary below    TA/NMR:   Vitals:   HR: 68 bpm  SpO2: 99%   BP: 128/72 mmHg    Per [employer] of [employer] PT: >180/110 mmHg at rest, or >250/115 mmHg with activity, need to stop activity and re-assess after 5 minutes.      - Sidestepping on foam beam w/ opp hand to knee taps: 7 laps   - Step-ups to medium RR w/ 5# TT: 20x  - Step taps to 8\" from foam pad: 30x  - STS w/ 5# TT: 20x , 2 sets  - Standing on foam pad while manipulating cones from floor to chair: 1 min  - Biodex Trainer   LoS, medium, 0UE: 9%, 7%    Assessment: Patient tolerated treatment well with continued focus on balance and endurance exercises. He was most challenged on compliant and uneven surfaces however, with improvements in postural sway and ankle strategies as he familiarized self to exercise suggesting good motor learning. Patient required intermittent seated rest periods due complaints of fatigue. Plan to continue to progress balance challenges to reduce risk for falls and promote independence. He will benefit from continues skilled OPPT services to maximize function and improve independence.     Plan: Continue per plan " of care.      Outcome Measures Initial Eval  10/1/24 PN  11/14/24   5xSTS 15.94 sec 11.63 sec   10 meter 10.03 ft/sec  1.00 m/s 1.71 m/s   FGA 15/30 20/30   DGI 13/24 17/24   mCTSIB  - FTEO (firm)  - FTEC (firm)  - FTEO (foam)  - FTEC (foam)   30 sec  30 sec +  30 sec +  2 sec   30 sec  30 sec  30 sec +  14 sec   6MWT defer 1350 ft   TUG  9.68 sec

## 2024-11-21 ENCOUNTER — OFFICE VISIT (OUTPATIENT)
Facility: CLINIC | Age: 74
End: 2024-11-21
Payer: COMMERCIAL

## 2024-11-21 DIAGNOSIS — R42 DIZZINESS: Primary | ICD-10-CM

## 2024-11-21 DIAGNOSIS — R26.89 IMBALANCE: ICD-10-CM

## 2024-11-21 DIAGNOSIS — R26.9 GAIT DISORDER: ICD-10-CM

## 2024-11-21 PROCEDURE — 97530 THERAPEUTIC ACTIVITIES: CPT

## 2024-11-21 PROCEDURE — 97112 NEUROMUSCULAR REEDUCATION: CPT

## 2024-11-21 NOTE — PROGRESS NOTES
"Daily Note     Today's date: 2024  Patient name: Regino Huffman  : 1950  MRN: [id number]  Referring provider: Blanco Rojo DO  Dx:   Encounter Diagnosis     ICD-10-CM    1. Dizziness  R42       2. Imbalance  R26.89       3. Gait disorder  R26.9                     POC expires Unit limit Auth Expiration date PT/OT + Visit Limit? Co-Insurance   24 16 v 10/1-  No and $10 Co-pay                               Visit/Unit Tracking  AUTH Status:  Date 10/1 10/3 10/8 10/10 10/16 10/22 10/24 10/29 10/31 11/14 11/19 11/21   16 visits  Used 1 1 1 1 1 1 1 1 1 1 1 1    Remaining  15 14 13 12 11 10 9 8 7 6 5 4       Subjective: Patient presents to PT stating he awakes at night ~8-10x to urinate which is really impacting his sleep.    As per Dr. Hernandez on 10/15/24: \"Hi - Just looked at his chart. Looks like he converted spontaneously. No restrictions\"    Objective: See treatment diary below    TA/NMR:   Vitals:   HR: 75 bpm  SpO2: 99%   BP: 126/80 mmHg    Per [employer]: >180/110 mmHg at rest, or >250/115 mmHg with activity, need to stop activity and re-assess after 5 minutes.      - STS w/ foam pad under feet: 15x, 2 sets  - Sidestepping on foam beam w/ opp hand to knee taps: 7 laps   - Step-ups to medium RR over 9\" cassia w/ 5# TT: 15x  - Step taps to chair: 20x    Pt education: Discussed participation with pelvic health to address nocturnal urinary urgency; patient in good verbal understanding and agreeability. Provided print out provided by Ami Wilcox PT, DPT and set up IE with therapist for mens health.     Assessment: Patient tolerated treatment well with continued focus on balance and endurance exercises. Patient was most challenged during single leg activities as he displayed increased M-L postural sway and one instance of lateral LoB which he was able to recover form independently. Discussed participation with pelvic health PT to address complaints of urinary urgency and " scheduled initial evaluation in December. He will benefit from continues skilled OPPT services to maximize function and improve independence.     Plan: Continue per plan of care.      Outcome Measures Initial Eval  10/1/24 PN  11/14/24   5xSTS 15.94 sec 11.63 sec   10 meter 10.03 ft/sec  1.00 m/s 1.71 m/s   FGA 15/30 20/30   DGI 13/24 17/24   mCTSIB  - FTEO (firm)  - FTEC (firm)  - FTEO (foam)  - FTEC (foam)   30 sec  30 sec +  30 sec +  2 sec   30 sec  30 sec  30 sec +  14 sec   6MWT defer 1350 ft   TUG  9.68 sec

## 2024-11-25 PROBLEM — Z00.00 MEDICARE ANNUAL WELLNESS VISIT, SUBSEQUENT: Status: RESOLVED | Noted: 2024-10-26 | Resolved: 2024-11-25

## 2024-11-26 ENCOUNTER — OFFICE VISIT (OUTPATIENT)
Facility: CLINIC | Age: 74
End: 2024-11-26
Payer: COMMERCIAL

## 2024-11-26 DIAGNOSIS — R42 DIZZINESS: Primary | ICD-10-CM

## 2024-11-26 DIAGNOSIS — R26.9 GAIT DISORDER: ICD-10-CM

## 2024-11-26 DIAGNOSIS — R26.89 IMBALANCE: ICD-10-CM

## 2024-11-26 PROCEDURE — 97112 NEUROMUSCULAR REEDUCATION: CPT

## 2024-11-26 PROCEDURE — 97530 THERAPEUTIC ACTIVITIES: CPT

## 2024-11-26 NOTE — PROGRESS NOTES
"Daily Note     Today's date: 2024  Patient name: Regino Huffman  : 1950  MRN: [id number]  Referring provider: Blanco Rojo DO  Dx:   Encounter Diagnosis     ICD-10-CM    1. Dizziness  R42       2. Imbalance  R26.89       3. Gait disorder  R26.9                       POC expires Unit limit Auth Expiration date PT/OT + Visit Limit? Co-Insurance   24 16 v 10/1-  No and $10 Co-pay                               Visit/Unit Tracking  AUTH Status:  Date 10/1 10/3 10/8 10/10 10/16 10/22 10/24 10/29 10/31 11/14 11/19 11/21 11/26   16 visits  Used 1 1 1 1 1 1 1 1 1 1 1 1 1    Remaining  15 14 13 12 11 10 9 8 7 6 5 4 3       Subjective: Patient presents to PT stating he did not sleep well last night and that his dog scratched his L arm, bandaged donned.     As per Dr. Hernandez on 10/15/24: \"Hi - Just looked at his chart. Looks like he converted spontaneously. No restrictions\"    Objective: See treatment diary below    TA/NMR:   Vitals:   HR: 62 bpm  SpO2: 99%   BP: 121/98 mmHg given water after 5 min rest, 118/86 mmHg    Per [employer]: >180/110 mmHg at rest, or >250/115 mmHg with activity, need to stop activity and re-assess after 5 minutes.      - Sidestepping on foam beam w/ 6\" hurdles: 7 laps  - Chair taps from foam beam: 20x  - STS w/ foam beam under feet to fatigue: 15x  - Tandem ambulation w/ 10#TT: 20ft, 3 laps  BP: 113/86 mmHg  - Step-ups to small RR over : 15x    Assessment: Patient tolerated treatment well with continued focus on balance and endurance exercises. Patient with initial high DBP which can likely be attributed to movement and conversation during reading as patient was asymptomatic and BP within normal range after 5 min rest. Patient with complaints of SoB following tandem ambulation, HR within exercise parameters and improvements in symptoms following seated rest period. He will benefit from continues skilled OPPT services to maximize function and improve " independence.     Plan: Continue per plan of care.  Re-eval next session for more visits.      Outcome Measures Initial Eval  10/1/24 PN  11/14/24   5xSTS 15.94 sec 11.63 sec   10 meter 10.03 ft/sec  1.00 m/s 1.71 m/s   FGA 15/30 20/30   DGI 13/24 17/24   mCTSIB  - FTEO (firm)  - FTEC (firm)  - FTEO (foam)  - FTEC (foam)   30 sec  30 sec +  30 sec +  2 sec   30 sec  30 sec  30 sec +  14 sec   6MWT defer 1350 ft   TUG  9.68 sec

## 2024-12-03 ENCOUNTER — APPOINTMENT (OUTPATIENT)
Facility: CLINIC | Age: 74
End: 2024-12-03
Payer: COMMERCIAL

## 2024-12-04 ENCOUNTER — OFFICE VISIT (OUTPATIENT)
Dept: PHYSICAL THERAPY | Facility: CLINIC | Age: 74
End: 2024-12-04
Payer: COMMERCIAL

## 2024-12-04 DIAGNOSIS — R35.1 NOCTURIA: Primary | ICD-10-CM

## 2024-12-04 DIAGNOSIS — R35.0 INCREASED URINARY FREQUENCY: ICD-10-CM

## 2024-12-04 PROCEDURE — 97530 THERAPEUTIC ACTIVITIES: CPT

## 2024-12-04 NOTE — LETTER
2024    Blanco Rojo DO  [address]  [address]  [place] [address]    Patient: Regino Huffman   YOB: 1950   Date of Visit: 2024     Encounter Diagnosis     ICD-10-CM    1. Nocturia  R35.1       2. Increased urinary frequency  R35.0           Dear Dr. Rojo:    Thank you for your recent referral of Regino Huffman. Please review the attached evaluation summary from Regino's recent visit.     Please verify that you agree with the plan of care by signing the attached order.     If you have any questions or concerns, please do not hesitate to call.     I sincerely appreciate the opportunity to share in the care of one of your patients and hope to have another opportunity to work with you in the near future.       Sincerely,    Zelda Ferrara      Referring Provider:      I certify that I have read the below Plan of Care and certify the need for these services furnished under this plan of treatment while under my care.                    Blanco Rojo DO  [address]  [place] [address]  Via In Basket          PT Re-Evaluation    Today's date: 2024  Patient name: Regino Huffman  : 1950  MRN: [id number]  Referring provider: Blanco Rojo DO  Dx:   Encounter Diagnosis     ICD-10-CM    1. Nocturia  R35.1       2. Increased urinary frequency  R35.0           Start Time: 0900  Stop Time: 0940  Total time in clinic (min): 40 minutes    Patient was co-treated by MARKOS Ferrara and Ami Wilcox PT, DPT under my direct supervision.     Assessment  Impairments: lacks appropriate home exercise program, poor posture , participation limitations and activity limitations  Symptom irritability: moderate    Assessment details: Regino Huffman is a 74 y.o. male  who presents to outpatient pelvic floor physical therapy with the following complaints: increased urinary frequency, nocturia. Patient's presentation is consistent with bladder  dysfunctional, supported by findings from the examination including: patient's subjective. This patient is functionally limited in interrupted sleep and having a strict water limit in fear of increasing nocturia. Regino Huffman demonstrates good rehab potential and would benefit from skilled pelvic floor physical therapy to address the above complaints and functional limitations.    Understanding of Dx/Px/POC: good     Prognosis: good    Goals  STG to be completed in 4 weeks:  Patient will demonstrate increased water intake to 80 oz/day  Patient will demonstrate proper sequencing of DB and PFMC  Patient will report 25% or better improvement in symptoms  Patient will report a bladder/water intake log.      LTG to be completed in 8 weeks:  Patient will demonstrate independence with comprehensive home exercise program.  Patient will report 75% or better improvement in symptoms  Patient will report a decrease of 5-7 visits to the bathroom at night.     Plan  Patient would benefit from: skilled physical therapy and PT eval  Planned modality interventions: biofeedback, electrical stimulation/Russian stimulation, cryotherapy, TENS, thermotherapy: hydrocollator packs, traction, ultrasound, unattended electrical stimulation and manual electrical stimulation    Planned therapy interventions: abdominal trunk stabilization, IASTM, joint mobilization, kinesiology taping, manual therapy, massage, Mckeon taping, body mechanics training, breathing training, neuromuscular re-education, patient/caregiver education, postural training, compression, coordination, dry needling, flexibility, graded activity, stretching, strengthening, therapeutic activities, therapeutic exercise, therapeutic training and home exercise program    Frequency: 1x week  Duration in weeks: 8  Plan of Care beginning date: 12/4/2024  Plan of Care expiration date: 1/29/2025  Treatment plan discussed with: patient      MATTHEW HUSSEIN SF PT [facility]OM[facility]ALTH [facility]  SUBJECTIVE EVAL:   History Of Present Illness:  Patient presents with a history of significant nocturia.    Urinary habits: Daytime voiding interval: every 3-4 hours. Able to start stream of urine, and characterizes it as a strong stream. 10-15x nocturia. Denies feelings of incomplete voiding. Denies pain with urination. Denies urinary incontinence of any kind-  Denies use of pads/diapers. Reports/denies recurrent UTIs/other infections. Tries to stop water intake around 5 pm, only small intake of water at dinner and with medication prior to bed.      Had kidneys stones - operated 2 or 3 times; operated on bladder (not sure what was done/outside of [facility])  Takes melatonin to sleep for a bit longer but still ends up going to void. Has portable urinal by bed side so he doesn't have to get up and go to the bathroom every time.    Fluid intake: Water: 40 oz, coffee 1 cup, smoothie of beets and carrots and lemon about 12 oz,     Diet and fiber intake: is trying to avoid sugars- Breakfast: banana, papaya, egg, coffee and toast, desai valladares, Lunch: watercress, tomato, lime, spinach etc, Dinner: spaghetti, meat sparingly, arroz    Activity level: neuro PT, walking but not in this cold weather, not too much more Occupation: retired    Bowel habits: 2-3 BM/day. Denies straining, constipation, pain with defecation. Reports type 4 stool using the BSS.     History of surgery: bladder surgery but unsure what type 3-4 years ago.       Objective      Insurance:  A/CMS Eval/ Re-eval POC expires OUTCOME MS Auth #/ Referral # Total    Start date  Expiration date Extension  Visit limitation?  PT only or  PT+OT? Co-Insurance   CMS 12/4/24 1/29/24                                                                        AUTH #:  Date               Authed: Used                Remaining                       Precautions: standard, history of dizziness/imbalance         12/4/24             VISIT 1 - IE             Manuals                PFM exam              Abdominal mobilizations             Colonic massage             Scar mobilization               Neuro Re-Ed               Neural reset               PFMC slow holds               360 breathing               DB               DB+PFMC              TA+PFMC               Bridges              Clamshells              Add squeeze              TA+march              Biofeedback                               Ther Ex               Sit to stand +PFMC               Lateral walks with band               Rows/Ext               SLR               Squats               Lunges               Walking               Paloff press               QPED- LE ext                - fire hydrant               - thread the needle               -cat/cow                               Sarai pose              Happy baby              Butterfly              Deep squat               SKTC/DKTC               LTR               Feet on wall                               Ther Activity               Voiding diary               Knack               Fiber intake education               Bowel habits education               Bladder irritants education Provided             Toileting posture              Urge deferral strategies Provided              Dilator/wand education              Increasing water intake education Provided              Mindfulness education                                      Attestation signed by Ami Wilcox PT at 12/4/2024 10:46 AM:  I supervised the visit.  We discussed the case to ensure appropriate continuation and progression of care and I reviewed the documentation.

## 2024-12-04 NOTE — PROGRESS NOTES
PT Re-Evaluation    Today's date: 2024  Patient name: Regino Huffman  : 1950  MRN: [id number]  Referring provider: Blanco Rojo DO  Dx:   Encounter Diagnosis     ICD-10-CM    1. Nocturia  R35.1       2. Increased urinary frequency  R35.0           Start Time: 0900  Stop Time: 0940  Total time in clinic (min): 40 minutes    Patient was co-treated by SPT Zelda Ferrara and Ami Wilcox PT, DPT under my direct supervision.     Assessment  Impairments: lacks appropriate home exercise program, poor posture , participation limitations and activity limitations  Symptom irritability: moderate    Assessment details: Regino Huffman is a 74 y.o. male  who presents to outpatient pelvic floor physical therapy with the following complaints: increased urinary frequency, nocturia. Patient's presentation is consistent with bladder dysfunctional, supported by findings from the examination including: patient's subjective. This patient is functionally limited in interrupted sleep and having a strict water limit in fear of increasing nocturia. Regino Huffman demonstrates good rehab potential and would benefit from skilled pelvic floor physical therapy to address the above complaints and functional limitations.    Understanding of Dx/Px/POC: good     Prognosis: good    Goals  STG to be completed in 4 weeks:  Patient will demonstrate increased water intake to 80 oz/day  Patient will demonstrate proper sequencing of DB and PFMC  Patient will report 25% or better improvement in symptoms  Patient will report a bladder/water intake log.      LTG to be completed in 8 weeks:  Patient will demonstrate independence with comprehensive home exercise program.  Patient will report 75% or better improvement in symptoms  Patient will report a decrease of 5-7 visits to the bathroom at night.     Plan  Patient would benefit from: skilled physical therapy and PT eval  Planned modality interventions: biofeedback, electrical  stimulation/American stimulation, cryotherapy, TENS, thermotherapy: hydrocollator packs, traction, ultrasound, unattended electrical stimulation and manual electrical stimulation    Planned therapy interventions: abdominal trunk stabilization, IASTM, joint mobilization, kinesiology taping, manual therapy, massage, Mckeon taping, body mechanics training, breathing training, neuromuscular re-education, patient/caregiver education, postural training, compression, coordination, dry needling, flexibility, graded activity, stretching, strengthening, therapeutic activities, therapeutic exercise, therapeutic training and home exercise program    Frequency: 1x week  Duration in weeks: 8  Plan of Care beginning date: 12/4/2024  Plan of Care expiration date: 1/29/2025  Treatment plan discussed with: patient      SL JOVITA SF PT [facility]OM[facility] SUBJECTIVE EVAL:   History Of Present Illness:  Patient presents with a history of significant nocturia.    Urinary habits: Daytime voiding interval: every 3-4 hours. Able to start stream of urine, and characterizes it as a strong stream. 10-15x nocturia. Denies feelings of incomplete voiding. Denies pain with urination. Denies urinary incontinence of any kind-  Denies use of pads/diapers. Reports/denies recurrent UTIs/other infections. Tries to stop water intake around 5 pm, only small intake of water at dinner and with medication prior to bed.      Had kidneys stones - operated 2 or 3 times; operated on bladder (not sure what was done/outside of [facility])  Takes melatonin to sleep for a bit longer but still ends up going to void. Has portable urinal by bed side so he doesn't have to get up and go to the bathroom every time.    Fluid intake: Water: 40 oz, coffee 1 cup, smoothie of beets and carrots and lemon about 12 oz,     Diet and fiber intake: is trying to avoid sugars- Breakfast: banana, papaya, egg, coffee and toast, desai valladares, Lunch: watercress, tomato, lime,  spinach etc, Dinner: spaghetti, meat sparingly, arroz    Activity level: neuro PT, walking but not in this cold weather, not too much more Occupation: retired    Bowel habits: 2-3 BM/day. Denies straining, constipation, pain with defecation. Reports type 4 stool using the BSS.     History of surgery: bladder surgery but unsure what type 3-4 years ago.       Objective      Insurance:  A/CMS Eval/ Re-eval POC expires OUTCOME MS Auth #/ Referral # Total    Start date  Expiration date Extension  Visit limitation?  PT only or  PT+OT? Co-Insurance   CMS 12/4/24 1/29/24                                                                        AUTH #:  Date               Authed: Used                Remaining                       Precautions: standard, history of dizziness/imbalance         12/4/24             VISIT 1 - IE             Manuals               PFM exam              Abdominal mobilizations             Colonic massage             Scar mobilization               Neuro Re-Ed               Neural reset               PFMC slow holds               360 breathing               DB               DB+PFMC              TA+PFMC               Bridges              Clamshells              Add squeeze              TA+march              Biofeedback                               Ther Ex               Sit to stand +PFMC               Lateral walks with band               Rows/Ext               SLR               Squats               Lunges               Walking               Paloff press               QPED- LE ext                - fire hydrant               - thread the needle               -cat/cow                               Sarai pose              Happy baby              Butterfly              Deep squat               SKTC/DKTC               LTR               Feet on wall                               Ther Activity               Voiding diary               Knack               Fiber intake education               Bowel habits  education               Bladder irritants education Provided             Toileting posture              Urge deferral strategies Provided              Dilator/wand education              Increasing water intake education Provided              Mindfulness education                                     Yes

## 2024-12-05 ENCOUNTER — OFFICE VISIT (OUTPATIENT)
Facility: CLINIC | Age: 74
End: 2024-12-05
Payer: COMMERCIAL

## 2024-12-05 DIAGNOSIS — R26.9 GAIT DISORDER: ICD-10-CM

## 2024-12-05 DIAGNOSIS — R26.89 IMBALANCE: ICD-10-CM

## 2024-12-05 DIAGNOSIS — R42 DIZZINESS: Primary | ICD-10-CM

## 2024-12-05 PROCEDURE — 97112 NEUROMUSCULAR REEDUCATION: CPT

## 2024-12-05 NOTE — PROGRESS NOTES
"Daily Note     Today's date: 2024  Patient name: Regino Huffman  : 1950  MRN: [id number]  Referring provider: Blanco Rojo DO  Dx:   Encounter Diagnosis     ICD-10-CM    1. Dizziness  R42       2. Imbalance  R26.89       3. Gait disorder  R26.9                         POC expires Unit limit Auth Expiration date PT/OT + Visit Limit? Co-Insurance   24 8v (split with PF) -  No and $10 Co-pay                               Visit/Unit Tracking  AUTH Status:  Date               8 visits  Used 1 PF 1 NPT               Remaining  7 6                  Subjective: Patient presents to PT stating he is feeling well with no new changes complaints or falls.    As per Dr. Hernandez on 10/15/24: \"Hi - Just looked at his chart. Looks like he converted spontaneously. No restrictions\"    Objective: See treatment diary below    TA/NMR:   Vitals:   HR: 71 bpm  SpO2: 95%   BP: 128/78 mmHg given water after 5 min rest, 118/86 mmHg    Per [employer]: >180/110 mmHg at rest, or >250/115 mmHg with activity, need to stop activity and re-assess after 5 minutes.      - Sidestepping on foam beam w/ 9\" hurdles: 7 laps  - Step taps to mat table from foam beam: 20x  - Kicking 10# med ball up ramp: 5x  - STS on medium RR to fatigue: 15x  - Fwd ambulation w/ 10# TT +HT/HN: 30ft, 4 laps ea    Assessment: Patient tolerated treatment well with continued focus on balance and endurance exercises. He continues to require intermittent seated rest periods due to fatigue and SoB; HR monitored throughout session with appropriate responses to exercise. Performed all exercises in open gym environment away from upper extremity support to improve lower extremity balance strategies. Improved hip and ankle strategies on compliant surfaces however, continues to require CS to complete tasks safely. He will benefit from continues skilled OPPT services to maximize function and improve independence.     Plan: Continue " per plan of care.       Outcome Measures Initial Eval  10/1/24 PN  11/14/24   5xSTS 15.94 sec 11.63 sec   10 meter 10.03 ft/sec  1.00 m/s 1.71 m/s   FGA 15/30 20/30   DGI 13/24 17/24   mCTSIB  - FTEO (firm)  - FTEC (firm)  - FTEO (foam)  - FTEC (foam)   30 sec  30 sec +  30 sec +  2 sec   30 sec  30 sec  30 sec +  14 sec   6MWT defer 1350 ft   TUG  9.68 sec

## 2024-12-10 ENCOUNTER — OFFICE VISIT (OUTPATIENT)
Facility: CLINIC | Age: 74
End: 2024-12-10
Payer: COMMERCIAL

## 2024-12-10 DIAGNOSIS — R26.9 GAIT DISORDER: ICD-10-CM

## 2024-12-10 DIAGNOSIS — R42 DIZZINESS: Primary | ICD-10-CM

## 2024-12-10 DIAGNOSIS — R26.89 IMBALANCE: ICD-10-CM

## 2024-12-10 PROCEDURE — 97112 NEUROMUSCULAR REEDUCATION: CPT

## 2024-12-10 NOTE — PROGRESS NOTES
"Daily Note     Today's date: 12/10/2024  Patient name: Regino Huffman  : 1950  MRN: [id number]  Referring provider: Blanco Rojo DO  Dx:   Encounter Diagnosis     ICD-10-CM    1. Dizziness  R42       2. Imbalance  R26.89       3. Gait disorder  R26.9                           POC expires Unit limit Auth Expiration date PT/OT + Visit Limit? Co-Insurance   24 8v (split with PF) -  No and $10 Co-pay                               Visit/Unit Tracking  AUTH Status:  Date 12/4 12/5 12/10             8 visits  Used 1 PF 1 NPT 1 NPT              Remaining  7 6 5                 Subjective: Patient presents to PT stating he made tamales with his family over the weekend!    As per Dr. Hernandez on 10/15/24: \"Hi - Just looked at his chart. Looks like he converted spontaneously. No restrictions\"    Objective: See treatment diary below    TA/NMR:   Vitals:   HR: 71 bpm  SpO2: 98%   BP: 112/70 mmHg     Per [employer]: >180/110 mmHg at rest, or >250/115 mmHg with activity, need to stop activity and re-assess after 5 minutes.      - Sidestepping on foam beam to medium RR: 10 laps  - Tandem on foam beam while holding 10# TT: 3 laps  - Stepping from balance pods to medium RR while listing eliz foods:  20x  - Kicking 10# med ball up ramp: 5x  - Posteriorly resisted cone weavin laps  - Step taps to table: 15x    Assessment: Patient tolerated treatment well with continued focus on balance and endurance exercises. Introduced dual tasking this date to further challenge patients balance and improve risk for falls. Patient required intermittent seated rest periods due to SoB and fatigued which improved following break. Maintained contact guard during high level activities secondary to increased A-P postural sway and decreased balance confidence during these tasks. He will benefit from continues skilled OPPT services to maximize function and improve independence.     Plan: Continue per plan of " care.       Outcome Measures Initial Eval  10/1/24 PN  11/14/24   5xSTS 15.94 sec 11.63 sec   10 meter 10.03 ft/sec  1.00 m/s 1.71 m/s   FGA 15/30 20/30   DGI 13/24 17/24   mCTSIB  - FTEO (firm)  - FTEC (firm)  - FTEO (foam)  - FTEC (foam)   30 sec  30 sec +  30 sec +  2 sec   30 sec  30 sec  30 sec +  14 sec   6MWT defer 1350 ft   TUG  9.68 sec

## 2024-12-12 ENCOUNTER — OFFICE VISIT (OUTPATIENT)
Facility: CLINIC | Age: 74
End: 2024-12-12
Payer: COMMERCIAL

## 2024-12-12 DIAGNOSIS — R26.89 IMBALANCE: ICD-10-CM

## 2024-12-12 DIAGNOSIS — R42 DIZZINESS: Primary | ICD-10-CM

## 2024-12-12 DIAGNOSIS — R26.9 GAIT DISORDER: ICD-10-CM

## 2024-12-12 PROCEDURE — 97112 NEUROMUSCULAR REEDUCATION: CPT | Performed by: PHYSICAL THERAPIST

## 2024-12-12 NOTE — PROGRESS NOTES
"Daily Note     Today's date: 2024  Patient name: Regino Huffman  : 1950  MRN: [id number]  Referring provider: Blanco Rojo DO  Dx:   Encounter Diagnosis     ICD-10-CM    1. Dizziness  R42       2. Imbalance  R26.89       3. Gait disorder  R26.9                             POC expires Unit limit Auth Expiration date PT/OT + Visit Limit? Co-Insurance   24 8v (split with PF) -  No and $10 Co-pay                               Visit/Unit Tracking  AUTH Status:  Date 12/4 12/5 12/10             8 visits  Used 1 PF 1 NPT 1 NPT              Remaining  7 6 5                 Subjective: Patient presents to PT reporting no new changes    As per Dr. Hernandez on 10/15/24: \"Hi - Just looked at his chart. Looks like he converted spontaneously. No restrictions\"    Objective: See treatment diary below    TA/NMR:     Per [employer]: >180/110 mmHg at rest, or >250/115 mmHg with activity, need to stop activity and re-assess after 5 minutes.        - STS w/ 5# TT: 20x   - Sidestepping on foam w/ 5# TT pass: 10 laps  - Tandem on foam w/ 5# TT: 10 laps  - Soccer passes from foam beam: 30x  - Marching w/ 10# TT: 5 laps x 20 ft  - Squats on foam beam w/ 10# TT: 20x      Assessment: Patient tolerated treatment well with continued focus on balance and endurance exercises. Foam beam and tidal tank used to provide patient with unstable surface and increased resistance to further challenge patient dynamic balance. Soccer passes added today with patient displaying occasional posterior LOB when in single leg stance, but able to recover independently with appropriate stepping reaction. Patient will benefit from continued skilled OPPT services to maximize function and improve independence.     Plan: Continue per plan of care.       Outcome Measures Initial Eval  10/1/24 PN  24   5xSTS 15.94 sec 11.63 sec   10 meter 10.03 ft/sec  1.00 m/s 1.71 m/s   FGA 15/30 20/30   DGI    mCTSIB  - FTEO " (firm)  - FTEC (firm)  - FTEO (foam)  - FTEC (foam)   30 sec  30 sec +  30 sec +  2 sec   30 sec  30 sec  30 sec +  14 sec   6MWT defer 1350 ft   TUG  9.68 sec

## 2024-12-16 ENCOUNTER — OFFICE VISIT (OUTPATIENT)
Dept: PHYSICAL THERAPY | Facility: CLINIC | Age: 74
End: 2024-12-16
Payer: COMMERCIAL

## 2024-12-16 DIAGNOSIS — R35.1 NOCTURIA: Primary | ICD-10-CM

## 2024-12-16 DIAGNOSIS — R35.0 INCREASED URINARY FREQUENCY: ICD-10-CM

## 2024-12-16 PROCEDURE — 97112 NEUROMUSCULAR REEDUCATION: CPT

## 2024-12-16 PROCEDURE — 97110 THERAPEUTIC EXERCISES: CPT

## 2024-12-16 NOTE — PROGRESS NOTES
Daily Note     Today's date: 2024  Patient name: Regino Huffman  : 1950  MRN: [id number]  Referring provider: Blanco Rojo DO  Dx:   Encounter Diagnosis     ICD-10-CM    1. Nocturia  R35.1       2. Increased urinary frequency  R35.0           Subjective: Patient notes that he has been drinking more water, and he has been going to the bathroom at night 4-5x less. Has been doing the diaphragmatic breathing. Has been trying to drink less water at the night time.      Objective: See treatment diary below      Assessment: Tolerated treatment well. Increased focus to use of diaphragmatic breathing with all exercises and activities today. Patient is responding well to fluid management as well as urge deferral techniques. Will continue to progress with pelvic floor mobility in further appointments but patient is progressing well with education and lifestyle changes. Patient exhibited good technique with therapeutic exercises and would benefit from continued PT to continue with symptom management.       Plan: Continue per plan of care.        AUTH #:  Date               Authed: Used                Remaining                  2024 - 2025   Referral Status: Authorized   Requested visits: 8   SHARING WITH NEURO PT    Precautions: standard, history of dizziness/imbalance         24           VISIT 1 - IE   SHARING W NEURO           Manuals               PFM exam              Abdominal mobilizations             Colonic massage             Scar mobilization               Neuro Re-Ed               Neural reset               PFMC slow holds               360 breathing               DB               DB+PFMC              TA+PFMC    swiss ball TA iso push downs with DB x15           Bridges   NV           SLR  With DB 2x10 each        Clamshells   H/l with DB and RTB 2x10            Add squeeze   2x10 with DB in elevated hooklying            TA+march   in elevated H/l with DB 2x10             Biofeedback                               Ther Ex               Sit to stand +[facility]               -cat/cow    NV?                           Sarai pose              Happy baby              Butterfly   with DB 3             Deep squat    figure 4 stretch with DB and gentle OP 4'           SKTC/DKTC               LTR               Feet on wall                               Ther Activity               Voiding diary               Knack               Fiber intake education               Bowel habits education               Bladder irritants education Provided             Toileting posture              Urge deferral strategies Provided              Dilator/wand education              Increasing water intake education Provided              Mindfulness education                                   Access Code: [email or web address]  URL: [email or web address]  Date: 12/16/2024  Prepared by: Ami Wilcox    Exercises  - Supine Hip Adduction Isometric with Ball  - 1 x daily - 7 x weekly - 3 sets - 10 reps  - Supine March  - 1 x daily - 7 x weekly - 3 sets - 10 reps  - Supine Straight Leg Raise with Pelvic Floor Contraction  - 1 x daily - 7 x weekly - 3 sets - 10 reps  - Hooklying Isometric Clamshell  - 1 x daily - 7 x weekly - 3 sets - 10 reps  - Butterfly Groin Stretch  - 1 x daily - 7 x weekly - 3 sets - 10 reps  - Supine Figure 4 Piriformis Stretch  - 1 x daily - 7 x weekly - 3 sets - 10 reps

## 2024-12-17 ENCOUNTER — EVALUATION (OUTPATIENT)
Facility: CLINIC | Age: 74
End: 2024-12-17
Payer: COMMERCIAL

## 2024-12-17 DIAGNOSIS — R26.9 GAIT DISORDER: ICD-10-CM

## 2024-12-17 DIAGNOSIS — R26.89 IMBALANCE: ICD-10-CM

## 2024-12-17 DIAGNOSIS — R42 DIZZINESS: Primary | ICD-10-CM

## 2024-12-17 PROCEDURE — 97530 THERAPEUTIC ACTIVITIES: CPT

## 2024-12-17 NOTE — PROGRESS NOTES
PT Re-Evaluation            POC expires Unit limit Auth Expiration date PT/OT + Visit Limit? Co-Insurance   3/11/25 8v (split with PF) -  No and $10 Co-pay                               Visit/Unit Tracking  AUTH Status:  Date 12/4 12/5 12/10 12/12 12/16 12/17          8 visits  Used 1 PF 1 NPT 1 NPT 1 NPT 1 PF 1 NPT           Remaining  7 6 5 4 3 2                     Today's date: 2024  Patient name: Regino Huffman  : 1950  MRN: [id number]  Referring provider: Blanco Rojo DO  Dx:   Encounter Diagnosis     ICD-10-CM    1. Dizziness  R42       2. Imbalance  R26.89       3. Gait disorder  R26.9                 Assessment  Assessment details: Patient is a 74 y.o. Male who has been presenting to skilled outpatient PT with complaints of dizziness and activity intolerance which is impacting his balance confidence and ability to ambulate in his community. Patient past medical history significant for kidney stone removal and urinary incontinence. He has recently commenced pelvic floor Physical Therapy to address nocturia and urinary incontinence. Since commencing skilled neuro based OPPT services dizziness symptoms have completely resolved thus, recent session have been centered on balance and endurance based exercises. He has displayed significant improvements in lower extremity strength, functional mobility, dynamic balance, and gait speed since commencing PT as per 5x STS, TUG, FGA, DGI, and 10 meter walk test respectively. He now categorizes as low risk for falls as per APTA and Rehab Measures Lab cutoff scores for FGA and DGI. Minor regression displayed in 6 minute walk test this date as compared to previous re-evaluation suggesting regression in cardiovascular endurance. He has met 3 additional goals this date and is progressing towards remaninig. Patient continues to be limited in his participation with household chores  and activities such as cooking due to tolerance which he would like to improve. Patient is highly motivated to participate with skilled OPPT services and will benefit from continued care to target limitations, decreased risk for falls, and improve overall QoL.   Patient verbalized understanding of POC.    Please contact me if you have any questions or recommendations. Thank you for the referral and the opportunity to share in Regino Huffman's care.      Cut off score   All date taken from APTA Neuro Section or Rehab Measures    DGI:  MDC for Vestibular Disorders: 4 points  MDC for Geriatrics/Community Dwelling Older Adults: 3 Points  Falls risk cut off: <19/24    FGA:  MCID: 4 points  Geriatrics/Community Dwelling Older Adults: </= 22/30 fall risk  Geriatrics/Community Dwelling Older Adults: </= 20/30 unexplained falls in the next 6 months  Parkinsons: </= 18/30 fall risk    mCTSIB (normed on ages 20-60, lower number is less sway or better static balance)  Eyes open firm surface (norm 0.21-0.48)  Eyes closed firm surface (norm 0.48-0.99)  Eyes open foam surface (norm 0.38-0.71)  Eyes closed foam surface (norm 0.70-2.22)    DHI:  0-39: low perception of handicap  40-69: moderate perception of handicap  : severe perception of handicap  > 60: increased risk for falls    Joint Position Error Testing (JPET):  > 4.5 degrees: abnormal joint proprioception        Impairments: Abnormal coordination, abnormal gait, abnormal muscle tone, abnormal or restricted ROM, activity intolerance, impaired balance, impaired physical strength, lacks appropriate HEP, poor posture, poor body mechanics, pain with function, safety issue, abnormal movement  Understanding of Dx/Px/POC: good  Prognosis: good      Goals    Vestibular Short Term Goals (4 weeks):  - Patient will be independent with simple HEP-MET  - Patient will tolerate 60 seconds of oculomotor exercises with minimal increase in symptoms-MET  - Patient will demonstrate 10%  decrease in symptom severity scoring with independent use of modalities-MET  - Patient will be able to tolerate 30 seconds with eyes closed on foam surface without any loss of balance demonstrating improvement in vestibular system-MET  - Patient will improve with DGI by 3 points per MDC to promote improved safety with dynamic tasks-MET  - Patient will improve FGA score by 4 points per MDC to promote improved safety with dynamic tasks-MET  - Patient will improve 5xSTS score by 2.3 seconds to promote improved LE functional strength needed for ADLs-MET    Vestibular Long Term Goals (12 weeks):  - Patient will be independent with complex HEP-ongoing  - Patient will tolerate >=2 minutes of oculomotor exercises to facilitate return to reading and computer work-MET  - Patient will report >= 50% improvement on symptom severity scoring-MET  - Patient will demonstrate ability to perform HT in gait without veering-ongoing  - Patient will be able to perform 15 minutes of aerobic activity at HR 70% max to facilitate return to sport/normal functional tasks-ongoing  - Patient will score low risk for falls with DGI test with score of 19/24 or higher per current research data-MET  - Patient will score low risk for falls with FGA test with score of 23/30 or higher per current research data-ongoing  - Patient will report baseline dizziness of 1/10 or less -MET  - Patient will report 2/10 dizziness or less with visual stimulating surround with duration of 2 minutes -MET  - Patient will report subjective improvement to 90% or higher to promote return to PLOF-MET  - Patient will improve 6MWT by MCID of 190 ft to indicate improvements in cardiovascular endurance and tolerance to activity- added this date      Plan  Plan details: vestibular rehab, functional strengthening, balance  Patient would benefit from: PT Eval and Skilled PT  Planned modality interventions: Biofeedback, Cryotherapy, TENS, Thermotherapy  Planned therapy  interventions: Abdominal trunk stabilization, ADL training, balance, balance/WB training, breathing training, body mechanics training, coordination, flexibility, functional ROM exercises, gait training, HEP, manual therapy, Mckeon Taping, motor coordination training, neuromuscular re-education, patient education, postural training, strengthening, stretching, therapeutic activities, therapeutic exercises, work re-integration  Frequency: 2x/wk  Duration in weeks: 12 weeks  Plan of Care beginning date: 12/17/24  Plan of Care expiration date: 3/11/25  Treatment plan discussed with: patient        Subjective Evaluation    History of Present Illness  Mechanism of injury: Patient presents to PT stating he has been experiencing dizziness he describes as swaying which throws him off balance making it difficult for him to walk. He states he notices his dizziness most following medication consumption. He states he had a recent change to medication which has helped the dizziness however continues to feel imbalance. Patient also reports he has noticed decreased tolerance to activity as he is unable to walk long distances and his tolerance to exercise has decreased. He states his greatest goal is to improve overall tolerance to activity in order to return to walking in the park.     Updated 11/14/24: Patient participation with therapy has been globally limited secondary to high blood pressure readings which have improved. He states dizziness does not occur often he does notice it when he first awakes and when descending stairs. He states his tolerance to activity has improved however, he does notice that he easily fatigues.     Updated 12/17/24: Patient presents to PT stating he feels he has been making steady gains in tolerance to activity as he has noticed he is able to participate in household chores and able to return to cooking. He states he continues to be limited in tolerance to prolonged activities as he continues to  "require rest periods.      Patient goal: tolerate more activity       Dizziness Subjective  How long does dizziness last: hour  How would you describe the dizziness: swaying dizziness   Rolling in bed: No  Supine to/from sit: Yes  Recent hearing loss: No  Tinnitus: No  Aural fullness/ear pain: No  Vision changes: Yes  History of migraines: Yes    Red Flag Screen  - Numbness: No  - Tingling: No  - Weakness: Yes  - Unilateral hearing loss: No  - Slurred speech: No  - Progressive hearing loss: No  - Tremors: No    Pain  NA     Social Support  Steps to enter house: 6 DARIANA  Stairs in house: 1 flight up  Lives in: multi-level home   Lives with: daughter and her family     Employment status: retired  Hand dominance: RHD    Treatments  Previous treatment: none  Current treatment: medication  Diagnostic Testing: none      Objective     Vestibular Objective  Cervical Spine AROM:  - Flexion: WFL no pain  - Extension: WFL no pain  - R Rotation: WFL no pain  - L Rotation: WFL no pain  - R Lateral Flexion: WFL pain at end range  - L Lateral Flexion: WFL no pain    Integrity Testing  - mVBI: normal  - Sharp Loni: normal  - Alar Stability Test: normal    Coordination Screen  - Dysmetria: normal  - Dysdiadochokinesia: normal    Oculomotor Screen  - Baseline Symptoms: 0/10  - Smooth Pursuits (central): H: Normal Dizziness: 0/10, Observation: difficulty tracking  - Saccades (central): H: Normal and V: Abnormal Dizziness: 0/10, Observation: vertical slowed  - Near Point Convergence (normal: < 4\"/10 cm - central): H: Normal Dizziness: 0/10, Observation: able to converge  - VORx1: H: Abnormal Dizziness: H:6/10 V:0/10, Observation: able to maintain gaze  - VOR Cancel (central): H: Normal Dizziness: H: 2/10, V:2/10 Observation: difficulty coordinating  - Head Thrust (moderate to severe hypofunction): H: Normal Dizziness: 0/10, Observation: difficulty maintaining gaze  - Head Shaking Test (mild hypofunction): H: Abnormal Dizziness: 6/10, " "Observation: no visible nystagmus    Oculomotor Screen 11/14/24  - Baseline Symptoms: 0/10  - Smooth Pursuits (central): H: Normal Dizziness: 0/10, Observation: difficulty tracking  - Saccades (central): H: Normal and V: Abnormal Dizziness: 0/10, Observation: vertical slowed  - Near Point Convergence (normal: < 4\"/10 cm - central): H: Normal Dizziness: 0/10, Observation: able to converge  - VORx1: H: Abnormal Dizziness: H:0/10 V:0/10, Observation: able to maintain gaze  - VOR Cancel (central): H: Normal Dizziness: H: 0/10, V:0/10 Observation: difficulty coordinating  - Head Shaking Test (mild hypofunction): H: Abnormal Dizziness: 4/10, Observation: no visible nystagmus    LE MMT  - R Hip Flexion: 3+/5  L Hip Flexion: 3+/5  - R Hip Extension: 4-/5  L Hip Extension: 4-/5  - R Hip Abduction: 4/5  L Hip Abduction: 4/5  - R Hip Adduction: 4-/5  L Hip Adduction: 4-/5  - R Knee Extension: 4-/5  L Knee Extension: 4-/5  - R Knee Flexion: 4/5  L Knee Flexion: 4/5  - R Ankle DF: 4-/5   L Ankle DF: 4-/5  - R Ankle PF: 4-/5   L Ankle PF: 4-/5    BP: 138/90 mmHg  HR: 65 bpm  SpO2: 94%  Outcome Measures Initial Eval  10/1/24 PN  11/14/24 PN  12/17/24   5xSTS 15.94 sec 11.63 sec 9.40 sec   10 meter 10.03 ft/sec  1.00 m/s 1.71 m/s 1.70 m/s   FGA 15/30 20/30 21/30   DGI 13/24 17/24 19/24   mCTSIB  - FTEO (firm)  - FTEC (firm)  - FTEO (foam)  - FTEC (foam)   30 sec  30 sec +  30 sec +  2 sec   30 sec  30 sec  30 sec +  14 sec   30 sec  30 sec  30 sec  21 sec   6MWT defer 1350 ft 1200 ft   TUG  9.68 sec 6.74 sec       Precautions:   Past Medical History:   Diagnosis Date    A-fib (HCC)     Arthritis     shoulder    BPH (benign prostatic hyperplasia)     Disease of thyroid gland     nodules    Hypertension     Kidney calculi     left    Kidney stone     Nocturia     Urinary incontinence     During the night, urinary frequency during the day.      "

## 2024-12-19 ENCOUNTER — OFFICE VISIT (OUTPATIENT)
Facility: CLINIC | Age: 74
End: 2024-12-19
Payer: COMMERCIAL

## 2024-12-19 DIAGNOSIS — R42 DIZZINESS: ICD-10-CM

## 2024-12-19 DIAGNOSIS — R26.9 GAIT DISORDER: ICD-10-CM

## 2024-12-19 DIAGNOSIS — R26.89 IMBALANCE: Primary | ICD-10-CM

## 2024-12-19 PROCEDURE — 97112 NEUROMUSCULAR REEDUCATION: CPT

## 2024-12-19 NOTE — PROGRESS NOTES
"Daily Note     Today's date: 2024  Patient name: Regino Huffman  : 1950  MRN: [id number]  Referring provider: Blanco Rojo DO  Dx:   Encounter Diagnosis     ICD-10-CM    1. Imbalance  R26.89       2. Dizziness  R42       3. Gait disorder  R26.9           POC expires Unit limit Auth Expiration date PT/OT + Visit Limit? Co-Insurance   3/11/25 8v (split with PF) -  No and $10 Co-pay                               Visit/Unit Tracking  AUTH Status:  Date 12/4 12/5 12/10 12/12 12/16 12/17 12/19         8 visits  Used 1 PF 1 NPT 1 NPT 1 NPT 1 PF 1 NPT 1 NPT          Remaining  7 6 5 4 3 2 1             Subjective: Patient presents to PT reporting no new changes, complaints, and falls.    As per Dr. Hernandez on 10/15/24: \"Hi - Just looked at his chart. Looks like he converted spontaneously. No restrictions\"    Objective: See treatment diary below    TA/NMR:   - STS w/ 5# TT: 20x   - Tandem stance on foam beam to Pball bounce: 2 min  - Sidestepping to bananagram making eliz words: 3 min  - Marching w/ 10# TT while playing scategories: 3 laps x 20 ft  - Bwd onto medium RR to tennis ball catch: 20x    Assessment: Patient tolerated treatment well with continued focus on balance and endurance exercises. Patient was most challenged with prolonged ambulation as he displayed SoB however, vitals remained appropriate for exercise. Continued with dual tasking to further challenge balance and promote lower extremity strategies to maintain balance. Consider inclusion of treadmill ambulation next session to further improve aerobic capacity and endurance. Patient will benefit from continued skilled OPPT services to maximize function and improve independence.     Plan: Continue per plan of care.       Outcome Measures Initial Eval  10/1/24 PN  24 PN  24   5xSTS 15.94 sec 11.63 sec 9.40 sec   10 meter 10.03 ft/sec  1.00 m/s 1.71 m/s 1.70 m/s   FGA 15/30 20/30 21/30   DGI  "   mCTSIB  - FTEO (firm)  - FTEC (firm)  - FTEO (foam)  - FTEC (foam)   30 sec  30 sec +  30 sec +  2 sec   30 sec  30 sec  30 sec +  14 sec   30 sec  30 sec  30 sec  21 sec   6MWT defer 1350 ft 1200 ft   TUG  9.68 sec 6.74 sec

## 2025-01-08 ENCOUNTER — OFFICE VISIT (OUTPATIENT)
Dept: PHYSICAL THERAPY | Facility: CLINIC | Age: 75
End: 2025-01-08
Payer: COMMERCIAL

## 2025-01-08 DIAGNOSIS — R35.0 INCREASED URINARY FREQUENCY: Primary | ICD-10-CM

## 2025-01-08 DIAGNOSIS — R35.1 NOCTURIA: ICD-10-CM

## 2025-01-08 PROCEDURE — 97530 THERAPEUTIC ACTIVITIES: CPT

## 2025-01-08 PROCEDURE — 97112 NEUROMUSCULAR REEDUCATION: CPT

## 2025-01-08 NOTE — PROGRESS NOTES
Daily Note -discharge    Today's date: 2025  Patient name: Regino Huffman  : 1950  MRN: [id number]  Referring provider: Blanco Rojo DO  Dx:   Encounter Diagnosis     ICD-10-CM    1. Increased urinary frequency  R35.0       2. Nocturia  R35.1             Subjective: Patient notes that his nocturia has decreased to 5x a night. During the day he voids every 3 hours.       Objective: See treatment diary below    Goals  STG to be completed in 4 weeks:  Patient will demonstrate increased water intake to 80 oz/day  ONGOING  Patient will demonstrate proper sequencing of DB and PFMC  MET  Patient will report 25% or better improvement in symptoms   MET  Patient will report a bladder/water intake log.    MET      LTG to be completed in 8 weeks:  Patient will demonstrate independence with comprehensive home exercise program. ONGOING  Patient will report 75% or better improvement in symptoms     MET  Patient will report a decrease of 5-7 visits to the bathroom at night.    MET      Assessment: Tolerated treatment well. HEP updated today to include more upper extremity strengthening with blue TB provided. Patient has done very well with pelvic floor PT goals thus far- his nocturia has improved from an average of 15 to 5 times per night. Patient was encouraged to continue with adequate hydration during the day and fluid cut off about 2 hours before bed time. Patient is appropriate for discharge to [facility] and was encouraged to call back with any questions.     Plan: discharge patient       2024 - 2025   Referral Status: Authorized   Requested visits: 8   SHARING WITH NEURO PT    Precautions: standard, history of dizziness/imbalance         24         VISIT 1 - IE   SHARING W NEURO           Manuals      see HEP review below         PFM exam              Abdominal mobilizations             Colonic massage             Scar mobilization               Neuro Re-Ed                Neural reset               PFMC slow holds               360 breathing               DB               DB+PFMC              TA+PFMC    swiss ball TA iso push downs with DB x15           Bridges   NV           SLR  With DB 2x10 each        Clamshells   H/l with DB and RTB 2x10            Add squeeze   2x10 with DB in elevated hooklying            TA+march   in elevated H/l with DB 2x10            Biofeedback                               Ther Ex               Sit to stand +PFMC               -cat/cow    NV?                           Sarai pose              Happy baby              Butterfly   with DB 3             Deep squat    figure 4 stretch with DB and gentle OP 4'           SKTC/DKTC               LTR               Feet on wall                               Ther Activity               Voiding diary               Knack               Fiber intake education               Bowel habits education               Bladder irritants education Provided             Toileting posture              Urge deferral strategies Provided              Dilator/wand education              Increasing water intake education Provided              Mindfulness education                                   Access Code: [email or web address]  URL: [email or web address]  Date: 12/16/2024  Prepared by: Ami Wilcox    Exercises  - Supine Hip Adduction Isometric with Ball  - 1 x daily - 7 x weekly - 3 sets - 10 reps  - Supine March  - 1 x daily - 7 x weekly - 3 sets - 10 reps  - Supine Straight Leg Raise with Pelvic Floor Contraction  - 1 x daily - 7 x weekly - 3 sets - 10 reps  - Hooklying Isometric Clamshell  - 1 x daily - 7 x weekly - 3 sets - 10 reps  - Butterfly Groin Stretch  - 1 x daily - 7 x weekly - 3 sets - 10 reps  - Supine Figure 4 Piriformis Stretch  - 1 x daily - 7 x weekly - 3 sets - 10 reps    Access Code: [email or web address]  URL: [email or web address]  Date: 01/08/2025  Prepared by: Ami Wilcox    Exercises  - Seated  Shoulder Row with Resistance Anchored at Feet  - 1 x daily - 7 x weekly - 3 sets - 10 reps  - Seated Alternating Shoulder Row with Resistance Anchored at Feet  - 1 x daily - 7 x weekly - 3 sets - 10 reps  - Seated Single Arm Bent Over Shoulder Row with Dumbbell  - 1 x daily - 7 x weekly - 3 sets - 10 reps  - Standing Row with Anchored Resistance  - 1 x daily - 7 x weekly - 3 sets - 10 reps  - Standing High Row with Resistance  - 1 x daily - 7 x weekly - 3 sets - 10 reps  - Standing Upright Shoulder Row with Resistance  - 1 x daily - 7 x weekly - 3 sets - 10 reps  - Shoulder External Rotation and Scapular Retraction with Resistance  - 1 x daily - 7 x weekly - 3 sets - 10 reps  - Seated Shoulder Horizontal Abduction with Resistance  - 1 x daily - 7 x weekly - 3 sets - 10 reps  - Seated Alternating Bicep Curls Neutral with Dumbbells  - 1 x daily - 7 x weekly - 3 sets - 10 reps  - Standing Bicep Curls with Resistance  - 1 x daily - 7 x weekly - 3 sets - 10 reps

## 2025-01-10 ENCOUNTER — HOSPITAL ENCOUNTER (OUTPATIENT)
Dept: RADIOLOGY | Facility: HOSPITAL | Age: 75
Discharge: HOME | End: 2025-01-10
Payer: COMMERCIAL

## 2025-01-10 DIAGNOSIS — E04.2 MULTIPLE THYROID NODULES: ICD-10-CM

## 2025-01-10 PROCEDURE — 76536 US EXAM OF HEAD AND NECK: CPT

## 2025-01-15 ENCOUNTER — APPOINTMENT (OUTPATIENT)
Dept: PHYSICAL THERAPY | Facility: CLINIC | Age: 75
End: 2025-01-15
Payer: COMMERCIAL

## 2025-01-15 ENCOUNTER — RESULTS FOLLOW-UP (OUTPATIENT)
Dept: OTOLARYNGOLOGY | Facility: CLINIC | Age: 75
End: 2025-01-15

## 2025-02-03 NOTE — PROGRESS NOTES
Assessment/Plan:    No problem-specific Assessment & Plan notes found for this encounter  Keep cardio f/u for PAF  Cont meds  Avoid nsaids    htn stable    F/u 3m    Prediabetes, continue to watch carb intake    Stay hydrated  Restart exercise program, walking    Because Eliquis is a blood thinner, you should not take any ibuprofen or naproxen with it because you could bleed too much  It is ok to take tylenol with eliquis  Diagnoses and all orders for this visit:    PAF (paroxysmal atrial fibrillation) (HCC)    Essential hypertension  -     amLODIPine (NORVASC) 10 mg tablet; Take 1 tablet (10 mg total) by mouth daily    Prediabetes              Return in about 3 months (around 4/6/2023) for Recheck  Subjective:      Patient ID: Blanquita Sosa is a 67 y o  male  Chief Complaint   Patient presents with   • Transition of Care Management     JMoyleLPN       HPI  Some carbs, pasta, limits, rice  More water now  New AF due to illness, back to [facility], on noac and toprol, appt with cardio     TCM Call     Date and time call was made  12/30/2022  1:49 PM    Hospital care reviewed  Records reviewed    Patient was hospitialized at  [address] [place] [address]    Date of Admission  12/23/22    Date of discharge  12/24/22    Diagnosis  Atrial fibrillation with rapid ventricular response    Disposition  Home    Were the patients medications reviewed and updated  No    Current Symptoms  None      TCM Call     Post hospital issues  None    Should patient be enrolled in anticoag monitoring? No    Scheduled for follow up?   Yes    Patients specialists  Cardiologist    Cardiologist name  Dr Bobby Novoa    Did you obtain your prescribed medications  Yes    Do you need help managing your prescriptions or medications  No    Is transportation to your appointment needed  No    I have advised the patient to call PCP with any new or worsening symptoms  Juan Peng patient aware to go back to [address] Patient left the virtual call prior to being seen. Do not bill.   Romi Bennett, RN   Registered Nurse   Specialty:  Registered Nurse   Progress Notes   Incomplete   Date of Service:  4/20/2021  5:00 PM               Incomplete             GROUP Note      Title of Group:  Medication group     Purpose/ Goal of Group: To learn how to take medications safely.     Length of Group:  45 minutes     Response to Group: (Affect, Concentration/Attention, Interaction, Ability to perform task, Ability to discuss issues in group):  Quiet during group, attentive and focused on task to create medication brochure.  Shared brochure with group.                    System  Neighboors; Family    The type of support provided  Emotional; Financial    Do you have social support  Yes, as much as I need    Are you recieving any outpatient services  No    Are you recieving home care services  No    Are you using any community resources  No    Current waiver services  No    Have you fallen in the last 12 months  No    Interperter language line needed  Yes    Counseling  Family    Counseling topics  Activities of daily living          The following portions of the patient's history were reviewed and updated as appropriate: allergies, current medications, past family history, past medical history, past social history, past surgical history and problem list     Review of Systems   Constitutional: Negative for chills and fever  Gastrointestinal: Negative for blood in stool  Genitourinary: Negative for hematuria  Neurological: Negative for syncope  Current Outpatient Medications   Medication Sig Dispense Refill   • amLODIPine (NORVASC) 10 mg tablet Take 1 tablet (10 mg total) by mouth daily 90 tablet 1   • apixaban (ELIQUIS) 5 mg Take 1 tablet (5 mg total) by mouth 2 (two) times a day 60 tablet 1   • ergocalciferol (VITAMIN D2) 50,000 units Take 1 capsule (50,000 Units total) by mouth once a week 4 capsule 0   • metoprolol succinate (TOPROL-XL) 25 mg 24 hr tablet Take 1 tablet (25 mg total) by mouth daily at bedtime 30 tablet 1     No current facility-administered medications for this visit  Objective:    /80   Pulse 84   Temp (!) 97 °F (36 1 °C)   Resp 20   Ht 5' 6" (1 676 m)   Wt 73 5 kg (162 lb)   BMI 26 15 kg/m²        Physical Exam  Vitals and nursing note reviewed  Constitutional:       General: He is not in acute distress  Appearance: He is well-developed  He is not ill-appearing  HENT:      Head: Normocephalic  Right Ear: Tympanic membrane normal       Left Ear: Tympanic membrane normal    Eyes:      General: No scleral icterus  Conjunctiva/sclera: Conjunctivae normal    Cardiovascular:      Rate and Rhythm: Normal rate and regular rhythm  Pulmonary:      Effort: Pulmonary effort is normal  No respiratory distress  Breath sounds: No wheezing  Abdominal:      Palpations: Abdomen is soft  Tenderness: There is no abdominal tenderness  Musculoskeletal:         General: No deformity  Cervical back: Neck supple  Skin:     General: Skin is warm and dry  Coloration: Skin is not pale  Findings: Bruising present  Comments: arms   Neurological:      Mental Status: He is alert  Motor: No weakness  Gait: Gait normal    Psychiatric:         Behavior: Behavior normal          Thought Content:  Thought content normal                 Julia Champion DO

## 2025-02-04 ENCOUNTER — HOSPITAL ENCOUNTER (OUTPATIENT)
Dept: SLEEP CENTER | Facility: CLINIC | Age: 75
Discharge: HOME | End: 2025-02-04
Payer: COMMERCIAL

## 2025-02-04 DIAGNOSIS — I50.32 CHRONIC DIASTOLIC HEART FAILURE (HCC): ICD-10-CM

## 2025-02-04 DIAGNOSIS — I48.0 PAF (PAROXYSMAL ATRIAL FIBRILLATION) (HCC): ICD-10-CM

## 2025-02-04 DIAGNOSIS — I10 ESSENTIAL HYPERTENSION: ICD-10-CM

## 2025-02-04 DIAGNOSIS — R06.83 SNORING: ICD-10-CM

## 2025-02-04 DIAGNOSIS — G47.19 EXCESSIVE DAYTIME SLEEPINESS: ICD-10-CM

## 2025-02-04 PROCEDURE — 95810 POLYSOM 6/> YRS 4/> PARAM: CPT

## 2025-02-04 PROCEDURE — 95810 POLYSOM 6/> YRS 4/> PARAM: CPT | Performed by: STUDENT IN AN ORGANIZED HEALTH CARE EDUCATION/TRAINING PROGRAM

## 2025-02-05 ENCOUNTER — DOCUMENTATION (OUTPATIENT)
Dept: SLEEP CENTER | Facility: CLINIC | Age: 75
End: 2025-02-05

## 2025-02-05 PROBLEM — R06.83 SNORING: Status: ACTIVE | Noted: 2025-02-05

## 2025-02-05 PROBLEM — G47.33 OBSTRUCTIVE SLEEP APNEA: Status: ACTIVE | Noted: 2025-02-05

## 2025-02-05 NOTE — PROGRESS NOTES
Patient with recent diagnostic in lab sleep study.    Sleep study revealed moderate obstructive sleep apnea.    I have messaged clinical sleep pool to contact patient regarding study results and to schedule follow-up to discuss treatment.

## 2025-02-11 ENCOUNTER — OFFICE VISIT (OUTPATIENT)
Dept: OTOLARYNGOLOGY | Facility: CLINIC | Age: 75
End: 2025-02-11
Payer: COMMERCIAL

## 2025-02-11 VITALS
WEIGHT: 142 LBS | HEART RATE: 51 BPM | BODY MASS INDEX: 22.82 KG/M2 | HEIGHT: 66 IN | TEMPERATURE: 97.5 F | OXYGEN SATURATION: 98 %

## 2025-02-11 DIAGNOSIS — E04.2 MULTIPLE THYROID NODULES: Primary | ICD-10-CM

## 2025-02-11 DIAGNOSIS — H61.23 BILATERAL IMPACTED CERUMEN: ICD-10-CM

## 2025-02-11 PROCEDURE — 69210 REMOVE IMPACTED EAR WAX UNI: CPT | Performed by: NURSE PRACTITIONER

## 2025-02-11 PROCEDURE — 99214 OFFICE O/P EST MOD 30 MIN: CPT | Performed by: NURSE PRACTITIONER

## 2025-02-11 NOTE — PATIENT INSTRUCTIONS
Throat - salt water gargles, hot tea, voice rest, lozenges.     Thyroid ultrasound one year    Wax care at home - avoidance of q-tips, may use cerumen softeners every one to two months.  Hydrocortisone cream pea sized amount on finger as needed for itching in ears.

## 2025-02-11 NOTE — PROGRESS NOTES
Assessment/Plan:      Diagnoses and all orders for this visit:    Multiple thyroid nodules  -     US thyroid; Future    Bilateral impacted cerumen    Other orders  -     Ear cerumen removal          Use of  via I pad for in depth discussion     TSh level 0.523 - 10/2024    Thyroid US 01/2025 indicating   Right lobe: 4.5 x 2.9 x 2.3 cm. Volume 14.6 mL  Left lobe: 4.4 x 1.8 x 2.0 cm. Volume 7.5 mL  Isthmus: 0.5 cm.  RIGHT midgland nodule measuring 1.2 x 1.5 x 1.0 cm. Unchanged from prior. TR 4 (4-6 points). FNA if >/= 1.5 cm. Follow if >/= 1 cm. This nodule has had a previous benign biopsy (Irvington III, Afirma GSC Benign molecular testing 3/9/2022). As it is stable, no further sampling recommended at this time.   Further surveillance at 2 year intervals could be considered to confirm continued stability for a period of greater than 5 years.     LEFT lower pole nodule measuring 1.1 x 0.9 x 0.9 cm. Unchanged from prior. TR 3 (3 points). FNA if >/= 2.5 cm. Follow if >/= 1.5 cm. This nodule has had previous biopsy (Irvington 3, 10/25/2021). Further surveillance at 2 year intervals could be considered to confirm continued stability for a period of greater than 5 years.   ---NOTE--- discrepancy with radiology statement, the left nodule has not been biopsied as it has not met EZRA or T rads guidelines. The right nodule was biopsied twice.      FNAB 10/2021 of the Right nodule indicating Irvington III NO afirma tested at that time, repeat FNAB 03/2022 of Right nodule Irvington III with Afirma revealed Benign     Discussed Irvington rating system, EZRA guidelines, and indications for further interventions.  Reviewed options for treatment including repeat ultrasound in one year, repeat FNAB of the nodule with Afirma [facility].  Surgery not currently indicated.  Based on guidelines for EZRA, thyroid us in year or two years is reasonable due to FNA afirma Benign.       After discussion agreed to repeat thyroid US in one  year  Follow up after testing January 2026     Bilateral impacted cerumen     On exam noted bilateral cerumen impaction and unable to fully view tympanic membrane.  Cerumen impaction removed bilateral eac with #5 suction, pt tolerated procedure well.  Upon removal, improved hearing and decreased clogged sensation of bilateral ears.    Encourage ongoing follow up annually to monitor for cerumen and hearing.      Chronic cough  Discussed nature of chronic cough and may be multi-factorial.  Discussed viral illness, dryness, vs other processes that may impact cough.  Reviewed options including voice rest,   Throat - salt water gargles, hot tea, voice rest, lozenges.    Follow up annually to monitor pt progression of concerns    Subjective:     Patient ID: Regino Huffman is a 74 y.o. male.    Presents today for follow up due to thyroid nodules and blocked ears.  Recent thyroid us and here to review results. Occasional neck and shoulder pain. Additional concerns include throat clearing and hoarseness of voice. Slight cough.      Use of  via I pad for in depth discussion          Review of Systems   Constitutional: Negative.    HENT:  Negative for congestion, ear discharge, ear pain, hearing loss, nosebleeds, postnasal drip, rhinorrhea, sinus pressure, sinus pain, sore throat, tinnitus and voice change.    Respiratory:  Negative for chest tightness and shortness of breath.    Skin:  Negative for color change.   Neurological:  Negative for dizziness, numbness and headaches.   Psychiatric/Behavioral: Negative.           Objective:     Physical Exam  Constitutional:       Appearance: He is well-developed.   HENT:      Head: Normocephalic.      Right Ear: Hearing, tympanic membrane, ear canal and external ear normal. No decreased hearing noted. No drainage or tenderness. There is impacted cerumen. Tympanic membrane is not perforated or erythematous.      Left Ear: Hearing, tympanic membrane, ear canal and external  ear normal. No decreased hearing noted. No drainage or tenderness. There is impacted cerumen. Tympanic membrane is not perforated or erythematous.      Nose: Septal deviation present. No nasal deformity.      Mouth/Throat:      Mouth: Mucous membranes are not pale and not dry. No oral lesions.      Dentition: Normal dentition.      Pharynx: Uvula midline. No oropharyngeal exudate.   Neck:      Trachea: No tracheal deviation.   Pulmonary:      Effort: No accessory muscle usage or respiratory distress.   Musculoskeletal:      Cervical back: Neck supple.   Lymphadenopathy:      Cervical: No cervical adenopathy.   Skin:     General: Skin is warm and dry.   Neurological:      Mental Status: He is alert and oriented to person, place, and time.      Cranial Nerves: No cranial nerve deficit.      Sensory: No sensory deficit.   Psychiatric:         Behavior: Behavior is cooperative.         Ear cerumen removal    Date/Time: 2/11/2025 9:00 AM    Performed by: SARA Olvera  Authorized by: SARA Olvera  Universal Protocol:  procedure performed by consultantConsent: Verbal consent obtained.  Risks and benefits: risks, benefits and alternatives were discussed  Consent given by: patient  Patient understanding: patient states understanding of the procedure being performed    Patient location:  Clinic  Procedure details:     Local anesthetic:  None    Location:  L ear and R ear    Approach:  External  Post-procedure details:     Complication:  None    Hearing quality:  Normal    Patient tolerance of procedure:  Tolerated well, no immediate complications

## 2025-02-14 ENCOUNTER — TELEPHONE (OUTPATIENT)
Dept: SLEEP CENTER | Facility: CLINIC | Age: 75
End: 2025-02-14

## 2025-02-14 NOTE — TELEPHONE ENCOUNTER
Sleep study resulted and shows moderate sleep apnea worse in supine position. Mild to moderate event related desaturations noted. AHI 21.8.  Follow up with ordering provider recommended.     Results ready by patient.     Call to patient using  #[phone] Rafia.     Called patient and left message advising I will send a MyChart message with the sleep study results and next steps.  Provided sleep center number([phone]) to call if any questions.

## 2025-02-18 ENCOUNTER — OFFICE VISIT (OUTPATIENT)
Dept: CARDIOLOGY CLINIC | Facility: CLINIC | Age: 75
End: 2025-02-18
Payer: COMMERCIAL

## 2025-02-18 VITALS
SYSTOLIC BLOOD PRESSURE: 118 MMHG | HEIGHT: 66 IN | HEART RATE: 53 BPM | DIASTOLIC BLOOD PRESSURE: 86 MMHG | BODY MASS INDEX: 26.68 KG/M2 | OXYGEN SATURATION: 96 % | WEIGHT: 166 LBS

## 2025-02-18 DIAGNOSIS — G47.33 OSA (OBSTRUCTIVE SLEEP APNEA): ICD-10-CM

## 2025-02-18 DIAGNOSIS — E78.49 OTHER HYPERLIPIDEMIA: ICD-10-CM

## 2025-02-18 DIAGNOSIS — I10 ESSENTIAL HYPERTENSION: ICD-10-CM

## 2025-02-18 DIAGNOSIS — I50.32 CHRONIC DIASTOLIC HEART FAILURE (HCC): ICD-10-CM

## 2025-02-18 DIAGNOSIS — I48.0 PAF (PAROXYSMAL ATRIAL FIBRILLATION) (HCC): Primary | ICD-10-CM

## 2025-02-18 PROCEDURE — 99214 OFFICE O/P EST MOD 30 MIN: CPT | Performed by: INTERNAL MEDICINE

## 2025-02-18 PROCEDURE — 93000 ELECTROCARDIOGRAM COMPLETE: CPT | Performed by: INTERNAL MEDICINE

## 2025-02-18 RX ORDER — TELMISARTAN 20 MG/1
20 TABLET ORAL DAILY
Qty: 30 TABLET | Refills: 2 | Status: SHIPPED | OUTPATIENT
Start: 2025-02-18

## 2025-02-18 NOTE — PROGRESS NOTES
Subjective:     Regino Huffman is a 74 y.o. male  who presents to the office today for follow up of atrial fibrillation and hypertension.    He was last seen by NP in November 2024.  BP was elevated and amlodipine was restarted.  BP better today.  In October 2024, he was admitted to [facility] with shortness of breath and palpitations and was found to be in atrial fibrillation rapid ventricular response.  He was placed on diltiazem and subsequently converted back to sinus rhythm.  No further episodes since then.    In January 2023, patient was admitted to [facility] with COVID 19 infection.  In the ER, he was noted to be in atrial fibrillation with RVR.   He converted to sinus rhythm after receiving cardizem.  BNP was elevated and he was given IV lasix.  He felt significantly better the following day.  He was discharged home with metoprolol 25 mg daily along with Eliquis along with amlodipine 10 mg daily.  During his last visit, metoprolol was increased to 50 mg daily.  Holter monitor was done which showed sinus rhythm.     The following portions of the patient's history were reviewed and updated as appropriate: allergies, current medications, past family history, past medical history, past social history, past surgical history and problem list.    Review of Systems  Review of Systems   Respiratory:  Negative for chest tightness and shortness of breath.    Cardiovascular:  Positive for leg swelling. Negative for chest pain and palpitations.   Musculoskeletal:  Positive for arthralgias.   All other systems reviewed and are negative.       Current Outpatient Medications on File Prior to Visit   Medication Sig Dispense Refill    apixaban (ELIQUIS) 5 mg Take 1 tablet (5 mg total) by mouth 2 (two) times a day 180 tablet 1    atorvastatin (LIPITOR) 20 mg tablet Take 1 tablet (20 mg total) by mouth daily with dinner 90 tablet 1    Calcium Citrate-Vitamin D 250 mg-2.5 mcg tablet Take 1 tablet  "by mouth 2 (two) times a day      metoprolol tartrate (LOPRESSOR) 50 mg tablet Take 1 tablet (50 mg total) by mouth every 12 (twelve) hours 180 tablet 1    [DISCONTINUED] amLODIPine (NORVASC) 5 mg tablet Take 1 tablet (5 mg total) by mouth daily 90 tablet 1     No current facility-administered medications on file prior to visit.          Objective:      Physical Exam  /86 (BP Location: Left arm, Patient Position: Sitting, Cuff Size: Standard)   Pulse (!) 53   Ht 5' 6\" (1.676 m)   Wt 75.3 kg (166 lb)   SpO2 96%   BMI 26.79 kg/m²    Physical Exam   Constitutional: He appears healthy. No distress.   Eyes: Pupils are equal, round, and reactive to light. Conjunctivae are normal.   Neck: No JVD present.   Cardiovascular: Normal rate, regular rhythm and normal heart sounds. Exam reveals no gallop and no friction rub.   No murmur heard.  Pulmonary/Chest: Effort normal and breath sounds normal. He has no wheezes. He has no rales.   Musculoskeletal:         General: Edema present. No tenderness or deformity.      Cervical back: Normal range of motion and neck supple.   Neurological: He is alert and oriented to person, place, and time.   Skin: Skin is warm and dry.        Cardiographics  ECG: NSR with RBBB  Echocardiogram: normal and reviewed by myself     Lab Review   Lab Results   Component Value Date    K 3.8 10/14/2024     10/14/2024    CO2 25 10/14/2024    BUN 20 10/14/2024    CREATININE 1.01 10/14/2024    CALCIUM 8.5 10/14/2024     Lab Results   Component Value Date    WBC 9.13 10/13/2024    HGB 15.4 10/13/2024    HCT 47.4 10/13/2024    MCV 99 (H) 10/13/2024     10/13/2024     Lab Results   Component Value Date    TRIG 192 (H) 06/26/2024    HDL 74 06/26/2024          Assessment:     1. PAF (paroxysmal atrial fibrillation) (HCC)    2. Essential hypertension    3. Chronic diastolic heart failure (HCC)    4. Other hyperlipidemia    5. LISA (obstructive sleep apnea)              Plan:      1.  Continue " metoprolol 50 mg twice daily   2.  He has bilateral lower extremity edema present today.  Will discontinue amlodipine and begin telmisartan 20 mg daily.   may use alternate ARB if telmisartan not preferred on formulary.   3.  Continue Eliquis   4.  Continue atorvastatin 20 mg daily   5.  He had sleep study ordered by nurse practitioner which was abnormal with moderate obstructive sleep apnea.  Will refer to sleep specialist to initiate CPAP therapy.

## 2025-03-04 ENCOUNTER — CLINICAL SUPPORT (OUTPATIENT)
Dept: CARDIOLOGY CLINIC | Facility: CLINIC | Age: 75
End: 2025-03-04
Payer: COMMERCIAL

## 2025-03-04 VITALS
SYSTOLIC BLOOD PRESSURE: 126 MMHG | WEIGHT: 166 LBS | HEART RATE: 62 BPM | DIASTOLIC BLOOD PRESSURE: 78 MMHG | HEIGHT: 66 IN | OXYGEN SATURATION: 97 % | BODY MASS INDEX: 26.68 KG/M2

## 2025-03-04 DIAGNOSIS — I48.0 PAF (PAROXYSMAL ATRIAL FIBRILLATION) (HCC): Primary | ICD-10-CM

## 2025-03-04 PROCEDURE — 99211 OFF/OP EST MAY X REQ PHY/QHP: CPT

## 2025-03-04 NOTE — PROGRESS NOTES
Pt came in for BP check after stopping amlodipine and starting telmisartan. Per Dr powers, pt to continue current medications.

## 2025-03-08 DIAGNOSIS — I10 ESSENTIAL HYPERTENSION: ICD-10-CM

## 2025-03-08 DIAGNOSIS — I48.91 ATRIAL FIBRILLATION WITH RAPID VENTRICULAR RESPONSE (HCC): ICD-10-CM

## 2025-03-10 RX ORDER — TELMISARTAN 20 MG/1
20 TABLET ORAL DAILY
Qty: 30 TABLET | Refills: 0 | OUTPATIENT
Start: 2025-03-10

## 2025-04-08 DIAGNOSIS — E78.49 OTHER HYPERLIPIDEMIA: ICD-10-CM

## 2025-04-09 RX ORDER — ATORVASTATIN CALCIUM 20 MG/1
20 TABLET, FILM COATED ORAL
Qty: 90 TABLET | Refills: 1 | Status: SHIPPED | OUTPATIENT
Start: 2025-04-09

## 2025-04-20 PROBLEM — N20.0 KIDNEY STONES: Status: ACTIVE | Noted: 2021-07-17

## 2025-04-23 ENCOUNTER — TELEPHONE (OUTPATIENT)
Dept: FAMILY MEDICINE CLINIC | Facility: CLINIC | Age: 75
End: 2025-04-23

## 2025-05-10 DIAGNOSIS — I10 ESSENTIAL HYPERTENSION: ICD-10-CM

## 2025-05-10 DIAGNOSIS — I48.0 PAF (PAROXYSMAL ATRIAL FIBRILLATION) (HCC): ICD-10-CM

## 2025-05-11 RX ORDER — METOPROLOL TARTRATE 50 MG
50 TABLET ORAL 2 TIMES DAILY
Qty: 180 TABLET | Refills: 1 | Status: SHIPPED | OUTPATIENT
Start: 2025-05-11

## 2025-05-11 RX ORDER — TELMISARTAN 20 MG/1
20 TABLET ORAL DAILY
Qty: 30 TABLET | Refills: 5 | Status: SHIPPED | OUTPATIENT
Start: 2025-05-11

## 2025-08-02 DIAGNOSIS — I48.91 ATRIAL FIBRILLATION WITH RAPID VENTRICULAR RESPONSE (HCC): ICD-10-CM

## 2025-08-04 RX ORDER — APIXABAN 5 MG/1
5 TABLET, FILM COATED ORAL 2 TIMES DAILY
Qty: 180 TABLET | Refills: 1 | Status: SHIPPED | OUTPATIENT
Start: 2025-08-04

## 2025-08-07 ENCOUNTER — TELEPHONE (OUTPATIENT)
Age: 75
End: 2025-08-07

## (undated) DEVICE — SEAL BIOPSY PORT ADJUST F/ACCESSORIES UP TO 3FR

## (undated) DEVICE — FIBER LASER HOLIUM 365 MICRON

## (undated) DEVICE — URETERAL CATH 5 FR

## (undated) DEVICE — GLOVE SRG BIOGEL 8

## (undated) DEVICE — Device

## (undated) DEVICE — LUBRICANT SURGILUBE TUBE 4 OZ  FLIP TOP

## (undated) DEVICE — GUIDEWIRE STRGHT TIP 0.038 IN SOLO PLUS

## (undated) DEVICE — CYSTOSCOPY PACK: Brand: CONVERTORS

## (undated) DEVICE — RADIOLOGY STERILE LABELS: Brand: CENTURION

## (undated) DEVICE — FABRIC REINFORCED, SURGICAL GOWN, XL: Brand: CONVERTORS

## (undated) DEVICE — NGAGE, NITINOL STONE EXTRACTOR MODIFIED BASKET: Brand: NGAGE

## (undated) DEVICE — PROVE COVER: Brand: UNBRANDED

## (undated) DEVICE — CYSTO TUBING TUR Y IRRIGATION

## (undated) DEVICE — SPONGE STICK WITH PVP-I: Brand: KENDALL

## (undated) DEVICE — POUCH UR CATCHER STERILE